# Patient Record
Sex: FEMALE | Race: WHITE | NOT HISPANIC OR LATINO | Employment: OTHER | URBAN - METROPOLITAN AREA
[De-identification: names, ages, dates, MRNs, and addresses within clinical notes are randomized per-mention and may not be internally consistent; named-entity substitution may affect disease eponyms.]

---

## 2017-06-12 ENCOUNTER — HOSPITAL ENCOUNTER (EMERGENCY)
Facility: HOSPITAL | Age: 47
Discharge: HOME | End: 2017-06-12
Attending: EMERGENCY MEDICINE
Payer: COMMERCIAL

## 2017-06-12 VITALS
HEIGHT: 64 IN | TEMPERATURE: 99 F | RESPIRATION RATE: 19 BRPM | HEART RATE: 70 BPM | WEIGHT: 153.63 LBS | DIASTOLIC BLOOD PRESSURE: 69 MMHG | SYSTOLIC BLOOD PRESSURE: 127 MMHG | BODY MASS INDEX: 26.23 KG/M2 | OXYGEN SATURATION: 98 %

## 2017-06-12 DIAGNOSIS — M25.569 KNEE PAIN: ICD-10-CM

## 2017-06-12 DIAGNOSIS — S80.02XA CONTUSION OF KNEE, LEFT: Primary | ICD-10-CM

## 2017-06-12 DIAGNOSIS — S90.02XA CONTUSION OF ANKLE, LEFT: ICD-10-CM

## 2017-06-12 DIAGNOSIS — M25.579 ANKLE PAIN: ICD-10-CM

## 2017-06-12 PROCEDURE — 99283 EMERGENCY DEPT VISIT LOW MDM: CPT

## 2017-06-12 RX ORDER — TRAMADOL HYDROCHLORIDE 50 MG/1
50 TABLET ORAL EVERY 6 HOURS PRN
Qty: 12 TABLET | Refills: 0 | Status: SHIPPED | OUTPATIENT
Start: 2017-06-12 | End: 2017-08-08
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
## 2017-08-08 ENCOUNTER — OFFICE VISIT (OUTPATIENT)
Dept: OBSTETRICS AND GYNECOLOGY | Facility: CLINIC | Age: 47
End: 2017-08-08
Payer: MEDICAID

## 2017-08-08 ENCOUNTER — LAB VISIT (OUTPATIENT)
Dept: LAB | Facility: HOSPITAL | Age: 47
End: 2017-08-08
Attending: ADVANCED PRACTICE MIDWIFE
Payer: MEDICAID

## 2017-08-08 VITALS
WEIGHT: 166.31 LBS | DIASTOLIC BLOOD PRESSURE: 70 MMHG | SYSTOLIC BLOOD PRESSURE: 120 MMHG | HEIGHT: 64 IN | BODY MASS INDEX: 28.39 KG/M2

## 2017-08-08 DIAGNOSIS — Z11.3 SCREENING FOR STDS (SEXUALLY TRANSMITTED DISEASES): ICD-10-CM

## 2017-08-08 DIAGNOSIS — Z12.39 BREAST CANCER SCREENING: ICD-10-CM

## 2017-08-08 DIAGNOSIS — Z01.419 WELL WOMAN EXAM WITH ROUTINE GYNECOLOGICAL EXAM: Primary | ICD-10-CM

## 2017-08-08 PROCEDURE — 86703 HIV-1/HIV-2 1 RESULT ANTBDY: CPT

## 2017-08-08 PROCEDURE — 86592 SYPHILIS TEST NON-TREP QUAL: CPT

## 2017-08-08 PROCEDURE — 80074 ACUTE HEPATITIS PANEL: CPT

## 2017-08-08 PROCEDURE — 99386 PREV VISIT NEW AGE 40-64: CPT | Mod: S$PBB,,, | Performed by: ADVANCED PRACTICE MIDWIFE

## 2017-08-08 PROCEDURE — 36415 COLL VENOUS BLD VENIPUNCTURE: CPT | Mod: PO

## 2017-08-08 PROCEDURE — 99999 PR PBB SHADOW E&M-EST. PATIENT-LVL III: CPT | Mod: PBBFAC,,, | Performed by: ADVANCED PRACTICE MIDWIFE

## 2017-08-08 RX ORDER — IBUPROFEN 200 MG
1 TABLET ORAL
COMMUNITY
End: 2018-02-07

## 2017-08-08 RX ORDER — DULOXETIN HYDROCHLORIDE 30 MG/1
30 CAPSULE, DELAYED RELEASE ORAL 2 TIMES DAILY
COMMUNITY
Start: 2016-11-20 | End: 2018-02-02

## 2017-08-08 RX ORDER — HYDROXYZINE HYDROCHLORIDE 25 MG/1
TABLET, FILM COATED ORAL
Status: ON HOLD | COMMUNITY
Start: 2017-03-01 | End: 2018-04-08 | Stop reason: HOSPADM

## 2017-08-08 RX ORDER — QUETIAPINE FUMARATE 100 MG/1
TABLET, FILM COATED ORAL
COMMUNITY
Start: 2017-07-17 | End: 2018-02-02 | Stop reason: ALTCHOICE

## 2017-08-08 RX ORDER — CARVEDILOL 12.5 MG/1
12.5 TABLET ORAL 2 TIMES DAILY
COMMUNITY

## 2017-08-08 RX ORDER — OMEPRAZOLE 40 MG/1
CAPSULE, DELAYED RELEASE ORAL
Status: ON HOLD | COMMUNITY
Start: 2017-03-01 | End: 2018-09-18

## 2017-08-08 RX ORDER — ALBUTEROL SULFATE 90 UG/1
2 AEROSOL, METERED RESPIRATORY (INHALATION)
COMMUNITY
Start: 2016-09-23 | End: 2017-09-23

## 2017-08-08 RX ORDER — ATORVASTATIN CALCIUM 40 MG/1
TABLET, FILM COATED ORAL
Refills: 10 | COMMUNITY
Start: 2017-07-15

## 2017-08-08 NOTE — PROGRESS NOTES
"CC: Well woman exam    Valorie Monzon is a 47 y.o. female  presents for a well woman exam.  LMP: No LMP recorded. Patient has had a hysterectomy..  No issues, problems, or complaints. Desires STD screening    Past Medical History:   Diagnosis Date    COPD (chronic obstructive pulmonary disease)     Hypertension     Kidney stone     Stroke     Tobacco use      Past Surgical History:   Procedure Laterality Date    HYSTERECTOMY      KNEE ARTHROSCOPY W/ ACL RECONSTRUCTION Left     LITHOTRIPSY       Social History     Social History    Marital status: Legally      Spouse name: N/A    Number of children: N/A    Years of education: N/A     Social History Main Topics    Smoking status: Current Every Day Smoker     Packs/day: 1.00     Types: Cigarettes    Smokeless tobacco: None    Alcohol use No    Drug use: No    Sexual activity: Yes     Partners: Male     Other Topics Concern    None     Social History Narrative    None     Family History   Problem Relation Age of Onset    Kidney disease Mother     Cancer Mother     Liver disease Mother      OB History      Para Term  AB Living    4 4 0 0 0 4    SAB TAB Ectopic Multiple Live Births    0 0 0 0 4          /70   Ht 5' 4" (1.626 m)   Wt 75.4 kg (166 lb 5.4 oz)   BMI 28.55 kg/m²       ROS:  GENERAL: Denies weight gain or weight loss. Feeling well overall.   SKIN: Denies rash or lesions.   HEAD: Denies head injury or headache.   NODES: Denies enlarged lymph nodes.   CHEST: Denies chest pain or shortness of breath.   CARDIOVASCULAR: Denies palpitations or left sided chest pain.   ABDOMEN: No abdominal pain, constipation, diarrhea, nausea, vomiting or rectal bleeding.   URINARY: No frequency, dysuria, hematuria, or burning on urination.  REPRODUCTIVE: See HPI.   BREASTS: The patient performs breast self-examination and denies pain, lumps, or nipple discharge.   HEMATOLOGIC: No easy bruisability or excessive " bleeding.   MUSCULOSKELETAL: Denies joint pain or swelling.   NEUROLOGIC: Denies syncope or weakness.   PSYCHIATRIC: Denies depression, anxiety or mood swings.    PHYSICAL EXAM:    APPEARANCE: Well nourished, well developed, in no acute distress.  AFFECT: WNL, alert and oriented x 3  SKIN: No acne or hirsutism  NECK: Neck symmetric without masses or thyromegaly  NODES: No inguinal, cervical, axillary, or femoral lymph node enlargement  CHEST: Good respiratory effect  ABDOMEN: Soft.  No tenderness or masses.  No hepatosplenomegaly.  No hernias.  BREASTS: Symmetrical, no skin changes or visible lesions.  No palpable masses, nipple discharge bilaterally.  PELVIC: Normal external genitalia without lesions.  Normal hair distribution.  Adequate perineal body, normal urethral meatus.  Vagina moist and well rugated without lesions or discharge.  Cervix pink, without lesions, discharge or tenderness.  No significant cystocele or rectocele.  Bimanual exam shows uterus absent. Adnexa without masses or tenderness.        1. Well woman exam with routine gynecological exam     2. Breast cancer screening  Mammo Digital Screening Bilat With CAD   3. Screening for STDs (sexually transmitted diseases)  C. trachomatis/N. gonorrhoeae by AMP DNA Vagina    HIV-1 and HIV-2 antibodies    RPR    Hepatitis panel, acute    Hepatitis B surface antigen   PLAN:  STD screening  Mammogram scheduled  Patient was counseled today on A.C.S. Pap guidelines and recommendations for yearly pelvic exams, mammograms and monthly self breast exams; to see her PCP for other health maintenance.

## 2017-08-09 LAB
C TRACH DNA SPEC QL NAA+PROBE: NOT DETECTED
HAV IGM SERPL QL IA: NEGATIVE
HBV CORE IGM SERPL QL IA: NEGATIVE
HBV SURFACE AG SERPL QL IA: NEGATIVE
HBV SURFACE AG SERPL QL IA: NEGATIVE
HCV AB SERPL QL IA: NEGATIVE
HIV 1+2 AB+HIV1 P24 AG SERPL QL IA: NEGATIVE
N GONORRHOEA DNA SPEC QL NAA+PROBE: NOT DETECTED
RPR SER QL: NORMAL

## 2017-08-13 ENCOUNTER — HOSPITAL ENCOUNTER (EMERGENCY)
Facility: HOSPITAL | Age: 47
Discharge: HOME | End: 2017-08-13
Payer: MEDICAID

## 2017-08-13 VITALS
OXYGEN SATURATION: 98 % | HEART RATE: 92 BPM | HEIGHT: 64 IN | SYSTOLIC BLOOD PRESSURE: 132 MMHG | BODY MASS INDEX: 27.31 KG/M2 | RESPIRATION RATE: 18 BRPM | WEIGHT: 160 LBS | DIASTOLIC BLOOD PRESSURE: 87 MMHG | TEMPERATURE: 98 F

## 2017-08-13 DIAGNOSIS — M54.12 CERVICAL RADICULOPATHY: Primary | ICD-10-CM

## 2017-08-13 PROCEDURE — 99283 EMERGENCY DEPT VISIT LOW MDM: CPT

## 2017-08-13 RX ORDER — CYCLOBENZAPRINE HCL 10 MG
10 TABLET ORAL 3 TIMES DAILY PRN
Qty: 15 TABLET | Refills: 0 | Status: SHIPPED | OUTPATIENT
Start: 2017-08-13 | End: 2017-08-18

## 2017-08-13 RX ORDER — DICLOFENAC SODIUM 75 MG/1
75 TABLET, DELAYED RELEASE ORAL 2 TIMES DAILY
Qty: 20 TABLET | Refills: 0 | Status: SHIPPED | OUTPATIENT
Start: 2017-08-13 | End: 2018-02-02 | Stop reason: ALTCHOICE

## 2017-08-13 RX ORDER — PREDNISONE 20 MG/1
40 TABLET ORAL DAILY
Qty: 10 TABLET | Refills: 0 | Status: SHIPPED | OUTPATIENT
Start: 2017-08-13 | End: 2017-08-18

## 2017-08-13 NOTE — ED PROVIDER NOTES
SCRIBE #1 NOTE: I, Joaquin Herndon, am scribing for, and in the presence of, YANET Betancourt. I have scribed the entire note.      History      Chief Complaint   Patient presents with    Shoulder Pain     R shoulder pain x 4 days ago, not relieved by ibuprofen, pt states she was moving a lot of boxes       Review of patient's allergies indicates:  No Known Allergies     HPI   HPI    8/13/2017, 3:21 PM   History obtained from the patient      History of Present Illness: Valorie Monzon is a 47 y.o. female patient who presents to the Emergency Department for R shoulder pain which onset gradually 4 days ago after moving a lot of boxes. Symptoms are constant and moderate in severity. Pt states that pain radiates down her R arm into her fingers. No mitigating or exacerbating factors reported. Associated sxs include R sided neck pain and numbness and paresthesia in the digits of the R hand. Patient denies any weakness, other injury, back pain, neck stiffness, fever, chills, n/v/d, CP, SOB, and all other sxs at this time. Prior Tx includes ibuprofen without relief. No further complaints or concerns at this time.       Arrival mode: Personal vehicle    PCP: Karri Levy MD       Past Medical History:  Past Medical History:   Diagnosis Date    COPD (chronic obstructive pulmonary disease)     Hypertension     Kidney stone     Stroke     Tobacco use        Past Surgical History:  Past Surgical History:   Procedure Laterality Date    HYSTERECTOMY      KNEE ARTHROSCOPY W/ ACL RECONSTRUCTION Left     LITHOTRIPSY           Family History:  Family History   Problem Relation Age of Onset    Kidney disease Mother     Cancer Mother     Liver disease Mother        Social History:  Social History     Social History Main Topics    Smoking status: Current Every Day Smoker     Packs/day: 1.00     Types: Cigarettes    Smokeless tobacco: Not given    Alcohol use No    Drug use: No    Sexual activity: Yes      Partners: Male       ROS   Review of Systems   Constitutional: Negative for chills and fever.        (-) other injury   HENT: Negative for sore throat.    Respiratory: Negative for shortness of breath.    Cardiovascular: Negative for chest pain.   Gastrointestinal: Negative for diarrhea, nausea and vomiting.   Genitourinary: Negative for dysuria.   Musculoskeletal: Positive for myalgias (R shoulder) and neck pain (R sided). Negative for back pain and neck stiffness.   Skin: Negative for rash.   Neurological: Positive for numbness (in digits of R hand). Negative for weakness.        (+) paresthesia in digits of R hand   Hematological: Does not bruise/bleed easily.   All other systems reviewed and are negative.      Physical Exam      Initial Vitals [08/13/17 1456]   BP Pulse Resp Temp SpO2   132/87 92 18 98 °F (36.7 °C) 98 %      MAP       102          Physical Exam  Nursing Notes and Vital Signs Reviewed.  Constitutional: Patient is in no acute distress. Well-developed and well-nourished.  Head: Atraumatic. Normocephalic.  Eyes: PERRL. EOM intact. Conjunctivae are not pale. No scleral icterus.  ENT: Mucous membranes are moist.    Neck: Supple. No lymphadenopathy. Cervical paraspinal tenderness.  Cardiovascular: Regular rate. Regular rhythm. No murmurs, rubs, or gallops.  Pulmonary/Chest: No respiratory distress.  Abdominal: Soft and non-distended.   Musculoskeletal: Moves all extremities. No obvious deformities. No edema.   RUE: has no evident deformity. Negative for swelling.. Cap refill distally is <2 seconds. Radial pulses are equal and 2+ bilaterally. No motor deficit. No distal sensory deficit.   Skin: Warm and dry.  Neurological:  Alert, awake, and appropriate.  Normal speech.  No acute focal neurological deficits are appreciated. Good  strength.  Psychiatric: Normal affect. Good eye contact. Appropriate in content.    ED Course    Procedures  ED Vital Signs:  Vitals:    08/13/17 1456   BP: 132/87  "  Pulse: 92   Resp: 18   Temp: 98 °F (36.7 °C)   TempSrc: Oral   SpO2: 98%   Weight: 72.6 kg (160 lb)   Height: 5' 4" (1.626 m)            The Emergency Provider reviewed the vital signs and test results, which are outlined above.    ED Discussion     3:34 PM: Initial assessment of pt at this time.  Discussed with pt all pertinent ED information. Discussed pt dx and plan of tx. Gave pt all f/u and return to the ED instructions. All questions and concerns were addressed at this time. Pt expresses understanding of information and instructions, and is comfortable with plan to discharge. Pt is stable for discharge.    I discussed with patient and/or family/caretaker that evaluation in the ED does not suggest any emergent or life threatening medical conditions requiring immediate intervention beyond what was provided in the ED, and I believe patient is safe for discharge.  Regardless, an unremarkable evaluation in the ED does not preclude the development or presence of a serious of life threatening condition. As such, patient was instructed to return immediately for any worsening or change in current symptoms.      ED Medication(s):  Medications - No data to display    New Prescriptions    CYCLOBENZAPRINE (FLEXERIL) 10 MG TABLET    Take 1 tablet (10 mg total) by mouth 3 (three) times daily as needed for Muscle spasms.    DICLOFENAC (VOLTAREN) 75 MG EC TABLET    Take 1 tablet (75 mg total) by mouth 2 (two) times daily.    PREDNISONE (DELTASONE) 20 MG TABLET    Take 2 tablets (40 mg total) by mouth once daily.       Follow-up Information     Karri Levy MD. Go in 2 days.    Specialty:  Internal Medicine  Contact information:  [address] [address] [id number] [phone]                     Medical Decision Making        Additional MDM:   Smoking Cessation: The patient was counseled on tobacco related  health complications.        Scribe Attestation:   Scribe #1: I performed the " above scribed service and the documentation accurately describes the services I performed. I attest to the accuracy of the note.    Attending:   Physician Attestation Statement for Scribe #1: I, YANET Betancourt, personally performed the services described in this documentation, as scribed by Joaquin Herndon, in my presence, and it is both accurate and complete.          Clinical Impression       ICD-10-CM ICD-9-CM   1. Cervical radiculopathy M54.12 723.4       Disposition:   Disposition: Discharged  Condition: Stable         YANET Betancourt  08/15/17 7625

## 2017-09-01 ENCOUNTER — HOSPITAL ENCOUNTER (OUTPATIENT)
Dept: RADIOLOGY | Facility: HOSPITAL | Age: 47
Discharge: HOME | End: 2017-09-01
Attending: ADVANCED PRACTICE MIDWIFE
Payer: MEDICAID

## 2017-09-01 VITALS — WEIGHT: 160 LBS | BODY MASS INDEX: 27.31 KG/M2 | HEIGHT: 64 IN

## 2017-09-01 DIAGNOSIS — Z12.39 BREAST CANCER SCREENING: ICD-10-CM

## 2017-09-01 PROCEDURE — 77067 SCR MAMMO BI INCL CAD: CPT | Mod: TC

## 2017-09-01 PROCEDURE — 77067 SCR MAMMO BI INCL CAD: CPT | Mod: 26,,, | Performed by: RADIOLOGY

## 2017-09-06 ENCOUNTER — HOSPITAL ENCOUNTER (OUTPATIENT)
Dept: RADIOLOGY | Facility: HOSPITAL | Age: 47
Discharge: HOME | End: 2017-09-06
Attending: ADVANCED PRACTICE MIDWIFE
Payer: MEDICAID

## 2017-09-06 ENCOUNTER — HOSPITAL ENCOUNTER (EMERGENCY)
Facility: HOSPITAL | Age: 47
Discharge: HOME | End: 2017-09-06
Attending: EMERGENCY MEDICINE
Payer: MEDICAID

## 2017-09-06 VITALS
BODY MASS INDEX: 28.17 KG/M2 | WEIGHT: 165 LBS | SYSTOLIC BLOOD PRESSURE: 113 MMHG | HEART RATE: 85 BPM | DIASTOLIC BLOOD PRESSURE: 77 MMHG | TEMPERATURE: 98 F | RESPIRATION RATE: 18 BRPM | HEIGHT: 64 IN

## 2017-09-06 DIAGNOSIS — S40.011A CONTUSION OF SHOULDER, RIGHT: Primary | ICD-10-CM

## 2017-09-06 DIAGNOSIS — M25.519 SHOULDER PAIN: ICD-10-CM

## 2017-09-06 DIAGNOSIS — R92.8 ABNORMAL MAMMOGRAM: ICD-10-CM

## 2017-09-06 DIAGNOSIS — S43.491A SPRAIN OF OTHER PART OF RIGHT SHOULDER REGION, INITIAL ENCOUNTER: ICD-10-CM

## 2017-09-06 PROCEDURE — 77065 DX MAMMO INCL CAD UNI: CPT | Mod: 26,LT,, | Performed by: RADIOLOGY

## 2017-09-06 PROCEDURE — 77065 DX MAMMO INCL CAD UNI: CPT | Mod: TC,LT

## 2017-09-06 PROCEDURE — 99283 EMERGENCY DEPT VISIT LOW MDM: CPT

## 2017-09-06 RX ORDER — ACETAMINOPHEN AND CODEINE PHOSPHATE 300; 30 MG/1; MG/1
1-2 TABLET ORAL EVERY 6 HOURS PRN
Qty: 14 TABLET | Refills: 0 | Status: SHIPPED | OUTPATIENT
Start: 2017-09-06 | End: 2018-02-02 | Stop reason: ALTCHOICE

## 2017-09-06 NOTE — ED PROVIDER NOTES
Encounter Date: 9/6/2017       History     Chief Complaint   Patient presents with    Shoulder Pain     shoulder pain and lower back pain     47 year old female with complaint of right shoulder pain and lower back pain after fall 3 days ago. Reports slipped while standing on chair and landed on right shoulder.  Moderate pain.  Worse with movement.  Constant aching pain.  No radiation of pain.            Review of patient's allergies indicates:  No Known Allergies  Past Medical History:   Diagnosis Date    COPD (chronic obstructive pulmonary disease)     Hypertension     Kidney stone     Stroke     Tobacco use      Past Surgical History:   Procedure Laterality Date    HYSTERECTOMY      KNEE ARTHROSCOPY W/ ACL RECONSTRUCTION Left     LITHOTRIPSY       Family History   Problem Relation Age of Onset    Kidney disease Mother     Cancer Mother     Liver disease Mother     Breast cancer Mother      Social History   Substance Use Topics    Smoking status: Current Every Day Smoker     Packs/day: 1.00     Types: Cigarettes    Smokeless tobacco: Not on file    Alcohol use No     Review of Systems   Constitutional: Negative for fever.   HENT: Negative for sore throat.    Respiratory: Negative for shortness of breath.    Cardiovascular: Negative for chest pain.   Gastrointestinal: Negative for nausea.   Genitourinary: Negative for dysuria.   Musculoskeletal: Positive for back pain.        Right shoulder pain    Skin: Negative for rash.   Neurological: Negative for weakness.   Hematological: Does not bruise/bleed easily.       Physical Exam     Initial Vitals [09/06/17 1124]   BP Pulse Resp Temp SpO2   113/77 85 18 97.8 °F (36.6 °C) --      MAP       89         Physical Exam    Nursing note and vitals reviewed.  Constitutional: She appears well-developed and well-nourished.   HENT:   Head: Normocephalic and atraumatic.   Eyes: Conjunctivae and EOM are normal. Pupils are equal, round, and reactive to light.   Neck:  Normal range of motion. Neck supple.   Cardiovascular: Normal rate, regular rhythm, normal heart sounds and intact distal pulses.   Pulmonary/Chest: Breath sounds normal.   Abdominal: Soft. There is no tenderness. There is no rebound and no guarding.   Musculoskeletal: Normal range of motion.   Right lateral shoulder tenderness, full ROM right shoulder without difficulty, no spine tenderness, ambulates without difficulty   Neurological: She is alert and oriented to person, place, and time. She has normal strength and normal reflexes.   Skin: Skin is warm and dry.   Psychiatric: She has a normal mood and affect. Her behavior is normal. Thought content normal.         ED Course   Procedures  Labs Reviewed - No data to display                            ED Course      Clinical Impression:   The primary encounter diagnosis was Contusion of shoulder, right. Diagnoses of Shoulder pain and Sprain of other part of right shoulder region, initial encounter were also pertinent to this visit.                           Tylor Meeks NP  09/06/17 2821

## 2017-09-07 DIAGNOSIS — R92.8 FOLLOW-UP EXAMINATION OF ABNORMAL MAMMOGRAM: Primary | ICD-10-CM

## 2018-01-30 ENCOUNTER — TELEPHONE (OUTPATIENT)
Dept: OBSTETRICS AND GYNECOLOGY | Facility: CLINIC | Age: 48
End: 2018-01-30

## 2018-01-30 NOTE — TELEPHONE ENCOUNTER
----- Message from Bella Alanis sent at 1/30/2018 12:31 PM CST -----  Contact: patient  Patient called and stated she has been having pain in her Pelvic area since July and she states it's getting very painful. She would like someone to call her at [phone].    Thanks,  Bella

## 2018-01-30 NOTE — TELEPHONE ENCOUNTER
Spoke with pt. Pt c/o of pelvic pain and states 3/1/18 is too long to wait. Scheduled pt for 2/2/18 at 9:30AM with Rosa Maria Cedeno N.P. At the [place] location. Pt verbalized understanding.

## 2018-02-02 ENCOUNTER — OFFICE VISIT (OUTPATIENT)
Dept: OBSTETRICS AND GYNECOLOGY | Facility: CLINIC | Age: 48
End: 2018-02-02
Payer: MEDICAID

## 2018-02-02 VITALS
SYSTOLIC BLOOD PRESSURE: 120 MMHG | HEIGHT: 64 IN | DIASTOLIC BLOOD PRESSURE: 72 MMHG | WEIGHT: 231.5 LBS | BODY MASS INDEX: 39.52 KG/M2

## 2018-02-02 DIAGNOSIS — R10.2 PELVIC PAIN IN FEMALE: Primary | ICD-10-CM

## 2018-02-02 PROCEDURE — 3008F BODY MASS INDEX DOCD: CPT | Mod: ,,, | Performed by: NURSE PRACTITIONER

## 2018-02-02 PROCEDURE — 99213 OFFICE O/P EST LOW 20 MIN: CPT | Mod: S$PBB,,, | Performed by: NURSE PRACTITIONER

## 2018-02-02 PROCEDURE — 99213 OFFICE O/P EST LOW 20 MIN: CPT | Mod: PBBFAC | Performed by: NURSE PRACTITIONER

## 2018-02-02 PROCEDURE — 99999 PR PBB SHADOW E&M-EST. PATIENT-LVL III: CPT | Mod: PBBFAC,,, | Performed by: NURSE PRACTITIONER

## 2018-02-02 RX ORDER — LORAZEPAM 1 MG/1
0.5 TABLET ORAL
Status: ON HOLD | COMMUNITY
Start: 2018-01-30 | End: 2018-04-08 | Stop reason: HOSPADM

## 2018-02-02 RX ORDER — NICOTINE 21-14-7MG
KIT TRANSDERMAL
COMMUNITY
Start: 2017-12-28

## 2018-02-02 RX ORDER — GABAPENTIN 600 MG/1
600 TABLET ORAL 2 TIMES DAILY
COMMUNITY
Start: 2018-01-30

## 2018-02-02 RX ORDER — ALBUTEROL SULFATE 90 UG/1
1 AEROSOL, METERED RESPIRATORY (INHALATION) 2 TIMES DAILY PRN
COMMUNITY
Start: 2017-11-08 | End: 2018-05-08

## 2018-02-02 RX ORDER — BACLOFEN 10 MG/1
10 TABLET ORAL
Status: ON HOLD | COMMUNITY
Start: 2018-01-30 | End: 2018-04-08 | Stop reason: HOSPADM

## 2018-02-02 RX ORDER — DULOXETIN HYDROCHLORIDE 60 MG/1
60 CAPSULE, DELAYED RELEASE ORAL DAILY
COMMUNITY
Start: 2018-01-16
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
## 2018-02-07 ENCOUNTER — HOSPITAL ENCOUNTER (EMERGENCY)
Facility: HOSPITAL | Age: 48
Discharge: HOME | End: 2018-02-07
Attending: EMERGENCY MEDICINE
Payer: MEDICAID

## 2018-02-07 ENCOUNTER — TELEPHONE (OUTPATIENT)
Dept: RADIOLOGY | Facility: HOSPITAL | Age: 48
End: 2018-02-07

## 2018-02-07 VITALS
HEIGHT: 64 IN | HEART RATE: 82 BPM | RESPIRATION RATE: 25 BRPM | WEIGHT: 234.56 LBS | DIASTOLIC BLOOD PRESSURE: 66 MMHG | BODY MASS INDEX: 40.04 KG/M2 | TEMPERATURE: 98 F | SYSTOLIC BLOOD PRESSURE: 122 MMHG | OXYGEN SATURATION: 100 %

## 2018-02-07 DIAGNOSIS — R60.0 BILATERAL LEG EDEMA: Primary | ICD-10-CM

## 2018-02-07 PROCEDURE — 99283 EMERGENCY DEPT VISIT LOW MDM: CPT

## 2018-02-07 RX ORDER — FUROSEMIDE 20 MG/1
20 TABLET ORAL DAILY
Qty: 30 TABLET | Refills: 0 | Status: SHIPPED | OUTPATIENT
Start: 2018-02-07 | End: 2019-06-13

## 2018-02-07 NOTE — ED PROVIDER NOTES
"SCRIBE #1 NOTE: I, Ze Riley Marcin, am scribing for, and in the presence of, Xi Galss MD. I have scribed the entire note.      History      Chief Complaint   Patient presents with    Leg Swelling     " bilaterl leg swelling and tingling"       Review of patient's allergies indicates:  No Known Allergies     HPI   HPI    2/7/2018, 11:28 AM   History obtained from the patient      History of Present Illness: Valorie Monzon is a 47 y.o. female patient with a PMHx of DM, who presents to the Emergency Department for BLE swelling which onset gradually one week ago, denies any trauma or injury, numbness or weakness, no pain with ambulation. Sxs are constant and moderate in severity. There are no mitigating or exacerbating factors noted.  Pt denies any fever, N/V/D, calf pain, SOB, cough, CP, numbness, dizziness, and all other sxs at this time. Pt states she is no longer a smoker. No further complaints or concerns at this time.         Arrival mode: Personal vehicle     PCP: Karri Levy MD       Past Medical History:  Past Medical History:   Diagnosis Date    COPD (chronic obstructive pulmonary disease)     Hypertension     Kidney stone     Stroke     Tobacco use        Past Surgical History:  Past Surgical History:   Procedure Laterality Date    HYSTERECTOMY      KNEE ARTHROSCOPY W/ ACL RECONSTRUCTION Left     LITHOTRIPSY           Family History:  Family History   Problem Relation Age of Onset    Kidney disease Mother     Cancer Mother     Liver disease Mother     Breast cancer Mother        Social History:  Social History     Social History Main Topics    Smoking status: Former Smoker     Packs/day: 1.00     Types: Cigarettes    Smokeless tobacco: Never Used      Comment: patches     Alcohol use No    Drug use: No    Sexual activity: Not Currently     Partners: Male       ROS   Review of Systems   Constitutional: Negative for fever.   HENT: Negative for sore throat.  "   Respiratory: Negative for shortness of breath.    Cardiovascular: Positive for leg swelling. Negative for chest pain.   Gastrointestinal: Negative for abdominal pain, constipation, diarrhea, nausea and vomiting.   Genitourinary: Negative for dysuria and hematuria.   Musculoskeletal: Negative for back pain.   Skin: Negative for rash.   Neurological: Negative for weakness.   Hematological: Does not bruise/bleed easily.       Physical Exam      Initial Vitals [02/07/18 1043]   BP Pulse Resp Temp SpO2   129/85 82 20 97.9 °F (36.6 °C) 98 %      MAP       99.67          Physical Exam  Nursing Notes and Vital Signs Reviewed.  Constitutional: Patient is in no acute distress. Well-developed and well-nourished.  Head: Atraumatic. Normocephalic.  Eyes: PERRL. EOM intact. Conjunctivae are not pale. No scleral icterus.  ENT: Mucous membranes are moist. Oropharynx is clear and symmetric.    Neck: Supple. Full ROM. No lymphadenopathy.  Cardiovascular: Regular rate. Regular rhythm. No murmurs, rubs, or gallops. Distal pulses are 2+ and symmetric.  Pulmonary/Chest: No respiratory distress. Clear to auscultation bilaterally. No wheezing or rales.  Abdominal: Soft and non-distended.  There is no tenderness.  No rebound, guarding, or rigidity. Good bowel sounds.  Genitourinary: No CVA tenderness  Musculoskeletal: Moves all extremities. No obvious deformities. 1+ BLE edema. No calf tenderness. There is no erythema or warmth, no calf pain or swelling. NVI distally.   Skin: Warm and dry.  No rash.   Neurological:  Alert, awake, and appropriate.  Normal speech.  No acute focal neurological deficits are appreciated.  Psychiatric: Normal affect. Good eye contact. Appropriate in content.    ED Course    Procedures  ED Vital Signs:  Vitals:    02/07/18 1043 02/07/18 1101 02/07/18 1119   BP: 129/85 122/66    Pulse: 82 85 82   Resp: 20 (!) 25    Temp: 97.9 °F (36.6 °C)     TempSrc: Oral     SpO2: 98% 100%    Weight: 106.4 kg (234 lb 9.1 oz)    "  Height: 5' 4" (1.626 m)                The Emergency Provider reviewed the vital signs and test results, which are outlined above.    ED Discussion     11:29 AM: Discussed plan of treatment with pt, will start lasix, patient drinking a dr pepper discussed hidden sodium in processed foods, leg elevation, compression stockings, I do not think this is a DVT. Gave pt all f/u and return to the ED instructions. All questions and concerns were addressed at this time. Pt understands and agrees to plan as discussed. Pt is stable for discharge.     ED Medication(s):  Medications - No data to display    Discharge Medication List as of 2/7/2018 11:28 AM      START taking these medications    Details   furosemide (LASIX) 20 MG tablet Take 1 tablet (20 mg total) by mouth once daily., Starting Wed 2/7/2018, Until Thu 2/7/2019, Print             Follow-up Information     Karri Levy MD. Schedule an appointment as soon as possible for a visit in 1 day.    Specialty:  Internal Medicine  Why:  Return to the Emergency Room, If symptoms worsen  Contact information:  [address] [address] [address]  [phone]                     Medical Decision Making              Scribe Attestation:   Scribe #1: I performed the above scribed service and the documentation accurately describes the services I performed. I attest to the accuracy of the note.    Attending:   Physician Attestation Statement for Scribe #1: I, Xi Glass MD, personally performed the services described in this documentation, as scribed by Ze Burch, in my presence, and it is both accurate and complete.          Clinical Impression       ICD-10-CM ICD-9-CM   1. Bilateral leg edema R60.0 782.3       Disposition:   Disposition: Discharged  Condition: Stable         Xi Glass MD  02/07/18 1558    "

## 2018-02-08 ENCOUNTER — APPOINTMENT (OUTPATIENT)
Dept: RADIOLOGY | Facility: HOSPITAL | Age: 48
End: 2018-02-08
Attending: NURSE PRACTITIONER
Payer: MEDICAID

## 2018-02-08 ENCOUNTER — TELEPHONE (OUTPATIENT)
Dept: OBSTETRICS AND GYNECOLOGY | Facility: CLINIC | Age: 48
End: 2018-02-08

## 2018-02-08 DIAGNOSIS — R10.2 PELVIC PAIN IN FEMALE: ICD-10-CM

## 2018-02-08 DIAGNOSIS — N83.202 LEFT OVARIAN CYST: Primary | ICD-10-CM

## 2018-02-08 DIAGNOSIS — R93.429 ABNORMAL ULTRASOUND OF KIDNEY: ICD-10-CM

## 2018-02-08 PROCEDURE — 76856 US EXAM PELVIC COMPLETE: CPT | Mod: 26,,, | Performed by: RADIOLOGY

## 2018-02-08 PROCEDURE — 76830 TRANSVAGINAL US NON-OB: CPT | Mod: TC,PO

## 2018-02-08 PROCEDURE — 76830 TRANSVAGINAL US NON-OB: CPT | Mod: 26,,, | Performed by: RADIOLOGY

## 2018-02-08 NOTE — TELEPHONE ENCOUNTER
Spoke with pt and informed of pelvic u/s results. Scheduled pt at the [facility] location in 6 wks for a repeat u/s. Pt voiced understanding.

## 2018-02-08 NOTE — TELEPHONE ENCOUNTER
----- Message from Rosa Maria Cedeno NP sent at 2/8/2018  3:45 PM CST -----  Please call patient and inform  Her of pelvic ultrasound Small complex left ovarian cyst( nothing to worry about) Repeat imaging in 6-12 weeks to document resolution. Please set up repeat ultrasound.

## 2018-02-08 NOTE — PROGRESS NOTES
Please call patient and inform  Her of pelvic ultrasound Small complex left ovarian cyst( nothing to worry about) Repeat imaging in 6-12 weeks to document resolution. Please set up repeat ultrasound.

## 2018-02-09 ENCOUNTER — TELEPHONE (OUTPATIENT)
Dept: OBSTETRICS AND GYNECOLOGY | Facility: CLINIC | Age: 48
End: 2018-02-09

## 2018-02-12 ENCOUNTER — TELEPHONE (OUTPATIENT)
Dept: UROLOGY | Facility: CLINIC | Age: 48
End: 2018-02-12

## 2018-02-12 NOTE — TELEPHONE ENCOUNTER
Returned call to pt; attempted to schedule appt for her but MD is booked.  Informed pt that I would put her on a wait list and call if something opened up.

## 2018-02-15 ENCOUNTER — TELEPHONE (OUTPATIENT)
Dept: OBSTETRICS AND GYNECOLOGY | Facility: CLINIC | Age: 48
End: 2018-02-15

## 2018-02-15 NOTE — TELEPHONE ENCOUNTER
----- Message from Rosa Maria Cedeno NP sent at 2/9/2018  9:24 AM CST -----  Please call patient and inform  her that ultrasound indicates a possible left renal mass.Patient needs a referral appt to urology. I did attempt to discuss this with the patient, no answer.

## 2018-02-15 NOTE — TELEPHONE ENCOUNTER
Rachel LINARES from urology has reached out to the pt and placed on wait list per telephone encounter.

## 2018-02-26 ENCOUNTER — TELEPHONE (OUTPATIENT)
Dept: OBSTETRICS AND GYNECOLOGY | Facility: CLINIC | Age: 48
End: 2018-02-26

## 2018-02-26 NOTE — TELEPHONE ENCOUNTER
----- Message from Sri Pierre sent at 2/26/2018  2:59 PM CST -----  Contact: pt  The pt request a call, no additional info given, the pt can be reached at [phone]///thxMW

## 2018-02-26 NOTE — TELEPHONE ENCOUNTER
Pt was informed to contact the clinic today to try and get a sooner appt for Urology, due to a renal mass. Informed the patient that per notes on chart she was told that she would be placed on a wait list, but we can refer her to [facility], and they will give her a call to schedule an appt. Informed pt that I could not give any detailed information on her results, and this is the reason she is being referred. Pt voiced understanding.

## 2018-03-05 ENCOUNTER — HOSPITAL ENCOUNTER (OUTPATIENT)
Dept: RADIOLOGY | Facility: HOSPITAL | Age: 48
Discharge: HOME | End: 2018-03-05
Attending: ADVANCED PRACTICE MIDWIFE
Payer: MEDICAID

## 2018-03-05 DIAGNOSIS — R92.8 FOLLOW-UP EXAMINATION OF ABNORMAL MAMMOGRAM: ICD-10-CM

## 2018-03-05 PROCEDURE — 77065 DX MAMMO INCL CAD UNI: CPT | Mod: TC,LT

## 2018-03-05 PROCEDURE — 77065 DX MAMMO INCL CAD UNI: CPT | Mod: 26,LT,, | Performed by: RADIOLOGY

## 2018-03-06 ENCOUNTER — TELEPHONE (OUTPATIENT)
Dept: HEMATOLOGY/ONCOLOGY | Facility: CLINIC | Age: 48
End: 2018-03-06

## 2018-03-06 DIAGNOSIS — R92.8 ABNORMAL MAMMOGRAM OF LEFT BREAST: Primary | ICD-10-CM

## 2018-03-06 NOTE — TELEPHONE ENCOUNTER
----- Message from Eb Patterson III, LPN sent at 3/6/2018 10:29 AM CST -----  Contact: Jackeline       ----- Message -----  From: Jackeline Knight MA  Sent: 3/6/2018   9:38 AM  To: [facility] Staff    Good Morning,     I was attempting to try and get this patient scheduled with your office, due to her having an abnormal mammo, and possibly needing a biopsy. Can you please assist me with this?

## 2018-03-09 ENCOUNTER — TELEPHONE (OUTPATIENT)
Dept: OBSTETRICS AND GYNECOLOGY | Facility: CLINIC | Age: 48
End: 2018-03-09

## 2018-03-09 ENCOUNTER — TELEPHONE (OUTPATIENT)
Dept: HEMATOLOGY/ONCOLOGY | Facility: CLINIC | Age: 48
End: 2018-03-09

## 2018-03-09 NOTE — TELEPHONE ENCOUNTER
----- Message from Rosa Maria Cedeno NP sent at 3/6/2018  8:57 AM CST -----  Patient needs referral for [facility] general surgery.,:Left breast calcifications at the retroareolar anterior 1 o'clock position. Assessment: 4 - Suspicious finding. Biopsy is recommended.

## 2018-03-12 ENCOUNTER — TELEPHONE (OUTPATIENT)
Dept: OBSTETRICS AND GYNECOLOGY | Facility: CLINIC | Age: 48
End: 2018-03-12

## 2018-03-12 ENCOUNTER — TELEPHONE (OUTPATIENT)
Dept: SURGERY | Facility: CLINIC | Age: 48
End: 2018-03-12

## 2018-03-12 NOTE — TELEPHONE ENCOUNTER
----- Message from Miguelina Barajas sent at 3/12/2018 10:49 AM CDT -----  Pt at [phone]//states she has questions regarding her appt on 3-22-18//please call//thanks/amrita

## 2018-03-12 NOTE — TELEPHONE ENCOUNTER
----- Message from Merissa Pierre sent at 3/12/2018 10:08 AM CDT -----  Patient state she received a notice in the mail stating she need to have a biopsy done. Patient wants to know if this will be performed at her appt with the Hematologist Oncologist. Please adv/call [phone].//thanks. cw

## 2018-03-20 NOTE — PROGRESS NOTES
Patient ID: Valorie Monzon is a 47 y.o. female.    Chief Complaint: Abnormal Mammogram      HPI: Patient presents for the evaluation of an abnormal mammogram of the left breast.  September 1, 2017 patient had a bilateral mammogram that was a screening that revealed calcifications in the left breast 1:00 position.  Six-month follow-up was recommended and performed on March 5, 2018.  Left breast diagnostic mammogram revealed more ill-defined appearance of the calcifications therefore stereotactic biopsy has been recommended.    Patient presents today to discuss biopsy options and recommendations.    Patient is on daily aspirin and the last dose was today.     Personal history of CVA 2 years ago. Followed by her cardiologist Dr. Mcdonald. Spoke to him today and he states is ok for her to be off her aspirin for 5 days prior to the biopsy. Risks of CVA reviewed while off aspirin. Pt verbalized understanding.     Review of Systems   Constitutional: Negative.  Negative for appetite change and unexpected weight change.   HENT: Negative.    Eyes: Negative.  Negative for visual disturbance.   Respiratory: Negative.  Negative for cough and shortness of breath.    Cardiovascular: Negative.  Negative for chest pain.   Gastrointestinal: Negative.  Negative for abdominal pain and diarrhea.        No reflux   Endocrine: Negative.    Genitourinary: Negative.  Negative for frequency.   Musculoskeletal: Negative.  Negative for back pain.   Skin: Negative.  Negative for rash.   Allergic/Immunologic: Negative.    Neurological: Negative.  Negative for headaches.   Hematological: Negative.  Negative for adenopathy.   Psychiatric/Behavioral: Negative.  The patient is not nervous/anxious.    Breast: Pt denies any breast pain, nipple discharge, or palpable mass. No prior trauma or bruising. No breast surgeries or abnormalities.    Current Outpatient Prescriptions   Medication Sig Dispense Refill    aspirin (ECOTRIN) 325 MG EC tablet  Take 325 mg by mouth once daily.      atorvastatin (LIPITOR) 40 MG tablet TAKE 1 TABLET BY MOUTH DAILY AVOID GRAPEFRUIT  10    baclofen (LIORESAL) 10 MG tablet Take 10 mg by mouth as needed.      carvedilol (COREG) 3.125 MG tablet Take 3.125 mg by mouth.      DULoxetine (CYMBALTA) 60 MG capsule Take 60 mg by mouth once daily.      furosemide (LASIX) 20 MG tablet Take 1 tablet (20 mg total) by mouth once daily. 30 tablet 0    gabapentin (NEURONTIN) 600 MG tablet Take 600 mg by mouth 2 (two) times daily.      hydrOXYzine HCl (ATARAX) 25 MG tablet TAKE 1 TABLET BY MOUTH THREE TIMES DAILY AS NEEDED FOR ANXIETY      LORazepam (ATIVAN) 1 MG tablet Take 0.5 mg by mouth.      nicotine (NICODERM CQ) 7 mg/24 hr       omeprazole (PRILOSEC) 20 MG capsule TAKE 1 CAPSULE BY MOUTH TWICE DAILY      VENTOLIN HFA 90 mcg/actuation inhaler        No current facility-administered medications for this visit.        Review of patient's allergies indicates:  No Known Allergies    Past Medical History:   Diagnosis Date    COPD (chronic obstructive pulmonary disease)     Hypertension     Kidney stone     Stroke     Tobacco use        Past Surgical History:   Procedure Laterality Date    HYSTERECTOMY      KNEE ARTHROSCOPY W/ ACL RECONSTRUCTION Left     LITHOTRIPSY         Family History   Problem Relation Age of Onset    Kidney disease Mother     Cancer Mother     Liver disease Mother     Breast cancer Mother        Social History     Social History    Marital status: Legally      Spouse name: N/A    Number of children: N/A    Years of education: N/A     Occupational History    Not on file.     Social History Main Topics    Smoking status: Former Smoker     Packs/day: 1.00     Types: Cigarettes    Smokeless tobacco: Never Used      Comment: patches     Alcohol use No    Drug use: No    Sexual activity: Not Currently     Partners: Male     Other Topics Concern    Not on file     Social History  Narrative    No narrative on file       Vitals:    03/22/18 0843   BP: 115/84   Pulse: 89   Temp: 98.4 °F (36.9 °C)       Physical Exam   Constitutional: She is oriented to person, place, and time. She appears well-developed and well-nourished.   HENT:   Head: Normocephalic and atraumatic.   Right Ear: External ear normal.   Left Ear: External ear normal.   Mouth/Throat: No oropharyngeal exudate.   Eyes: Conjunctivae and EOM are normal. Pupils are equal, round, and reactive to light. Right eye exhibits no discharge. Left eye exhibits no discharge. No scleral icterus.   Neck: Normal range of motion. Neck supple. No thyromegaly present.   Cardiovascular: Normal rate, regular rhythm and normal heart sounds.    Pulmonary/Chest: Effort normal and breath sounds normal. Right breast exhibits no inverted nipple, no mass, no nipple discharge, no skin change and no tenderness. Left breast exhibits no inverted nipple, no mass, no nipple discharge, no skin change and no tenderness.   Abdominal: Soft. Bowel sounds are normal.   Musculoskeletal: Normal range of motion. She exhibits no edema.        Right shoulder: She exhibits no crepitus and normal strength.   Lymphadenopathy:        Head (right side): No submental, no submandibular, no tonsillar, no preauricular, no posterior auricular and no occipital adenopathy present.        Head (left side): No submental, no submandibular, no tonsillar, no preauricular, no posterior auricular and no occipital adenopathy present.     She has no cervical adenopathy.        Right cervical: No superficial cervical and no posterior cervical adenopathy present.       Left cervical: No superficial cervical and no posterior cervical adenopathy present.     She has no axillary adenopathy.        Right: No supraclavicular adenopathy present.        Left: No supraclavicular adenopathy present.   Neurological: She is alert and oriented to person, place, and time. She has normal reflexes.   Skin: Skin  is warm and dry. No rash noted. No erythema. No pallor.   Psychiatric: She has a normal mood and affect. Her behavior is normal. Judgment and thought content normal.       Imagin17:  Result:   Mammo Digital Screening Bilat With CAD     History:  Patient is 47 y.o. and is seen for breast cancer screening.           Films Compared:  Baseline exam     Findings:   Computer-aided detection was utilized in the interpretation of this examination.  The breasts have scattered areas of fibroglandular density. Incidental note of bilateral axillary nodes.     Left  There are calcifications in a grouped distribution seen in the retroareolar region of the left breast at 1 o'clock in the anterior depth on the MLO and CC views.      Right  There is no evidence of suspicious masses, calcifications, or other abnormal findings.     Impression:  Left  Calcifications: Left breast calcifications at the retroareolar anterior 1 o'clock position. Assessment: 0 - Incomplete. Special Views: Mag is recommended.      Right  There is no mammographic evidence of malignancy.     BI-RADS Category:   Overall: 0 - Incomplete: Needs Additional Imaging Evaluation     Recommendation:  Special Views: Mag is recommended for the left breast.     The patient's estimated lifetime risk of breast cancer (to age 85) based on Tyrer-Cuzick - 7 risk assessment model is: Tyrer-Cuzick: 18.83 %. According to the American Cancer Society,  patients with a lifetime breast cancer risk of 20% or higher might benefit from supplemental screening tests.           Result:   Mammo Digital Diagnostic Left with CAD     History:  Patient is 47 y.o. and is seen for abnormal mammogram.           Films Compared:  Compared to: 2017 Mammo Digital Screening Bilat With CAD     Findings:   Computer-aided detection was utilized in the interpretation of this examination.  The breast has scattered areas of fibroglandular density.     There are round calcifications in a grouped  distribution seen in the retroareolar region of the left breast at 1 o'clock in the anterior depth on the MLO and CC views. Similar findings noted involving the right breast.      Impression:  Left  Calcifications: Left breast calcifications at the retroareolar anterior 1 o'clock position. Assessment: 3 - Probably benign. Short Interval Follow-Up in 6 Months is recommended.      BI-RADS Category:   Left: 3 - Probably Benign  Overall: 3 - Probably Benign     Recommendation:  Short Interval Follow-Up in 6 Months is recommended for the left breast.     The patient's estimated lifetime risk of breast cancer (to age 85) based on Tyrer-Cuzick - 7 risk assessment model is: Tyrer-Cuzick: 18.83 %. According to the American Cancer Society,  patients with a lifetime breast cancer risk of 20% or higher might benefit from supplemental screening tests.    3/5/18:  Result:   Mammo Digital Diagnostic Left with CAD     History:  Patient is 47 y.o. and is seen for a diagnostic mammogram.     Films Compared:  09/06/2017 Mammo Digital Diagnostic Left with CAD, and 09/01/2017 Mammo Digital Screening Bilat With CAD     Findings:   Computer-aided detection was utilized in the interpretation of this examination.  The breast has scattered areas of fibroglandular density.     There are amorphous calcifications in a grouped distribution seen in the retroareolar region of the left breast at 1 o'clock in the anterior depth on the MLO and CC views. Calcifications have a somewhat more ill-defined amorphous appearance as compared to prior.      Impression:  Left  Calcifications: Left breast calcifications at the retroareolar anterior 1 o'clock position. Assessment: 4 - Suspicious finding. Biopsy is recommended.      BI-RADS Category:   Left: 4 - Suspicious  Overall: 4 - Suspicious     Recommendation:  Biopsy is recommended.     The patient's estimated lifetime risk of breast cancer (to age 85) based on Tyrer-Cuzick - 7 risk assessment model is:  Henrique: 18.83 %. According to the American Cancer Society,  patients with a lifetime breast cancer risk of 20% or higher might benefit from supplemental screening tests.      Risk factors identified:     Menarche at 14 y/o  G 4 P 4  Fist pregnancy at 19 y/o  LMP: Partial hysterectomy at 44 years of age  Estrogen: none  Radiation to the neck or chest wall- none    Prior breast biopsies or atypical hyperplasia- none     FH: Mother breast cancer at unknown age    Assessment & Plan:  Abnormal mammogram  -     Mammo Breast Stereotactic Biopsy Left; Future; Expected date: 03/29/2018    Breast calcifications on mammogram  -     Mammo Breast Stereotactic Biopsy Left; Future; Expected date: 03/29/2018    Counseling regarding goals of care       1.  Explained imaging results which reveal calcifications in the left breast at the 1:00 position.  2.  Negative clinical exam  3.  Recommend stereotactic core needle biopsy of the left breast calcifications.  Risk versus benefits of the procedure were explained to the patient in detail.  Risk of bleeding, bruising, pain, discomfort, infection, and possibly missing the lesion were discussed.  If the pathology returns within normal limits recommendation for six-month follow-up imaging and clinical exam will be made.  If pathology returned suspicious or cancer further surgical intervention may be recommended and or treatment through oncology.  Patient verbalized understanding.  4.  Preoperative, perioperative, and postoperative instructions were given to the patient verbally and in writing.  The patient has been scheduled at [facility] similar location for March 28.  She will see me one week later for test results on April 5.  If we receive her test results prior to her appointment we will give her a call.  Patient's phone number is [phone], daughter-in-law is Annie Chan- [phone]- a shunt has okayed is contacting her if we cannot reach the patient.  Information regarding  treatment for breast cancer was given to the patient in the state required pamphlet form.  This was reviewed with the patient in detail.

## 2018-03-21 ENCOUNTER — TELEPHONE (OUTPATIENT)
Dept: RADIOLOGY | Facility: HOSPITAL | Age: 48
End: 2018-03-21

## 2018-03-22 ENCOUNTER — OFFICE VISIT (OUTPATIENT)
Dept: HEMATOLOGY/ONCOLOGY | Facility: CLINIC | Age: 48
End: 2018-03-22
Payer: MEDICAID

## 2018-03-22 ENCOUNTER — APPOINTMENT (OUTPATIENT)
Dept: RADIOLOGY | Facility: HOSPITAL | Age: 48
End: 2018-03-22
Attending: NURSE PRACTITIONER
Payer: MEDICAID

## 2018-03-22 VITALS
TEMPERATURE: 98 F | OXYGEN SATURATION: 99 % | SYSTOLIC BLOOD PRESSURE: 115 MMHG | HEART RATE: 89 BPM | DIASTOLIC BLOOD PRESSURE: 84 MMHG | WEIGHT: 229.25 LBS | BODY MASS INDEX: 39.14 KG/M2 | HEIGHT: 64 IN

## 2018-03-22 DIAGNOSIS — R92.8 ABNORMAL MAMMOGRAM: Primary | ICD-10-CM

## 2018-03-22 DIAGNOSIS — Z86.73 HISTORY OF CVA (CEREBROVASCULAR ACCIDENT): ICD-10-CM

## 2018-03-22 DIAGNOSIS — N83.202 LEFT OVARIAN CYST: ICD-10-CM

## 2018-03-22 DIAGNOSIS — R92.1 BREAST CALCIFICATIONS ON MAMMOGRAM: ICD-10-CM

## 2018-03-22 DIAGNOSIS — Z71.89 COUNSELING REGARDING GOALS OF CARE: ICD-10-CM

## 2018-03-22 PROCEDURE — 99213 OFFICE O/P EST LOW 20 MIN: CPT | Mod: PBBFAC,25 | Performed by: NURSE PRACTITIONER

## 2018-03-22 PROCEDURE — 76856 US EXAM PELVIC COMPLETE: CPT | Mod: 26,,, | Performed by: RADIOLOGY

## 2018-03-22 PROCEDURE — 99204 OFFICE O/P NEW MOD 45 MIN: CPT | Mod: S$PBB,,, | Performed by: NURSE PRACTITIONER

## 2018-03-22 PROCEDURE — 99999 PR PBB SHADOW E&M-EST. PATIENT-LVL III: CPT | Mod: PBBFAC,,, | Performed by: NURSE PRACTITIONER

## 2018-03-22 PROCEDURE — 76830 TRANSVAGINAL US NON-OB: CPT | Mod: 26,,, | Performed by: RADIOLOGY

## 2018-03-22 PROCEDURE — 76830 TRANSVAGINAL US NON-OB: CPT | Mod: TC,PO

## 2018-03-23 ENCOUNTER — TELEPHONE (OUTPATIENT)
Dept: OBSTETRICS AND GYNECOLOGY | Facility: CLINIC | Age: 48
End: 2018-03-23

## 2018-03-23 NOTE — TELEPHONE ENCOUNTER
----- Message from Rosa Maria Cedeno NP sent at 3/22/2018  3:48 PM CDT -----  Have patient see Castrejon regarding pain and ovarian cysts.

## 2018-03-23 NOTE — TELEPHONE ENCOUNTER
Spoke with pt  Notified of pelvic u/s results given. Scheduled pt with [facility]. Patient verbalized understanding

## 2018-03-29 ENCOUNTER — TELEPHONE (OUTPATIENT)
Dept: RADIOLOGY | Facility: HOSPITAL | Age: 48
End: 2018-03-29

## 2018-03-29 ENCOUNTER — HOSPITAL ENCOUNTER (OUTPATIENT)
Dept: RADIOLOGY | Facility: HOSPITAL | Age: 48
Discharge: HOME | End: 2018-03-29
Attending: INTERNAL MEDICINE
Payer: MEDICAID

## 2018-03-29 DIAGNOSIS — R92.1 BREAST CALCIFICATIONS ON MAMMOGRAM: ICD-10-CM

## 2018-03-29 DIAGNOSIS — R92.8 ABNORMAL MAMMOGRAM: ICD-10-CM

## 2018-03-29 PROCEDURE — 19081 BX BREAST 1ST LESION STRTCTC: CPT | Mod: LT,,, | Performed by: RADIOLOGY

## 2018-03-29 PROCEDURE — 88305 TISSUE EXAM BY PATHOLOGIST: CPT | Performed by: PATHOLOGY

## 2018-03-29 PROCEDURE — 19081 BX BREAST 1ST LESION STRTCTC: CPT | Mod: TC,PO,LT

## 2018-03-29 PROCEDURE — 88305 TISSUE EXAM BY PATHOLOGIST: CPT | Mod: 26,,, | Performed by: PATHOLOGY

## 2018-03-29 NOTE — NURSING
Pressure held on left breast biopsy site for 10 mins, hemostasis was achieved, steri strips were applied, and wound was covered with 4x4 gauze and a tegaderm.  Dressing clean, dry and intact with no drainage noted.  Discharge instructions given verbally and in writing, patient voiced understandings. Patient discharged and accompanied by family members.

## 2018-04-04 ENCOUNTER — HOSPITAL ENCOUNTER (INPATIENT)
Facility: HOSPITAL | Age: 48
LOS: 4 days | Discharge: HOME | DRG: 871 | End: 2018-04-08
Attending: EMERGENCY MEDICINE | Admitting: INTERNAL MEDICINE
Payer: MEDICAID

## 2018-04-04 DIAGNOSIS — J96.01 ACUTE RESPIRATORY FAILURE WITH HYPOXIA: ICD-10-CM

## 2018-04-04 DIAGNOSIS — J18.9 PNEUMONIA OF LEFT LOWER LOBE DUE TO INFECTIOUS ORGANISM: Primary | ICD-10-CM

## 2018-04-04 DIAGNOSIS — I10 ESSENTIAL HYPERTENSION: ICD-10-CM

## 2018-04-04 DIAGNOSIS — R06.02 SHORTNESS OF BREATH: ICD-10-CM

## 2018-04-04 DIAGNOSIS — A41.9 SEPSIS: ICD-10-CM

## 2018-04-04 DIAGNOSIS — I51.7 CARDIOMEGALY: ICD-10-CM

## 2018-04-04 DIAGNOSIS — R06.02 SOB (SHORTNESS OF BREATH): ICD-10-CM

## 2018-04-04 PROBLEM — J44.9 COPD (CHRONIC OBSTRUCTIVE PULMONARY DISEASE): Status: ACTIVE | Noted: 2018-04-04
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
## 2018-04-04 PROBLEM — E87.6 HYPOKALEMIA: Status: ACTIVE | Noted: 2018-04-04

## 2018-04-04 PROBLEM — Z72.0 TOBACCO ABUSE: Status: ACTIVE | Noted: 2018-04-04

## 2018-04-04 LAB
ALBUMIN SERPL BCP-MCNC: 3.6 G/DL
ALLENS TEST: ABNORMAL
ALP SERPL-CCNC: 85 U/L
ALT SERPL W/O P-5'-P-CCNC: 15 U/L
ANION GAP SERPL CALC-SCNC: 12 MMOL/L
AST SERPL-CCNC: 25 U/L
BASOPHILS # BLD AUTO: 0.04 K/UL
BASOPHILS NFR BLD: 0.3 %
BILIRUB SERPL-MCNC: 0.2 MG/DL
BILIRUB UR QL STRIP: NEGATIVE
BUN SERPL-MCNC: 12 MG/DL
CALCIUM SERPL-MCNC: 9.4 MG/DL
CHLORIDE SERPL-SCNC: 100 MMOL/L
CLARITY UR: CLEAR
CO2 SERPL-SCNC: 23 MMOL/L
COLOR UR: YELLOW
CREAT SERPL-MCNC: 0.8 MG/DL
DELSYS: ABNORMAL
DIFFERENTIAL METHOD: ABNORMAL
EOSINOPHIL # BLD AUTO: 0 K/UL
EOSINOPHIL NFR BLD: 0.3 %
ERYTHROCYTE [DISTWIDTH] IN BLOOD BY AUTOMATED COUNT: 13.2 %
EST. GFR  (AFRICAN AMERICAN): >60 ML/MIN/1.73 M^2
EST. GFR  (NON AFRICAN AMERICAN): >60 ML/MIN/1.73 M^2
FIO2: 21
FLUAV AG SPEC QL IA: NEGATIVE
FLUBV AG SPEC QL IA: NEGATIVE
GLUCOSE SERPL-MCNC: 94 MG/DL
GLUCOSE UR QL STRIP: NEGATIVE
HCO3 UR-SCNC: 21.7 MMOL/L (ref 24–28)
HCT VFR BLD AUTO: 40.2 %
HGB BLD-MCNC: 12.9 G/DL
HGB UR QL STRIP: ABNORMAL
KETONES UR QL STRIP: NEGATIVE
LACTATE SERPL-SCNC: 0.9 MMOL/L
LEUKOCYTE ESTERASE UR QL STRIP: NEGATIVE
LYMPHOCYTES # BLD AUTO: 0.5 K/UL
LYMPHOCYTES NFR BLD: 3.4 %
MCH RBC QN AUTO: 29.8 PG
MCHC RBC AUTO-ENTMCNC: 32.1 G/DL
MCV RBC AUTO: 93 FL
MODE: ABNORMAL
MONOCYTES # BLD AUTO: 0.9 K/UL
MONOCYTES NFR BLD: 6.2 %
NEUTROPHILS # BLD AUTO: 12.5 K/UL
NEUTROPHILS NFR BLD: 89.8 %
NITRITE UR QL STRIP: NEGATIVE
PCO2 BLDA: 28.2 MMHG (ref 35–45)
PH SMN: 7.5 [PH] (ref 7.35–7.45)
PH UR STRIP: 6 [PH] (ref 5–8)
PLATELET # BLD AUTO: 275 K/UL
PMV BLD AUTO: 10.4 FL
PO2 BLDA: 49 MMHG (ref 80–100)
POC BE: -2 MMOL/L
POC SATURATED O2: 88 % (ref 95–100)
POTASSIUM SERPL-SCNC: 3.3 MMOL/L
PROCALCITONIN SERPL IA-MCNC: 0.73 NG/ML
PROT SERPL-MCNC: 7.3 G/DL
PROT UR QL STRIP: NEGATIVE
RBC # BLD AUTO: 4.33 M/UL
SAMPLE: ABNORMAL
SITE: ABNORMAL
SODIUM SERPL-SCNC: 135 MMOL/L
SP GR UR STRIP: 1.01 (ref 1–1.03)
SPECIMEN SOURCE: NORMAL
URN SPEC COLLECT METH UR: ABNORMAL
UROBILINOGEN UR STRIP-ACNC: NEGATIVE EU/DL
WBC # BLD AUTO: 13.9 K/UL

## 2018-04-04 PROCEDURE — 36415 COLL VENOUS BLD VENIPUNCTURE: CPT

## 2018-04-04 PROCEDURE — 25000003 PHARM REV CODE 250: Performed by: NURSE PRACTITIONER

## 2018-04-04 PROCEDURE — 94640 AIRWAY INHALATION TREATMENT: CPT

## 2018-04-04 PROCEDURE — 80053 COMPREHEN METABOLIC PANEL: CPT

## 2018-04-04 PROCEDURE — 63600175 PHARM REV CODE 636 W HCPCS: Performed by: EMERGENCY MEDICINE

## 2018-04-04 PROCEDURE — 27000221 HC OXYGEN, UP TO 24 HOURS

## 2018-04-04 PROCEDURE — 36600 WITHDRAWAL OF ARTERIAL BLOOD: CPT

## 2018-04-04 PROCEDURE — 87086 URINE CULTURE/COLONY COUNT: CPT

## 2018-04-04 PROCEDURE — 87400 INFLUENZA A/B EACH AG IA: CPT | Mod: 59

## 2018-04-04 PROCEDURE — 11000001 HC ACUTE MED/SURG PRIVATE ROOM

## 2018-04-04 PROCEDURE — 25000242 PHARM REV CODE 250 ALT 637 W/ HCPCS: Performed by: EMERGENCY MEDICINE

## 2018-04-04 PROCEDURE — 83605 ASSAY OF LACTIC ACID: CPT | Mod: 91

## 2018-04-04 PROCEDURE — 25000003 PHARM REV CODE 250: Performed by: EMERGENCY MEDICINE

## 2018-04-04 PROCEDURE — 99900035 HC TECH TIME PER 15 MIN (STAT)

## 2018-04-04 PROCEDURE — 93010 ELECTROCARDIOGRAM REPORT: CPT | Mod: ,,, | Performed by: INTERNAL MEDICINE

## 2018-04-04 PROCEDURE — 96375 TX/PRO/DX INJ NEW DRUG ADDON: CPT | Mod: 59

## 2018-04-04 PROCEDURE — 93005 ELECTROCARDIOGRAM TRACING: CPT

## 2018-04-04 PROCEDURE — 87040 BLOOD CULTURE FOR BACTERIA: CPT

## 2018-04-04 PROCEDURE — 84145 PROCALCITONIN (PCT): CPT

## 2018-04-04 PROCEDURE — 83605 ASSAY OF LACTIC ACID: CPT

## 2018-04-04 PROCEDURE — 96374 THER/PROPH/DIAG INJ IV PUSH: CPT | Mod: 59

## 2018-04-04 PROCEDURE — 81003 URINALYSIS AUTO W/O SCOPE: CPT

## 2018-04-04 PROCEDURE — 85025 COMPLETE CBC W/AUTO DIFF WBC: CPT

## 2018-04-04 PROCEDURE — 99291 CRITICAL CARE FIRST HOUR: CPT | Mod: 25

## 2018-04-04 RX ORDER — ACETAMINOPHEN 325 MG/1
650 TABLET ORAL EVERY 6 HOURS PRN
Status: DISCONTINUED | OUTPATIENT
Start: 2018-04-04 | End: 2018-04-08 | Stop reason: HOSPADM

## 2018-04-04 RX ORDER — SODIUM CHLORIDE 9 MG/ML
INJECTION, SOLUTION INTRAVENOUS CONTINUOUS
Status: DISCONTINUED | OUTPATIENT
Start: 2018-04-04 | End: 2018-04-05

## 2018-04-04 RX ORDER — POTASSIUM CHLORIDE 750 MG/1
30 TABLET, EXTENDED RELEASE ORAL ONCE
Status: COMPLETED | OUTPATIENT
Start: 2018-04-04 | End: 2018-04-05

## 2018-04-04 RX ORDER — KETOROLAC TROMETHAMINE 30 MG/ML
30 INJECTION, SOLUTION INTRAMUSCULAR; INTRAVENOUS
Status: COMPLETED | OUTPATIENT
Start: 2018-04-04 | End: 2018-04-04

## 2018-04-04 RX ORDER — MORPHINE SULFATE 4 MG/ML
2 INJECTION, SOLUTION INTRAMUSCULAR; INTRAVENOUS EVERY 6 HOURS PRN
Status: DISCONTINUED | OUTPATIENT
Start: 2018-04-04 | End: 2018-04-08 | Stop reason: HOSPADM

## 2018-04-04 RX ORDER — CARVEDILOL 12.5 MG/1
12.5 TABLET ORAL 2 TIMES DAILY
Status: DISCONTINUED | OUTPATIENT
Start: 2018-04-05 | End: 2018-04-08 | Stop reason: HOSPADM

## 2018-04-04 RX ORDER — DIPHENHYDRAMINE HCL 25 MG
25 CAPSULE ORAL NIGHTLY
Status: ON HOLD | COMMUNITY
End: 2018-09-18

## 2018-04-04 RX ORDER — CEFTRIAXONE 1 G/1
1 INJECTION, POWDER, FOR SOLUTION INTRAMUSCULAR; INTRAVENOUS
Status: DISCONTINUED | OUTPATIENT
Start: 2018-04-04 | End: 2018-04-04

## 2018-04-04 RX ORDER — CEFTRIAXONE 1 G/1
1 INJECTION, POWDER, FOR SOLUTION INTRAMUSCULAR; INTRAVENOUS
Status: COMPLETED | OUTPATIENT
Start: 2018-04-04 | End: 2018-04-04

## 2018-04-04 RX ORDER — IBUPROFEN 200 MG
1 TABLET ORAL DAILY
Status: DISCONTINUED | OUTPATIENT
Start: 2018-04-05 | End: 2018-04-08 | Stop reason: HOSPADM

## 2018-04-04 RX ORDER — POTASSIUM CHLORIDE 20 MEQ/1
20 TABLET, EXTENDED RELEASE ORAL 2 TIMES DAILY
Status: ON HOLD | COMMUNITY
End: 2018-09-18 | Stop reason: SDUPTHER

## 2018-04-04 RX ORDER — HYDRALAZINE HYDROCHLORIDE 20 MG/ML
10 INJECTION INTRAMUSCULAR; INTRAVENOUS EVERY 6 HOURS PRN
Status: DISCONTINUED | OUTPATIENT
Start: 2018-04-04 | End: 2018-04-08 | Stop reason: HOSPADM

## 2018-04-04 RX ORDER — IPRATROPIUM BROMIDE AND ALBUTEROL SULFATE 2.5; .5 MG/3ML; MG/3ML
3 SOLUTION RESPIRATORY (INHALATION)
Status: COMPLETED | OUTPATIENT
Start: 2018-04-04 | End: 2018-04-04

## 2018-04-04 RX ORDER — IPRATROPIUM BROMIDE AND ALBUTEROL SULFATE 2.5; .5 MG/3ML; MG/3ML
3 SOLUTION RESPIRATORY (INHALATION)
Status: DISCONTINUED | OUTPATIENT
Start: 2018-04-05 | End: 2018-04-05

## 2018-04-04 RX ORDER — DM/P-EPHED/ACETAMINOPH/DOXYLAM 30-7.5/3
2 LIQUID (ML) ORAL
Status: ON HOLD | COMMUNITY
End: 2018-09-18 | Stop reason: HOSPADM

## 2018-04-04 RX ORDER — MORPHINE SULFATE 4 MG/ML
4 INJECTION, SOLUTION INTRAMUSCULAR; INTRAVENOUS
Status: COMPLETED | OUTPATIENT
Start: 2018-04-04 | End: 2018-04-04

## 2018-04-04 RX ORDER — PANTOPRAZOLE SODIUM 40 MG/1
40 TABLET, DELAYED RELEASE ORAL DAILY
Status: DISCONTINUED | OUTPATIENT
Start: 2018-04-05 | End: 2018-04-08 | Stop reason: HOSPADM

## 2018-04-04 RX ORDER — DULOXETIN HYDROCHLORIDE 30 MG/1
60 CAPSULE, DELAYED RELEASE ORAL DAILY
Status: DISCONTINUED | OUTPATIENT
Start: 2018-04-05 | End: 2018-04-08 | Stop reason: HOSPADM

## 2018-04-04 RX ORDER — ACETAMINOPHEN 500 MG
1000 TABLET ORAL
Status: COMPLETED | OUTPATIENT
Start: 2018-04-04 | End: 2018-04-04

## 2018-04-04 RX ORDER — ATORVASTATIN CALCIUM 40 MG/1
40 TABLET, FILM COATED ORAL DAILY
Status: DISCONTINUED | OUTPATIENT
Start: 2018-04-05 | End: 2018-04-05

## 2018-04-04 RX ORDER — HYDROCODONE BITARTRATE AND ACETAMINOPHEN 10; 325 MG/1; MG/1
1 TABLET ORAL EVERY 6 HOURS PRN
Status: DISCONTINUED | OUTPATIENT
Start: 2018-04-04 | End: 2018-04-08 | Stop reason: HOSPADM

## 2018-04-04 RX ORDER — ASPIRIN 325 MG
325 TABLET, DELAYED RELEASE (ENTERIC COATED) ORAL DAILY
Status: DISCONTINUED | OUTPATIENT
Start: 2018-04-05 | End: 2018-04-08 | Stop reason: HOSPADM

## 2018-04-04 RX ORDER — DIPHENHYDRAMINE HCL 25 MG
25 CAPSULE ORAL EVERY 6 HOURS PRN
Status: DISCONTINUED | OUTPATIENT
Start: 2018-04-04 | End: 2018-04-08 | Stop reason: HOSPADM

## 2018-04-04 RX ORDER — GUAIFENESIN 600 MG/1
600 TABLET, EXTENDED RELEASE ORAL 2 TIMES DAILY
Status: DISCONTINUED | OUTPATIENT
Start: 2018-04-04 | End: 2018-04-08 | Stop reason: HOSPADM

## 2018-04-04 RX ORDER — GABAPENTIN 300 MG/1
600 CAPSULE ORAL 2 TIMES DAILY
Status: DISCONTINUED | OUTPATIENT
Start: 2018-04-05 | End: 2018-04-08 | Stop reason: HOSPADM

## 2018-04-04 RX ORDER — ONDANSETRON 2 MG/ML
4 INJECTION INTRAMUSCULAR; INTRAVENOUS EVERY 8 HOURS PRN
Status: DISCONTINUED | OUTPATIENT
Start: 2018-04-04 | End: 2018-04-08 | Stop reason: HOSPADM

## 2018-04-04 RX ORDER — CEFTRIAXONE 1 G/1
1 INJECTION, POWDER, FOR SOLUTION INTRAMUSCULAR; INTRAVENOUS
Status: DISCONTINUED | OUTPATIENT
Start: 2018-04-05 | End: 2018-04-04

## 2018-04-04 RX ADMIN — SODIUM CHLORIDE 1000 ML: 0.9 INJECTION, SOLUTION INTRAVENOUS at 08:04

## 2018-04-04 RX ADMIN — SODIUM CHLORIDE: 0.9 INJECTION, SOLUTION INTRAVENOUS at 11:04

## 2018-04-04 RX ADMIN — ACETAMINOPHEN 1000 MG: 500 TABLET ORAL at 07:04

## 2018-04-04 RX ADMIN — CEFTRIAXONE SODIUM 1 G: 1 INJECTION, POWDER, FOR SOLUTION INTRAMUSCULAR; INTRAVENOUS at 08:04

## 2018-04-04 RX ADMIN — MORPHINE SULFATE 4 MG: 4 INJECTION INTRAVENOUS at 08:04

## 2018-04-04 RX ADMIN — SODIUM CHLORIDE: 0.9 INJECTION, SOLUTION INTRAVENOUS at 08:04

## 2018-04-04 RX ADMIN — AZITHROMYCIN MONOHYDRATE 500 MG: 500 INJECTION, POWDER, LYOPHILIZED, FOR SOLUTION INTRAVENOUS at 11:04

## 2018-04-04 RX ADMIN — SODIUM CHLORIDE 1000 ML: 0.9 INJECTION, SOLUTION INTRAVENOUS at 07:04

## 2018-04-04 RX ADMIN — IPRATROPIUM BROMIDE AND ALBUTEROL SULFATE 3 ML: .5; 3 SOLUTION RESPIRATORY (INHALATION) at 06:04

## 2018-04-04 RX ADMIN — KETOROLAC TROMETHAMINE 30 MG: 30 INJECTION, SOLUTION INTRAMUSCULAR; INTRAVENOUS at 08:04

## 2018-04-04 NOTE — ED PROVIDER NOTES
SCRIBE #1 NOTE: I, Corinne Mack, am scribing for, and in the presence of, Santos Anthony Do, MD. I have scribed the entire note.     SCRIBE #2 NOTE: I, Kwabena Cummings, am scribing for, and in the presence of,  Santos Anthony Do, MD. I have scribed the remaining portions of the note not scribed by Scribe #1.     History      Chief Complaint   Patient presents with    Shortness of Breath     and chest pain with increased falling        Review of patient's allergies indicates:  No Known Allergies     HPI   HPI    4/4/2018, 6:19 PM   History obtained from the patient      History of Present Illness: Valorie Monzon is a 47 y.o. female patient with PMHx of tobacco use, COPD, and HTN who presents to the Emergency Department for worsening SOB which onset gradually a few days ago. Symptoms are constant and moderate in severity. No mitigating or exacerbating factors reported. Associated sxs include fever (101.8) and CP. Patient denies any chills, N/V, abd pain, back pain, neck pain, HA, dizziness, and all other sxs at this time. No prior Tx reported. No further complaints or concerns at this time.         Arrival mode: Personal vehicle    PCP: Karri Levy MD       Past Medical History:  Past Medical History:   Diagnosis Date    COPD (chronic obstructive pulmonary disease)     Hypertension     Kidney stone     Stroke     Tobacco use        Past Surgical History:  Past Surgical History:   Procedure Laterality Date    HYSTERECTOMY      KNEE ARTHROSCOPY W/ ACL RECONSTRUCTION Left     LITHOTRIPSY           Family History:  Family History   Problem Relation Age of Onset    Kidney disease Mother     Cancer Mother     Liver disease Mother     Breast cancer Mother        Social History:  Social History     Social History Main Topics    Smoking status: Former Smoker     Packs/day: 1.00     Types: Cigarettes    Smokeless tobacco: Never Used      Comment: patches     Alcohol use No    Drug use: No    Sexual  activity: Not Currently     Partners: Male       ROS   Review of Systems   Constitutional: Positive for fever (101.8). Negative for chills.   Respiratory: Positive for shortness of breath. Negative for cough.    Cardiovascular: Positive for chest pain. Negative for leg swelling.   Gastrointestinal: Negative for abdominal pain, diarrhea, nausea and vomiting.   Musculoskeletal: Negative for back pain, neck pain and neck stiffness.   Skin: Negative for rash and wound.   Neurological: Negative for dizziness, light-headedness, numbness and headaches.   All other systems reviewed and are negative.    Physical Exam      Initial Vitals [04/04/18 1753]   BP Pulse Resp Temp SpO2   132/73 (!) 130 20 (!) 101.3 °F (38.5 °C) (!) 89 %      MAP       92.67          Physical Exam  Nursing Notes and Vital Signs Reviewed.  Constitutional: Patient is in no apparent distress. Well-developed and well-nourished. Obese.  Head: Atraumatic. Normocephalic.  Eyes: PERRL. EOM intact. Conjunctivae are not pale. No scleral icterus.  ENT: Mucous membranes are moist. Oropharynx is clear and symmetric.    Neck: Supple. Full ROM. No lymphadenopathy.  Cardiovascular: Regular rate. Regular rhythm. No murmurs, rubs, or gallops. Distal pulses are 2+ and symmetric.  Pulmonary/Chest: No respiratory distress. Course breath sounds bilaterally with diffuse wheezes. No rales.  Abdominal: Soft and non-distended.  There is no tenderness.  No rebound, guarding, or rigidity.   Musculoskeletal: Moves all extremities. No obvious deformities. No edema. No calf tenderness.  Skin: Warm to touch and dry.  Neurological:  Alert, awake, and appropriate.  Normal speech.  No acute focal neurological deficits are appreciated.  Psychiatric: Normal affect. Good eye contact. Appropriate in content.    ED Course    Critical Care  Date/Time: 4/4/2018 8:26 PM  Performed by: RAISSA ARCHER  Authorized by: RAISSA ARCHER   Direct patient critical care time: 16  "minutes  Additional history critical care time: 4 minutes  Ordering / reviewing critical care time: 7 minutes  Documentation critical care time: 8 minutes  Total critical care time (exclusive of procedural time) : 35 minutes  Critical care time was exclusive of separately billable procedures and treating other patients.  Critical care was necessary to treat or prevent imminent or life-threatening deterioration of the following conditions: respiratory failure.  Critical care was time spent personally by me on the following activities: blood draw for specimens, development of treatment plan with patient or surrogate, interpretation of cardiac output measurements, evaluation of patient's response to treatment, examination of patient, obtaining history from patient or surrogate, ordering and performing treatments and interventions, ordering and review of laboratory studies, ordering and review of radiographic studies, pulse oximetry and re-evaluation of patient's condition.        ED Vital Signs:  Vitals:    04/04/18 1753 04/04/18 1845 04/04/18 1906   BP: 132/73     Pulse: (!) 130 (!) 119    Resp: 20 14    Temp: (!) 101.3 °F (38.5 °C)  99.9 °F (37.7 °C)   TempSrc: Oral     SpO2: (!) 89% 99%    Weight: 102.1 kg (225 lb)     Height: 5' 4" (1.626 m)         Abnormal Lab Results:  Labs Reviewed   CBC W/ AUTO DIFFERENTIAL - Abnormal; Notable for the following:        Result Value    WBC 13.90 (*)     Gran # (ANC) 12.5 (*)     Lymph # 0.5 (*)     Gran% 89.8 (*)     Lymph% 3.4 (*)     All other components within normal limits   COMPREHENSIVE METABOLIC PANEL - Abnormal; Notable for the following:     Sodium 135 (*)     Potassium 3.3 (*)     All other components within normal limits   URINALYSIS - Abnormal; Notable for the following:     Occult Blood UA Trace (*)     All other components within normal limits   PROCALCITONIN - Abnormal; Notable for the following:     Procalcitonin 0.73 (*)     All other components within normal " limits   ISTAT PROCEDURE - Abnormal; Notable for the following:     POC PH 7.495 (*)     POC PCO2 28.2 (*)     POC PO2 49 (*)     POC HCO3 21.7 (*)     POC SATURATED O2 88 (*)     All other components within normal limits   CULTURE, BLOOD   CULTURE, BLOOD   CULTURE, URINE   LACTIC ACID, PLASMA   INFLUENZA A AND B ANTIGEN        All Lab Results:  Results for orders placed or performed during the hospital encounter of 04/04/18   CBC auto differential   Result Value Ref Range    WBC 13.90 (H) 3.90 - 12.70 K/uL    RBC 4.33 4.00 - 5.40 M/uL    Hemoglobin 12.9 12.0 - 16.0 g/dL    Hematocrit 40.2 37.0 - 48.5 %    MCV 93 82 - 98 fL    MCH 29.8 27.0 - 31.0 pg    MCHC 32.1 32.0 - 36.0 g/dL    RDW 13.2 11.5 - 14.5 %    Platelets 275 150 - 350 K/uL    MPV 10.4 9.2 - 12.9 fL    Gran # (ANC) 12.5 (H) 1.8 - 7.7 K/uL    Lymph # 0.5 (L) 1.0 - 4.8 K/uL    Mono # 0.9 0.3 - 1.0 K/uL    Eos # 0.0 0.0 - 0.5 K/uL    Baso # 0.04 0.00 - 0.20 K/uL    Gran% 89.8 (H) 38.0 - 73.0 %    Lymph% 3.4 (L) 18.0 - 48.0 %    Mono% 6.2 4.0 - 15.0 %    Eosinophil% 0.3 0.0 - 8.0 %    Basophil% 0.3 0.0 - 1.9 %    Differential Method Automated    Comprehensive metabolic panel   Result Value Ref Range    Sodium 135 (L) 136 - 145 mmol/L    Potassium 3.3 (L) 3.5 - 5.1 mmol/L    Chloride 100 95 - 110 mmol/L    CO2 23 23 - 29 mmol/L    Glucose 94 70 - 110 mg/dL    BUN, Bld 12 6 - 20 mg/dL    Creatinine 0.8 0.5 - 1.4 mg/dL    Calcium 9.4 8.7 - 10.5 mg/dL    Total Protein 7.3 6.0 - 8.4 g/dL    Albumin 3.6 3.5 - 5.2 g/dL    Total Bilirubin 0.2 0.1 - 1.0 mg/dL    Alkaline Phosphatase 85 55 - 135 U/L    AST 25 10 - 40 U/L    ALT 15 10 - 44 U/L    Anion Gap 12 8 - 16 mmol/L    eGFR if African American >60 >60 mL/min/1.73 m^2    eGFR if non African American >60 >60 mL/min/1.73 m^2   Lactic acid, plasma #1   Result Value Ref Range    Lactate (Lactic Acid) 0.9 0.5 - 2.2 mmol/L   Urinalysis   Result Value Ref Range    Specimen UA Urine, Clean Catch     Color, UA Yellow  Yellow, Straw, Ana    Appearance, UA Clear Clear    pH, UA 6.0 5.0 - 8.0    Specific Gravity, UA 1.015 1.005 - 1.030    Protein, UA Negative Negative    Glucose, UA Negative Negative    Ketones, UA Negative Negative    Bilirubin (UA) Negative Negative    Occult Blood UA Trace (A) Negative    Nitrite, UA Negative Negative    Urobilinogen, UA Negative <2.0 EU/dL    Leukocytes, UA Negative Negative   Influenza antigen Nasopharyngeal Swab   Result Value Ref Range    Influenza A Ag, EIA Negative Negative    Influenza B Ag, EIA Negative Negative    Flu A & B Source Nasopharyngeal Swab    Procalcitonin   Result Value Ref Range    Procalcitonin 0.73 (H) <0.25 ng/mL   ISTAT PROCEDURE   Result Value Ref Range    POC PH 7.495 (H) 7.35 - 7.45    POC PCO2 28.2 (LL) 35 - 45 mmHg    POC PO2 49 (LL) 80 - 100 mmHg    POC HCO3 21.7 (L) 24 - 28 mmol/L    POC BE -2 -2 to 2 mmol/L    POC SATURATED O2 88 (L) 95 - 100 %    Sample ARTERIAL     Site LR     Allens Test Pass     DelSys Room Air     Mode SPONT     FiO2 21        Imaging Results:  Imaging Results          X-Ray Chest AP Portable (Final result)  Result time 04/04/18 19:10:25    Final result by CALIN Galvan Sr., MD (04/04/18 19:10:25)                 Impression:        1. There is a moderate amount of alveolar consolidation scattered throughout the lower two thirds of the left lung. This is consistent with the patient's history and characteristic of pneumonia.  2. There is mild cardiomegaly.      Electronically signed by: CALIN GALVAN MD  Date:     04/04/18  Time:    19:10              Narrative:    One-view chest x-ray    Clinical History: Sepsis    Finding: Comparison was made to a prior examination performed on 4/15/2015. The size of the heart is prominent. The lungs are clear. There is a moderate amount of alveolar consolidation scattered throughout the lower two thirds of the left lung. The right lung is clear. There is no pneumothorax. The costophrenic angles are sharp.                                The EKG was ordered, reviewed, and independently interpreted by the ED provider.  Interpretation time: 1838  Rate: 122 BPM  Rhythm: sinus tachycardia  Interpretation: Low voltage QRS. Septal infarct. No STEMI.             The Emergency Provider reviewed the vital signs and test results, which are outlined above.    ED Discussion     8:20 PM: Re-evaluated pt. I have discussed test results, shared treatment plan, and the need for admission with patient and family at bedside. Pt and family express understanding at this time and agree with all information. All questions answered. Pt and family have no further questions or concerns at this time. Pt is ready for admit.    8:25 PM: Discussed case with Dr. Rodrigez ([facility]). Dr. Rodrigez agrees with current care and management of pt and accepts admission.   Admitting Service: Hospital medicine   Admitting Physician: Dr. Rodrigez  Admit to: [facility]        ED Medication(s):  Medications   cefTRIAXone injection 1 g (not administered)   azithromycin 500 mg in 0.9 % sodium chloride 250 mL IVPB (ready to mix system) (not administered)   morphine injection 4 mg (not administered)   ketorolac injection 30 mg (not administered)   sodium chloride 0.9% bolus 1,000 mL (1,000 mLs Intravenous New Bag 4/4/18 1906)   acetaminophen tablet 1,000 mg (1,000 mg Oral Given 4/4/18 1906)   albuterol-ipratropium 2.5mg-0.5mg/3mL nebulizer solution 3 mL (3 mLs Nebulization Given 4/4/18 1845)       New Prescriptions    No medications on file             Medical Decision Making    Medical Decision Making:   Clinical Tests:   Lab Tests: Ordered and Reviewed  Radiological Study: Ordered and Reviewed  Medical Tests: Ordered and Reviewed           Scribe Attestation:   Scribe #1: I performed the above scribed service and the documentation accurately describes the services I performed. I attest to the accuracy of the note.    Attending:   Physician Attestation Statement  for Scribe #1: I, Santos Anthony Do, MD, personally performed the services described in this documentation, as scribed by Corinne Mack, in my presence, and it is both accurate and complete.       Scribe Attestation:   Scribe #2: I performed the above scribed service and the documentation accurately describes the services I performed. I attest to the accuracy of the note.    Attending Attestation:           Physician Attestation for Scribe:    Physician Attestation Statement for Scribe #2: I, Santos Anthony Do, MD, reviewed documentation, as scribed by Kwabena Cummings in my presence, and it is both accurate and complete. I also acknowledge and confirm the content of the note done by Scribe #1.          Clinical Impression       ICD-10-CM ICD-9-CM   1. Pneumonia of left lower lobe due to infectious organism J18.1 486   2. Shortness of breath R06.02 786.05   3. Acute respiratory failure with hypoxia J96.01 518.81       Disposition:   Disposition: Admitted  Condition: Fair           Santos Anthony Do, MD  04/04/18 2059

## 2018-04-05 LAB
ANION GAP SERPL CALC-SCNC: 10 MMOL/L
BASOPHILS # BLD AUTO: 0.02 K/UL
BASOPHILS NFR BLD: 0.1 %
BUN SERPL-MCNC: 17 MG/DL
CALCIUM SERPL-MCNC: 8.4 MG/DL
CHLORIDE SERPL-SCNC: 104 MMOL/L
CO2 SERPL-SCNC: 23 MMOL/L
CREAT SERPL-MCNC: 0.8 MG/DL
DIFFERENTIAL METHOD: ABNORMAL
EOSINOPHIL # BLD AUTO: 0 K/UL
EOSINOPHIL NFR BLD: 0.3 %
ERYTHROCYTE [DISTWIDTH] IN BLOOD BY AUTOMATED COUNT: 13.7 %
EST. GFR  (AFRICAN AMERICAN): >60 ML/MIN/1.73 M^2
EST. GFR  (NON AFRICAN AMERICAN): >60 ML/MIN/1.73 M^2
GLUCOSE SERPL-MCNC: 97 MG/DL
HCT VFR BLD AUTO: 36.8 %
HGB BLD-MCNC: 11.6 G/DL
LACTATE SERPL-SCNC: 1 MMOL/L
LYMPHOCYTES # BLD AUTO: 0.8 K/UL
LYMPHOCYTES NFR BLD: 5.3 %
MCH RBC QN AUTO: 29.5 PG
MCHC RBC AUTO-ENTMCNC: 31.5 G/DL
MCV RBC AUTO: 94 FL
MONOCYTES # BLD AUTO: 0.9 K/UL
MONOCYTES NFR BLD: 6 %
NEUTROPHILS # BLD AUTO: 13.8 K/UL
NEUTROPHILS NFR BLD: 88.3 %
PLATELET # BLD AUTO: 236 K/UL
PMV BLD AUTO: 10.4 FL
POTASSIUM SERPL-SCNC: 3.9 MMOL/L
RBC # BLD AUTO: 3.93 M/UL
SODIUM SERPL-SCNC: 137 MMOL/L
WBC # BLD AUTO: 15.61 K/UL

## 2018-04-05 PROCEDURE — 21400001 HC TELEMETRY ROOM

## 2018-04-05 PROCEDURE — 25000003 PHARM REV CODE 250: Performed by: NURSE PRACTITIONER

## 2018-04-05 PROCEDURE — 80048 BASIC METABOLIC PNL TOTAL CA: CPT

## 2018-04-05 PROCEDURE — 94640 AIRWAY INHALATION TREATMENT: CPT

## 2018-04-05 PROCEDURE — 25000003 PHARM REV CODE 250: Performed by: INTERNAL MEDICINE

## 2018-04-05 PROCEDURE — 85025 COMPLETE CBC W/AUTO DIFF WBC: CPT

## 2018-04-05 PROCEDURE — 63600175 PHARM REV CODE 636 W HCPCS: Performed by: NURSE PRACTITIONER

## 2018-04-05 PROCEDURE — 27000221 HC OXYGEN, UP TO 24 HOURS

## 2018-04-05 PROCEDURE — 94664 DEMO&/EVAL PT USE INHALER: CPT

## 2018-04-05 PROCEDURE — 25000003 PHARM REV CODE 250: Performed by: FAMILY MEDICINE

## 2018-04-05 PROCEDURE — 11000001 HC ACUTE MED/SURG PRIVATE ROOM

## 2018-04-05 PROCEDURE — S4991 NICOTINE PATCH NONLEGEND: HCPCS | Performed by: NURSE PRACTITIONER

## 2018-04-05 PROCEDURE — 36415 COLL VENOUS BLD VENIPUNCTURE: CPT

## 2018-04-05 PROCEDURE — 25000242 PHARM REV CODE 250 ALT 637 W/ HCPCS: Performed by: NURSE PRACTITIONER

## 2018-04-05 PROCEDURE — 63600175 PHARM REV CODE 636 W HCPCS: Performed by: INTERNAL MEDICINE

## 2018-04-05 PROCEDURE — 94761 N-INVAS EAR/PLS OXIMETRY MLT: CPT

## 2018-04-05 PROCEDURE — 99900035 HC TECH TIME PER 15 MIN (STAT)

## 2018-04-05 RX ORDER — IPRATROPIUM BROMIDE AND ALBUTEROL SULFATE 2.5; .5 MG/3ML; MG/3ML
3 SOLUTION RESPIRATORY (INHALATION) EVERY 4 HOURS
Status: DISCONTINUED | OUTPATIENT
Start: 2018-04-06 | End: 2018-04-08 | Stop reason: HOSPADM

## 2018-04-05 RX ORDER — FUROSEMIDE 10 MG/ML
40 INJECTION INTRAMUSCULAR; INTRAVENOUS ONCE
Status: DISCONTINUED | OUTPATIENT
Start: 2018-04-06 | End: 2018-04-05

## 2018-04-05 RX ORDER — ATORVASTATIN CALCIUM 40 MG/1
40 TABLET, FILM COATED ORAL NIGHTLY
Status: DISCONTINUED | OUTPATIENT
Start: 2018-04-05 | End: 2018-04-08 | Stop reason: HOSPADM

## 2018-04-05 RX ORDER — BUDESONIDE 0.5 MG/2ML
0.5 INHALANT ORAL EVERY 12 HOURS
Status: DISCONTINUED | OUTPATIENT
Start: 2018-04-05 | End: 2018-04-08 | Stop reason: HOSPADM

## 2018-04-05 RX ORDER — IPRATROPIUM BROMIDE AND ALBUTEROL SULFATE 2.5; .5 MG/3ML; MG/3ML
3 SOLUTION RESPIRATORY (INHALATION) EVERY 4 HOURS PRN
Status: DISCONTINUED | OUTPATIENT
Start: 2018-04-06 | End: 2018-04-05

## 2018-04-05 RX ORDER — METHYLPREDNISOLONE SOD SUCC 125 MG
80 VIAL (EA) INJECTION ONCE
Status: COMPLETED | OUTPATIENT
Start: 2018-04-05 | End: 2018-04-06

## 2018-04-05 RX ORDER — SODIUM CHLORIDE 9 MG/ML
INJECTION, SOLUTION INTRAVENOUS CONTINUOUS
Status: ACTIVE | OUTPATIENT
Start: 2018-04-06 | End: 2018-04-06

## 2018-04-05 RX ORDER — IPRATROPIUM BROMIDE AND ALBUTEROL SULFATE 2.5; .5 MG/3ML; MG/3ML
3 SOLUTION RESPIRATORY (INHALATION) EVERY 6 HOURS PRN
Status: DISCONTINUED | OUTPATIENT
Start: 2018-04-06 | End: 2018-04-08 | Stop reason: HOSPADM

## 2018-04-05 RX ADMIN — ACETAMINOPHEN 650 MG: 325 TABLET ORAL at 11:04

## 2018-04-05 RX ADMIN — IPRATROPIUM BROMIDE AND ALBUTEROL SULFATE 3 ML: .5; 3 SOLUTION RESPIRATORY (INHALATION) at 11:04

## 2018-04-05 RX ADMIN — MORPHINE SULFATE 2 MG: 4 INJECTION INTRAVENOUS at 06:04

## 2018-04-05 RX ADMIN — ASPIRIN 325 MG: 325 TABLET, COATED ORAL at 09:04

## 2018-04-05 RX ADMIN — PANTOPRAZOLE SODIUM 40 MG: 40 TABLET, DELAYED RELEASE ORAL at 09:04

## 2018-04-05 RX ADMIN — NICOTINE 1 PATCH: 21 PATCH, EXTENDED RELEASE TRANSDERMAL at 09:04

## 2018-04-05 RX ADMIN — GUAIFENESIN 600 MG: 600 TABLET, EXTENDED RELEASE ORAL at 12:04

## 2018-04-05 RX ADMIN — AZITHROMYCIN MONOHYDRATE 500 MG: 500 INJECTION, POWDER, LYOPHILIZED, FOR SOLUTION INTRAVENOUS at 09:04

## 2018-04-05 RX ADMIN — GABAPENTIN 600 MG: 300 CAPSULE ORAL at 09:04

## 2018-04-05 RX ADMIN — IPRATROPIUM BROMIDE AND ALBUTEROL SULFATE 3 ML: .5; 3 SOLUTION RESPIRATORY (INHALATION) at 04:04

## 2018-04-05 RX ADMIN — DIPHENHYDRAMINE HYDROCHLORIDE 25 MG: 25 CAPSULE ORAL at 11:04

## 2018-04-05 RX ADMIN — IPRATROPIUM BROMIDE AND ALBUTEROL SULFATE 3 ML: .5; 3 SOLUTION RESPIRATORY (INHALATION) at 09:04

## 2018-04-05 RX ADMIN — DULOXETINE 60 MG: 30 CAPSULE, DELAYED RELEASE ORAL at 09:04

## 2018-04-05 RX ADMIN — IPRATROPIUM BROMIDE AND ALBUTEROL SULFATE 3 ML: .5; 3 SOLUTION RESPIRATORY (INHALATION) at 08:04

## 2018-04-05 RX ADMIN — CARVEDILOL 12.5 MG: 12.5 TABLET, FILM COATED ORAL at 09:04

## 2018-04-05 RX ADMIN — SODIUM CHLORIDE: 0.9 INJECTION, SOLUTION INTRAVENOUS at 01:04

## 2018-04-05 RX ADMIN — GUAIFENESIN 600 MG: 600 TABLET, EXTENDED RELEASE ORAL at 08:04

## 2018-04-05 RX ADMIN — MORPHINE SULFATE 2 MG: 4 INJECTION INTRAVENOUS at 09:04

## 2018-04-05 RX ADMIN — CARVEDILOL 12.5 MG: 12.5 TABLET, FILM COATED ORAL at 08:04

## 2018-04-05 RX ADMIN — POTASSIUM CHLORIDE 30 MEQ: 750 TABLET, EXTENDED RELEASE ORAL at 12:04

## 2018-04-05 RX ADMIN — IPRATROPIUM BROMIDE AND ALBUTEROL SULFATE 3 ML: .5; 3 SOLUTION RESPIRATORY (INHALATION) at 12:04

## 2018-04-05 RX ADMIN — ACETAMINOPHEN 650 MG: 325 TABLET ORAL at 03:04

## 2018-04-05 RX ADMIN — ATORVASTATIN CALCIUM 40 MG: 40 TABLET, FILM COATED ORAL at 08:04

## 2018-04-05 RX ADMIN — GUAIFENESIN 600 MG: 600 TABLET, EXTENDED RELEASE ORAL at 09:04

## 2018-04-05 RX ADMIN — SODIUM CHLORIDE: 0.9 INJECTION, SOLUTION INTRAVENOUS at 06:04

## 2018-04-05 RX ADMIN — CEFTRIAXONE SODIUM 1 G: 1 INJECTION, POWDER, FOR SOLUTION INTRAMUSCULAR; INTRAVENOUS at 08:04

## 2018-04-05 RX ADMIN — GABAPENTIN 600 MG: 300 CAPSULE ORAL at 08:04

## 2018-04-05 RX ADMIN — MORPHINE SULFATE 2 MG: 4 INJECTION INTRAVENOUS at 01:04

## 2018-04-05 NOTE — PROGRESS NOTES
Received secure chat pt BP 85/52. Pt currently asymptomatic. I obtained repeat BP of 91/52, . Increase NS to 125 ml/hr. Will continue to monitor

## 2018-04-05 NOTE — ASSESSMENT & PLAN NOTE
- CXR with moderate amount of alveolar consolidation scattered throughout the lower two thirds of the left lung. This is consistent with the patient's history and characteristic of pneumonia.  - Blood cultures obtained, results pending  - Continue IV Azithromycin and Rocephin  - Scheduled Duonebs  - Supplemental oxygen prn, keep O2 sats > 88% (hx of COPD)

## 2018-04-05 NOTE — ASSESSMENT & PLAN NOTE
- Family member to bring home medications, she is unsure of what she takes. Once reviewed, will continue appropriate medications  - Apresoline prn

## 2018-04-05 NOTE — SUBJECTIVE & OBJECTIVE
Interval History: The patient reports that she continues to have increased shortness of breath and coughing. WBC increased to 15K. Afebrile overnight. O2 sats improved with supplemental O2. The patient will likely need a home O2 eval prior to discharge.       Review of Systems   Constitutional: Positive for activity change, chills and fever. Negative for appetite change, diaphoresis, fatigue and unexpected weight change.   HENT: Negative.  Negative for congestion, drooling, facial swelling, rhinorrhea, sinus pressure, sneezing and sore throat.    Eyes: Negative.  Negative for discharge, redness, itching and visual disturbance.   Respiratory: Positive for cough (c/o dry cough) and shortness of breath. Negative for apnea, choking, chest tightness, wheezing and stridor.    Cardiovascular: Negative for chest pain, palpitations and leg swelling.   Gastrointestinal: Negative for abdominal distention, abdominal pain, anal bleeding, blood in stool, constipation, diarrhea, nausea and vomiting.   Endocrine: Negative.    Genitourinary: Negative.  Negative for decreased urine volume, difficulty urinating, dysuria, frequency, hematuria, pelvic pain, urgency, vaginal bleeding and vaginal discharge.   Musculoskeletal: Positive for arthralgias (c/o left hip pain). Negative for back pain, gait problem, joint swelling, myalgias, neck pain and neck stiffness.   Skin: Negative.  Negative for color change, pallor, rash and wound.   Allergic/Immunologic: Negative.    Neurological: Positive for weakness. Negative for dizziness, tremors, seizures, syncope, facial asymmetry, speech difficulty, light-headedness, numbness and headaches.   Hematological: Negative.    Psychiatric/Behavioral: Negative.  Negative for agitation, confusion, hallucinations and suicidal ideas.   All other systems reviewed and are negative.    Objective:     Vital Signs (Most Recent):  Temp: 98.4 °F (36.9 °C) (04/05/18 1204)  Pulse: 97 (04/05/18 1206)  Resp: 20  (04/05/18 1206)  BP: (!) 110/50 (04/05/18 1204)  SpO2: 95 % (04/05/18 1206) Vital Signs (24h Range):  Temp:  [97.7 °F (36.5 °C)-101.3 °F (38.5 °C)] 98.4 °F (36.9 °C)  Pulse:  [] 97  Resp:  [14-28] 20  SpO2:  [89 %-99 %] 95 %  BP: ()/(50-73) 110/50     Weight: 112 kg (246 lb 14.6 oz)  Body mass index is 42.38 kg/m².    Intake/Output Summary (Last 24 hours) at 04/05/18 1220  Last data filed at 04/05/18 0900   Gross per 24 hour   Intake             1280 ml   Output               25 ml   Net             1255 ml      Physical Exam   Constitutional: She is oriented to person, place, and time. She appears well-developed. She appears ill.   HENT:   Head: Normocephalic and atraumatic.   Eyes: EOM are normal.   Neck: Normal range of motion. Neck supple.   Cardiovascular: Intact distal pulses.  Tachycardia present.    No murmur heard.  Pulmonary/Chest: Tachypnea noted. She has wheezes in the right upper field, the right middle field, the right lower field, the left upper field and the left lower field.   Diffuse bilateral expiratory wheezing noted.    Abdominal: Soft. Bowel sounds are normal. She exhibits no distension. There is no tenderness. There is no rebound and no guarding.   Genitourinary:   Genitourinary Comments: Deferred   Musculoskeletal: Normal range of motion. She exhibits tenderness. She exhibits no edema.        Left hip: She exhibits tenderness.   Neurological: She is alert and oriented to person, place, and time. No sensory deficit.   Skin: Skin is warm and dry. Capillary refill takes less than 2 seconds.   Psychiatric: She has a normal mood and affect. Her behavior is normal. Judgment and thought content normal.   Nursing note and vitals reviewed.      Significant Labs: All pertinent labs within the past 24 hours have been reviewed.    Significant Imaging:   Imaging Results          CT Head Without Contrast (Final result)  Result time 04/04/18 23:38:00    Final result by CALIN Galvan Sr., MD  (04/04/18 23:38:00)                 Impression:      1. There has been interval development of encephalomalacic changes scattered throughout the right cerebral hemisphere. There is no evidence of an acute ischemic event.  2. There is no evidence of an acute ischemic event. There is no intracranial hemorrhage.   3. There is no calvarial fracture.  4. There is a small steven bullosa in the left middle nasal turbinate.         All CT scans at this facility use dose modulation, iterative reconstruction, and/or weight base dosing when appropriate to reduce radiation dose when appropriate to reduce radiation dose to as low as reasonably achievable.      Electronically signed by: CALIN GALVAN MD  Date:     04/04/18  Time:    23:38              Narrative:    CT of Head without IV contrast    History:     Headache status post fall    Technique: Standard brain CT protocol without IV contrast was performed.     Finding: Comparison is made to prior examination performed on 4/14/2015. There has been interval development of encephalomalacic changes scattered throughout the right cerebral hemisphere. There is no evidence of an acute ischemic event. There is no intracranial hemorrhage. There is no calvarial fracture. The visualized portion of the paranasal sinuses is clear. There is a small steven bullosa in the left middle nasal turbinate.                             X-Ray Hip 2 or 3 views Left (Final result)  Result time 04/04/18 21:31:48    Final result by CALIN Galvan Sr., MD (04/04/18 21:31:48)                 Impression:      1. No fracture or dislocation  2. There is a prominent amount of fecal material within the ascending portion of the colon.      Electronically signed by: CALIN GALVAN MD  Date:     04/04/18  Time:    21:31              Narrative:    2 view x-ray of the left hip    Clinical History:     Pain in the left hip status post fall    Findings:     There is no fracture. There is no dislocation. There is a  prominent amount of fecal material within the ascending portion of the colon.                             X-Ray Chest AP Portable (Final result)  Result time 04/04/18 19:10:25    Final result by CALIN Galvan Sr., MD (04/04/18 19:10:25)                 Impression:        1. There is a moderate amount of alveolar consolidation scattered throughout the lower two thirds of the left lung. This is consistent with the patient's history and characteristic of pneumonia.  2. There is mild cardiomegaly.      Electronically signed by: CALIN GALVAN MD  Date:     04/04/18  Time:    19:10              Narrative:    One-view chest x-ray    Clinical History: Sepsis    Finding: Comparison was made to a prior examination performed on 4/15/2015. The size of the heart is prominent. The lungs are clear. There is a moderate amount of alveolar consolidation scattered throughout the lower two thirds of the left lung. The right lung is clear. There is no pneumothorax. The costophrenic angles are sharp.

## 2018-04-05 NOTE — H&P
[facility] Hospital Medicine  History & Physical    Patient Name: Valorie Monzon  MRN: [id number]  Admission Date: 4/4/2018  Attending Physician: Santos Anthony Do, MD   Primary Care Provider: Karri Levy MD         Patient information was obtained from patient and ER records.     Subjective:     Principal Problem:Sepsis    Chief Complaint:   Chief Complaint   Patient presents with    Shortness of Breath     and chest pain with increased falling         HPI: Valorie Monzon is a 47 year old female with a PMHx of Tobacco abuse (quit 2 months ago), HTN, COPD, and CVA x 2 who presented to the Emergency Department with c/o SOB that started early this morning around 0200. Associated symptoms include: fever, chills, and falls. Pt reports she would get up to go to the bathroom, get SOB, and fall. Pt reports falling x 3 -- fell on left side (c/o left hip pain) and hit her head. Unknown LOC. Upon arrival to ED, pt tachycardiac 130 BPM, febrile 101.3, and hypoxic 89% room air. ED workup revealed: WBC 13.90, K+ 3.3, procalcitonin 0.73, influenza negative, ABG PO2 49. CXR with a moderate amount of alveolar consolidation scattered throughout the lower two thirds of the left lung. This is consistent with the patient's history and characteristic of pneumonia and is mild cardiomegaly. Pt admitted to [facility] for Sepsis secondary to CAP.     Past Medical History:   Diagnosis Date    COPD (chronic obstructive pulmonary disease)     Hypertension     Kidney stone     Stroke     Tobacco use        Past Surgical History:   Procedure Laterality Date    HYSTERECTOMY      KNEE ARTHROSCOPY W/ ACL RECONSTRUCTION Left     LITHOTRIPSY         Review of patient's allergies indicates:  No Known Allergies    No current facility-administered medications on file prior to encounter.      Current Outpatient Prescriptions on File Prior to Encounter   Medication Sig    aspirin (ECOTRIN) 325 MG EC tablet Take 325  mg by mouth once daily.    atorvastatin (LIPITOR) 40 MG tablet TAKE 1 TABLET BY MOUTH DAILY AVOID GRAPEFRUIT    baclofen (LIORESAL) 10 MG tablet Take 10 mg by mouth as needed.    carvedilol (COREG) 3.125 MG tablet Take 3.125 mg by mouth.    DULoxetine (CYMBALTA) 60 MG capsule Take 60 mg by mouth once daily.    furosemide (LASIX) 20 MG tablet Take 1 tablet (20 mg total) by mouth once daily.    gabapentin (NEURONTIN) 600 MG tablet Take 600 mg by mouth 2 (two) times daily.    hydrOXYzine HCl (ATARAX) 25 MG tablet TAKE 1 TABLET BY MOUTH THREE TIMES DAILY AS NEEDED FOR ANXIETY    LORazepam (ATIVAN) 1 MG tablet Take 0.5 mg by mouth.    nicotine (NICODERM CQ) 7 mg/24 hr     omeprazole (PRILOSEC) 20 MG capsule TAKE 1 CAPSULE BY MOUTH TWICE DAILY    VENTOLIN HFA 90 mcg/actuation inhaler      Family History     Problem Relation (Age of Onset)    Breast cancer Mother    Cancer Mother    Kidney disease Mother    Liver disease Mother        Social History Main Topics    Smoking status: Former Smoker     Packs/day: 1.00     Types: Cigarettes    Smokeless tobacco: Never Used      Comment: patches     Alcohol use No    Drug use: No    Sexual activity: Not Currently     Partners: Male     Review of Systems   Constitutional: Positive for activity change, chills and fever.   HENT: Negative.    Eyes: Negative.    Respiratory: Positive for cough (c/o dry cough) and shortness of breath. Negative for apnea, choking, chest tightness, wheezing and stridor.    Cardiovascular: Negative for chest pain, palpitations and leg swelling.   Gastrointestinal: Negative for abdominal pain, constipation, diarrhea, nausea and vomiting.   Endocrine: Negative.    Genitourinary: Negative.    Musculoskeletal: Positive for arthralgias (c/o left hip pain). Negative for back pain, gait problem, joint swelling, myalgias, neck pain and neck stiffness.   Skin: Negative.    Allergic/Immunologic: Negative.    Neurological: Positive for weakness.  Negative for dizziness, tremors, seizures, syncope, facial asymmetry, speech difficulty, light-headedness, numbness and headaches.   Hematological: Negative.    Psychiatric/Behavioral: Negative.      Objective:     Vital Signs (Most Recent):  Temp: 99.9 °F (37.7 °C) (04/04/18 1906)  Pulse: (!) 119 (04/04/18 1845)  Resp: 14 (04/04/18 1845)  BP: 132/73 (04/04/18 1753)  SpO2: 99 % (04/04/18 1845) Vital Signs (24h Range):  Temp:  [99.9 °F (37.7 °C)-101.3 °F (38.5 °C)] 99.9 °F (37.7 °C)  Pulse:  [119-130] 119  Resp:  [14-20] 14  SpO2:  [89 %-99 %] 99 %  BP: (132)/(73) 132/73     Weight: 102.1 kg (225 lb)  Body mass index is 38.62 kg/m².    Physical Exam   Constitutional: She is oriented to person, place, and time. She appears well-developed. She appears ill.   HENT:   Head: Normocephalic and atraumatic.   Eyes: EOM are normal.   Neck: Normal range of motion. Neck supple.   Cardiovascular: Intact distal pulses.  Tachycardia present.    No murmur heard.  Pulmonary/Chest: Tachypnea noted. She has wheezes in the right upper field, the right middle field, the right lower field, the left upper field and the left lower field.   Abdominal: Soft. Bowel sounds are normal. She exhibits no distension. There is no tenderness. There is no rebound and no guarding.   Genitourinary:   Genitourinary Comments: Deferred   Musculoskeletal: Normal range of motion. She exhibits tenderness. She exhibits no edema.        Left hip: She exhibits tenderness.   Neurological: She is alert and oriented to person, place, and time. No sensory deficit.   Skin: Skin is warm and dry. Capillary refill takes less than 2 seconds.   Psychiatric: She has a normal mood and affect. Her behavior is normal. Judgment and thought content normal.   Nursing note and vitals reviewed.        CRANIAL NERVES     CN III, IV, VI   Extraocular motions are normal.        Significant Labs:   ABGs:   Recent Labs  Lab 04/04/18  1832   PH 7.495*   PCO2 28.2*   HCO3 21.7*    POCSATURATED 88*   BE -2     CBC:   Recent Labs  Lab 04/04/18 1845   WBC 13.90*   HGB 12.9   HCT 40.2        CMP:   Recent Labs  Lab 04/04/18 1845   *   K 3.3*      CO2 23   GLU 94   BUN 12   CREATININE 0.8   CALCIUM 9.4   PROT 7.3   ALBUMIN 3.6   BILITOT 0.2   ALKPHOS 85   AST 25   ALT 15   ANIONGAP 12   EGFRNONAA >60     Lactic Acid:   Recent Labs  Lab 04/04/18 1845   LACTATE 0.9     Urine Studies:   Recent Labs  Lab 04/04/18 1845   COLORU Yellow   APPEARANCEUA Clear   PHUR 6.0   SPECGRAV 1.015   PROTEINUA Negative   GLUCUA Negative   KETONESU Negative   BILIRUBINUA Negative   OCCULTUA Trace*   NITRITE Negative   UROBILINOGEN Negative   LEUKOCYTESUR Negative       Significant Imaging:   Imaging Results          X-Ray Chest AP Portable (Final result)  Result time 04/04/18 19:10:25    Final result by CALIN Galvan Sr., MD (04/04/18 19:10:25)                 Impression:        1. There is a moderate amount of alveolar consolidation scattered throughout the lower two thirds of the left lung. This is consistent with the patient's history and characteristic of pneumonia.  2. There is mild cardiomegaly.      Electronically signed by: CALIN GALVAN MD  Date:     04/04/18  Time:    19:10              Narrative:    One-view chest x-ray    Clinical History: Sepsis    Finding: Comparison was made to a prior examination performed on 4/15/2015. The size of the heart is prominent. The lungs are clear. There is a moderate amount of alveolar consolidation scattered throughout the lower two thirds of the left lung. The right lung is clear. There is no pneumothorax. The costophrenic angles are sharp.                            Assessment/Plan:     * Sepsis    - Admit to [facility]  - Sepsis criteria:  WBC 13.90, Temp 101.3, , and suspected infection pneumonia  - Influenza negative  - Blood/urine cultures obtained, results pending -- follow  - Pt received 2L NS boluses in ED  - Continue IV Rocephin and  Azithromycin          Acute respiratory failure with hypoxia    - Likely secondary to pneumonia   - Continue supportive therapy with -- IV antibiotics, supplemental oxygen, and inhaled medications          Pneumonia of left lower lobe due to infectious organism    - CXR with moderate amount of alveolar consolidation scattered throughout the lower two thirds of the left lung. This is consistent with the patient's history and characteristic of pneumonia.  - Blood cultures obtained, results pending  - Continue IV Azithromycin and Rocephin  - Scheduled Duonebs  - Supplemental oxygen prn, keep O2 sats > 88% (hx of COPD)          Hypokalemia    - K+ 3.3  - Given KCL 30 mEq PO x 1  - Repeat BMP in AM          Fall    - Unknown LOC  - Pt c/o headache earlier which is now resolved and currently c/o left hip pain  - Obtain CT head and left hip xray s/p fall prior to arrival          COPD (chronic obstructive pulmonary disease)    - Duonebs  - Supplemental oxygen prn, keep O2 sats > 88%  - Family member to bring home medications, she is unsure of what she takes. Once reviewed, will continue appropriate medications          Tobacco abuse    - Pt reports quitting 2 months ago  - Continue nicotine patch          HTN (hypertension)    - Family member to bring home medications, she is unsure of what she takes. Once reviewed, will continue appropriate medications  - Apresoline prn            VTE Risk Mitigation         Ordered     Place sequential compression device  Until discontinued      04/04/18 2117     Place sequential compression device  Until discontinued      04/04/18 2029     IP VTE HIGH RISK PATIENT  Once      04/04/18 2029             RON Gaitan  Department of Hospital Medicine   [facility]

## 2018-04-05 NOTE — ASSESSMENT & PLAN NOTE
- Robert  - Supplemental oxygen prn, keep O2 sats > 88%  - Family member to bring home medications, she is unsure of what she takes. Once reviewed, will continue appropriate medications

## 2018-04-05 NOTE — ED NOTES
Pt sts she had breast biopsy a few days ago. SOB since yesterday has had dry congested cough for 3 days. Sts she fell yesterday and hit head but no LOC. Denies NVD

## 2018-04-05 NOTE — ASSESSMENT & PLAN NOTE
- CXR with moderate amount of alveolar consolidation scattered throughout the lower two thirds of the left lung. This is consistent with the patient's history and characteristic of pneumonia.  - Blood cultures obtained, results pending  - Continue IV Azithromycin and Rocephin  - Scheduled Duonebs  - Supplemental oxygen prn, keep O2 sats > 88% (hx of COPD)  - Mucinex

## 2018-04-05 NOTE — ASSESSMENT & PLAN NOTE
- Admit to [facility]  - Sepsis criteria:  WBC 13.90, Temp 101.3, , and suspected infection pneumonia  - Influenza negative  - Blood/urine cultures obtained, results pending -- follow  - Pt received 2L NS boluses in ED  - Continue IV Rocephin and Azithromycin

## 2018-04-05 NOTE — ASSESSMENT & PLAN NOTE
- Admit to [facility]  - Sepsis criteria:  WBC 13.90, Temp 101.3, , and suspected infection pneumonia  - Influenza negative  - Blood/urine cultures obtained, results pending -- follow  - Pt received 2L NS boluses in ED  - Continue IV Rocephin and Azithromycin

## 2018-04-05 NOTE — HPI
Valorie Monzon is a 47 year old female with a PMHx of Tobacco abuse (quit 2 months ago), HTN, COPD, and CVA x 2 who presented to the Emergency Department with c/o SOB that started early this morning around 0200. Associated symptoms include: fever, chills, and falls. Pt reports she would get up to go to the bathroom, get SOB, and fall. Pt reports falling x 3 -- fell on left side (c/o left hip pain) and hit her head. Unknown LOC. Upon arrival to ED, pt tachycardiac 130 BPM, febrile 101.3, and hypoxic 89% room air. ED workup revealed: WBC 13.90, K+ 3.3, procalcitonin 0.73, influenza negative, ABG PO2 49. CXR with a moderate amount of alveolar consolidation scattered throughout the lower two thirds of the left lung. This is consistent with the patient's history and characteristic of pneumonia and is mild cardiomegaly. Pt admitted to [facility] for Sepsis secondary to CAP.

## 2018-04-05 NOTE — ASSESSMENT & PLAN NOTE
- Likely secondary to pneumonia   - Continue supportive therapy with -- IV antibiotics, supplemental oxygen, and inhaled medications  - Consider O2 eval prior to discharge

## 2018-04-05 NOTE — SUBJECTIVE & OBJECTIVE
Past Medical History:   Diagnosis Date    COPD (chronic obstructive pulmonary disease)     Hypertension     Kidney stone     Stroke     Tobacco use        Past Surgical History:   Procedure Laterality Date    HYSTERECTOMY      KNEE ARTHROSCOPY W/ ACL RECONSTRUCTION Left     LITHOTRIPSY         Review of patient's allergies indicates:  No Known Allergies    No current facility-administered medications on file prior to encounter.      Current Outpatient Prescriptions on File Prior to Encounter   Medication Sig    aspirin (ECOTRIN) 325 MG EC tablet Take 325 mg by mouth once daily.    atorvastatin (LIPITOR) 40 MG tablet TAKE 1 TABLET BY MOUTH DAILY AVOID GRAPEFRUIT    baclofen (LIORESAL) 10 MG tablet Take 10 mg by mouth as needed.    carvedilol (COREG) 3.125 MG tablet Take 3.125 mg by mouth.    DULoxetine (CYMBALTA) 60 MG capsule Take 60 mg by mouth once daily.    furosemide (LASIX) 20 MG tablet Take 1 tablet (20 mg total) by mouth once daily.    gabapentin (NEURONTIN) 600 MG tablet Take 600 mg by mouth 2 (two) times daily.    hydrOXYzine HCl (ATARAX) 25 MG tablet TAKE 1 TABLET BY MOUTH THREE TIMES DAILY AS NEEDED FOR ANXIETY    LORazepam (ATIVAN) 1 MG tablet Take 0.5 mg by mouth.    nicotine (NICODERM CQ) 7 mg/24 hr     omeprazole (PRILOSEC) 20 MG capsule TAKE 1 CAPSULE BY MOUTH TWICE DAILY    VENTOLIN HFA 90 mcg/actuation inhaler      Family History     Problem Relation (Age of Onset)    Breast cancer Mother    Cancer Mother    Kidney disease Mother    Liver disease Mother        Social History Main Topics    Smoking status: Former Smoker     Packs/day: 1.00     Types: Cigarettes    Smokeless tobacco: Never Used      Comment: patches     Alcohol use No    Drug use: No    Sexual activity: Not Currently     Partners: Male     Review of Systems   Constitutional: Positive for activity change, chills and fever.   HENT: Negative.    Eyes: Negative.    Respiratory: Positive for cough (c/o dry cough)  and shortness of breath. Negative for apnea, choking, chest tightness, wheezing and stridor.    Cardiovascular: Negative for chest pain, palpitations and leg swelling.   Gastrointestinal: Negative for abdominal pain, constipation, diarrhea, nausea and vomiting.   Endocrine: Negative.    Genitourinary: Negative.    Musculoskeletal: Positive for arthralgias (c/o left hip pain). Negative for back pain, gait problem, joint swelling, myalgias, neck pain and neck stiffness.   Skin: Negative.    Allergic/Immunologic: Negative.    Neurological: Positive for weakness. Negative for dizziness, tremors, seizures, syncope, facial asymmetry, speech difficulty, light-headedness, numbness and headaches.   Hematological: Negative.    Psychiatric/Behavioral: Negative.      Objective:     Vital Signs (Most Recent):  Temp: 99.9 °F (37.7 °C) (04/04/18 1906)  Pulse: (!) 119 (04/04/18 1845)  Resp: 14 (04/04/18 1845)  BP: 132/73 (04/04/18 1753)  SpO2: 99 % (04/04/18 1845) Vital Signs (24h Range):  Temp:  [99.9 °F (37.7 °C)-101.3 °F (38.5 °C)] 99.9 °F (37.7 °C)  Pulse:  [119-130] 119  Resp:  [14-20] 14  SpO2:  [89 %-99 %] 99 %  BP: (132)/(73) 132/73     Weight: 102.1 kg (225 lb)  Body mass index is 38.62 kg/m².    Physical Exam   Constitutional: She is oriented to person, place, and time. She appears well-developed. She appears ill.   HENT:   Head: Normocephalic and atraumatic.   Eyes: EOM are normal.   Neck: Normal range of motion. Neck supple.   Cardiovascular: Intact distal pulses.  Tachycardia present.    No murmur heard.  Pulmonary/Chest: Tachypnea noted. She has wheezes in the right upper field, the right middle field, the right lower field, the left upper field and the left lower field.   Abdominal: Soft. Bowel sounds are normal. She exhibits no distension. There is no tenderness. There is no rebound and no guarding.   Genitourinary:   Genitourinary Comments: Deferred   Musculoskeletal: Normal range of motion. She exhibits  tenderness. She exhibits no edema.        Left hip: She exhibits tenderness.   Neurological: She is alert and oriented to person, place, and time. No sensory deficit.   Skin: Skin is warm and dry. Capillary refill takes less than 2 seconds.   Psychiatric: She has a normal mood and affect. Her behavior is normal. Judgment and thought content normal.   Nursing note and vitals reviewed.        CRANIAL NERVES     CN III, IV, VI   Extraocular motions are normal.        Significant Labs:   ABGs:   Recent Labs  Lab 04/04/18  1832   PH 7.495*   PCO2 28.2*   HCO3 21.7*   POCSATURATED 88*   BE -2     CBC:   Recent Labs  Lab 04/04/18 1845   WBC 13.90*   HGB 12.9   HCT 40.2        CMP:   Recent Labs  Lab 04/04/18 1845   *   K 3.3*      CO2 23   GLU 94   BUN 12   CREATININE 0.8   CALCIUM 9.4   PROT 7.3   ALBUMIN 3.6   BILITOT 0.2   ALKPHOS 85   AST 25   ALT 15   ANIONGAP 12   EGFRNONAA >60     Lactic Acid:   Recent Labs  Lab 04/04/18  1845   LACTATE 0.9     Urine Studies:   Recent Labs  Lab 04/04/18  1845   COLORU Yellow   APPEARANCEUA Clear   PHUR 6.0   SPECGRAV 1.015   PROTEINUA Negative   GLUCUA Negative   KETONESU Negative   BILIRUBINUA Negative   OCCULTUA Trace*   NITRITE Negative   UROBILINOGEN Negative   LEUKOCYTESUR Negative       Significant Imaging:   Imaging Results          X-Ray Chest AP Portable (Final result)  Result time 04/04/18 19:10:25    Final result by CALIN Galvan Sr., MD (04/04/18 19:10:25)                 Impression:        1. There is a moderate amount of alveolar consolidation scattered throughout the lower two thirds of the left lung. This is consistent with the patient's history and characteristic of pneumonia.  2. There is mild cardiomegaly.      Electronically signed by: CALIN GALVAN MD  Date:     04/04/18  Time:    19:10              Narrative:    One-view chest x-ray    Clinical History: Sepsis    Finding: Comparison was made to a prior examination performed on 4/15/2015.  The size of the heart is prominent. The lungs are clear. There is a moderate amount of alveolar consolidation scattered throughout the lower two thirds of the left lung. The right lung is clear. There is no pneumothorax. The costophrenic angles are sharp.

## 2018-04-05 NOTE — PROGRESS NOTES
Representative in room teaching pt how to use and apply Life Vest. Pt called spouse and she listened to presentation as well. Pt verbalized understanding and teach back. Life Vest in place.

## 2018-04-05 NOTE — ASSESSMENT & PLAN NOTE
- Unknown LOC  - Pt c/o headache earlier which is now resolved and currently c/o left hip pain  - Obtain CT head and left hip xray s/p fall prior to arrival

## 2018-04-05 NOTE — PLAN OF CARE
Met with patient and her son, Armaan Bolton. Patient has needed family assistance since she had a stroke. She reported that she had therapy at [place] after the stroke. She said that she has recovered her mobility and has shown improvement in her speech and processing thoughts.   Patient stated that her PCP is Dr. Mejia in the same office as Dr. Karri Levy. She has [employer] [place], Medicaid [place] [employer].   Provided Discharge Planning Begins upon Admission, Discharge Planning Packet including Advance Directive Admission, [facility] Delivery information and contact information for . Explained role of case management in the transition of care. Provided contact information for Alvaro Mendoza LCSW, .      04/05/18 9342   Discharge Assessment   Assessment Type Discharge Planning Assessment   Confirmed/corrected address and phone number on facesheet? Yes   Assessment information obtained from? Patient;Medical Record   Expected Length of Stay (days) (TBD)   Prior to hospitilization cognitive status: Alert/Oriented   Prior to hospitalization functional status: Needs Assistance   Current cognitive status: Alert/Oriented   Current Functional Status: Needs Assistance   Facility Arrived From: (home)   Lives With child(isatu), adult   Able to Return to Prior Arrangements yes   Is patient able to care for self after discharge? No   Who are your caregiver(s) and their phone number(s)? (Daughter in law was identified as her caregiver after her stroke)   Patient's perception of discharge disposition home or selfcare   Readmission Within The Last 30 Days no previous admission in last 30 days   Patient currently being followed by outpatient case management? No   Equipment Currently Used at Home none   Do you have any problems affording any of your prescribed medications? No   Is the patient taking medications as prescribed? yes   Does the patient have transportation  home? Yes   Transportation Available family or friend will provide   Discharge Plan A Home with family   Patient/Family In Agreement With Plan yes   Patient stated that her son and daughter have POA. She will have family bring documents to the hospital to scan into the medical record.

## 2018-04-05 NOTE — PLAN OF CARE
Problem: Patient Care Overview  Goal: Plan of Care Review  Outcome: Ongoing (interventions implemented as appropriate)  Plan of care reviewed and discussed with patient.  No acute distress noted.  Safety and fall precautions reviewed and discussed with patient.  Patient free from injury.  Pain managed adequately.  IV hydration maintained.  Ambulation promoted.  Will continue to monitor.      12 hour chart check complete.

## 2018-04-05 NOTE — HOSPITAL COURSE
4/5/18 The patient reports that she continues to have increased shortness of breath and coughing. WBC increased to 15K. Afebrile overnight. O2 sats improved with supplemental O2. The patient will likely need a home O2 eval prior to discharge. 4/6/18: Patient remains SOB at rest. On oxygen @ 3l/min via [place]. She had episode of excessive coughing last night - CXR showed Mild bilateral congestion.  IV steroids ordered X 1 - will continue BID.  IS ordered. Will continue IV abx and breathing tx. Will likely need home oxygen upon dc. 4/7: Patient reports she is still extremely SOB with any activity or taking oxygen off. She continues to have a cough that is not controlled with cough medication. CTA ordered. Consulted Pulm. Disease management. Will continue abx. Pt will be assessed for home oxygen before dc.  4/8: Patient did not qualify for home oxygen and is breathing much better today. Oxygen sat at 96% on Ra. WBC WNL. Patient will establish with pulmonology and will take PO abx, PO steroids to taper down, and start breo elipta daily (she took previously). Patient was seen and examined today and deemed stable for discharge home.

## 2018-04-05 NOTE — ASSESSMENT & PLAN NOTE
- Likely secondary to pneumonia   - Continue supportive therapy with -- IV antibiotics, supplemental oxygen, and inhaled medications

## 2018-04-05 NOTE — PLAN OF CARE
Problem: Patient Care Overview  Goal: Plan of Care Review  Outcome: Ongoing (interventions implemented as appropriate)  Reviewed POC, including indications and possible side effects of administered medications. Pt verbalized understanding and teach back.     12 hour chart check complete.

## 2018-04-06 PROBLEM — E66.01 MORBID OBESITY: Status: ACTIVE | Noted: 2018-04-06

## 2018-04-06 LAB
ALBUMIN SERPL BCP-MCNC: 3.1 G/DL
ALP SERPL-CCNC: 85 U/L
ALT SERPL W/O P-5'-P-CCNC: 13 U/L
ANION GAP SERPL CALC-SCNC: 9 MMOL/L
AST SERPL-CCNC: 18 U/L
BACTERIA UR CULT: NO GROWTH
BASOPHILS # BLD AUTO: 0.01 K/UL
BASOPHILS NFR BLD: 0.1 %
BILIRUB SERPL-MCNC: 0.3 MG/DL
BNP SERPL-MCNC: 208 PG/ML
BUN SERPL-MCNC: 10 MG/DL
CALCIUM SERPL-MCNC: 8.6 MG/DL
CHLORIDE SERPL-SCNC: 107 MMOL/L
CO2 SERPL-SCNC: 21 MMOL/L
CREAT SERPL-MCNC: 0.7 MG/DL
DIASTOLIC DYSFUNCTION: NO
DIFFERENTIAL METHOD: ABNORMAL
EOSINOPHIL # BLD AUTO: 0 K/UL
EOSINOPHIL NFR BLD: 0.2 %
ERYTHROCYTE [DISTWIDTH] IN BLOOD BY AUTOMATED COUNT: 13.7 %
EST. GFR  (AFRICAN AMERICAN): >60 ML/MIN/1.73 M^2
EST. GFR  (NON AFRICAN AMERICAN): >60 ML/MIN/1.73 M^2
GLUCOSE SERPL-MCNC: 148 MG/DL
HCT VFR BLD AUTO: 35.3 %
HGB BLD-MCNC: 11.2 G/DL
LYMPHOCYTES # BLD AUTO: 0.3 K/UL
LYMPHOCYTES NFR BLD: 2.5 %
MCH RBC QN AUTO: 29.7 PG
MCHC RBC AUTO-ENTMCNC: 31.7 G/DL
MCV RBC AUTO: 94 FL
MITRAL VALVE REGURGITATION: NORMAL
MONOCYTES # BLD AUTO: 0.2 K/UL
MONOCYTES NFR BLD: 1.5 %
NEUTROPHILS # BLD AUTO: 12.5 K/UL
NEUTROPHILS NFR BLD: 95.7 %
PLATELET # BLD AUTO: 233 K/UL
PMV BLD AUTO: 10.6 FL
POTASSIUM SERPL-SCNC: 3.5 MMOL/L
PROT SERPL-MCNC: 6.9 G/DL
RBC # BLD AUTO: 3.77 M/UL
RETIRED EF AND QEF - SEE NOTES: 55 (ref 55–65)
SODIUM SERPL-SCNC: 137 MMOL/L
TROPONIN I SERPL DL<=0.01 NG/ML-MCNC: 0.01 NG/ML
TROPONIN I SERPL DL<=0.01 NG/ML-MCNC: <0.006 NG/ML
TROPONIN I SERPL DL<=0.01 NG/ML-MCNC: <0.006 NG/ML
WBC # BLD AUTO: 13.09 K/UL

## 2018-04-06 PROCEDURE — S4991 NICOTINE PATCH NONLEGEND: HCPCS | Performed by: NURSE PRACTITIONER

## 2018-04-06 PROCEDURE — 25000003 PHARM REV CODE 250: Performed by: NURSE PRACTITIONER

## 2018-04-06 PROCEDURE — 84484 ASSAY OF TROPONIN QUANT: CPT | Mod: 91

## 2018-04-06 PROCEDURE — 96372 THER/PROPH/DIAG INJ SC/IM: CPT

## 2018-04-06 PROCEDURE — 36415 COLL VENOUS BLD VENIPUNCTURE: CPT

## 2018-04-06 PROCEDURE — 63600175 PHARM REV CODE 636 W HCPCS: Performed by: NURSE PRACTITIONER

## 2018-04-06 PROCEDURE — 83880 ASSAY OF NATRIURETIC PEPTIDE: CPT

## 2018-04-06 PROCEDURE — 63600175 PHARM REV CODE 636 W HCPCS: Performed by: INTERNAL MEDICINE

## 2018-04-06 PROCEDURE — 27000221 HC OXYGEN, UP TO 24 HOURS

## 2018-04-06 PROCEDURE — 80053 COMPREHEN METABOLIC PANEL: CPT

## 2018-04-06 PROCEDURE — 93306 TTE W/DOPPLER COMPLETE: CPT | Mod: 26,,, | Performed by: INTERNAL MEDICINE

## 2018-04-06 PROCEDURE — 25000003 PHARM REV CODE 250: Performed by: FAMILY MEDICINE

## 2018-04-06 PROCEDURE — 21400001 HC TELEMETRY ROOM

## 2018-04-06 PROCEDURE — 25000242 PHARM REV CODE 250 ALT 637 W/ HCPCS: Performed by: NURSE PRACTITIONER

## 2018-04-06 PROCEDURE — 85025 COMPLETE CBC W/AUTO DIFF WBC: CPT

## 2018-04-06 PROCEDURE — 94761 N-INVAS EAR/PLS OXIMETRY MLT: CPT

## 2018-04-06 PROCEDURE — 93306 TTE W/DOPPLER COMPLETE: CPT

## 2018-04-06 PROCEDURE — 94640 AIRWAY INHALATION TREATMENT: CPT

## 2018-04-06 PROCEDURE — 94799 UNLISTED PULMONARY SVC/PX: CPT

## 2018-04-06 PROCEDURE — 97802 MEDICAL NUTRITION INDIV IN: CPT

## 2018-04-06 PROCEDURE — 25000003 PHARM REV CODE 250: Performed by: INTERNAL MEDICINE

## 2018-04-06 RX ORDER — POTASSIUM CHLORIDE 20 MEQ/1
40 TABLET, EXTENDED RELEASE ORAL ONCE
Status: COMPLETED | OUTPATIENT
Start: 2018-04-06 | End: 2018-04-06

## 2018-04-06 RX ORDER — PROMETHAZINE HYDROCHLORIDE AND CODEINE PHOSPHATE 6.25; 1 MG/5ML; MG/5ML
5 SOLUTION ORAL EVERY 4 HOURS PRN
Status: DISCONTINUED | OUTPATIENT
Start: 2018-04-06 | End: 2018-04-08 | Stop reason: HOSPADM

## 2018-04-06 RX ORDER — ENOXAPARIN SODIUM 100 MG/ML
40 INJECTION SUBCUTANEOUS EVERY 24 HOURS
Status: DISCONTINUED | OUTPATIENT
Start: 2018-04-06 | End: 2018-04-08 | Stop reason: HOSPADM

## 2018-04-06 RX ADMIN — GUAIFENESIN 600 MG: 600 TABLET, EXTENDED RELEASE ORAL at 09:04

## 2018-04-06 RX ADMIN — SODIUM CHLORIDE: 0.9 INJECTION, SOLUTION INTRAVENOUS at 12:04

## 2018-04-06 RX ADMIN — IPRATROPIUM BROMIDE AND ALBUTEROL SULFATE 3 ML: .5; 3 SOLUTION RESPIRATORY (INHALATION) at 04:04

## 2018-04-06 RX ADMIN — GABAPENTIN 600 MG: 300 CAPSULE ORAL at 08:04

## 2018-04-06 RX ADMIN — CARVEDILOL 12.5 MG: 12.5 TABLET, FILM COATED ORAL at 08:04

## 2018-04-06 RX ADMIN — ASPIRIN 325 MG: 325 TABLET, COATED ORAL at 08:04

## 2018-04-06 RX ADMIN — IPRATROPIUM BROMIDE AND ALBUTEROL SULFATE 3 ML: .5; 3 SOLUTION RESPIRATORY (INHALATION) at 03:04

## 2018-04-06 RX ADMIN — PROMETHAZINE HYDROCHLORIDE AND CODEINE PHOSPHATE 5 ML: 6.25; 1 SYRUP ORAL at 09:04

## 2018-04-06 RX ADMIN — IPRATROPIUM BROMIDE AND ALBUTEROL SULFATE 3 ML: .5; 3 SOLUTION RESPIRATORY (INHALATION) at 09:04

## 2018-04-06 RX ADMIN — BUDESONIDE 0.5 MG: 0.5 SUSPENSION RESPIRATORY (INHALATION) at 03:04

## 2018-04-06 RX ADMIN — METHYLPREDNISOLONE SODIUM SUCCINATE 80 MG: 125 INJECTION, POWDER, FOR SOLUTION INTRAMUSCULAR; INTRAVENOUS at 12:04

## 2018-04-06 RX ADMIN — ATORVASTATIN CALCIUM 40 MG: 40 TABLET, FILM COATED ORAL at 09:04

## 2018-04-06 RX ADMIN — SODIUM CHLORIDE: 0.9 INJECTION, SOLUTION INTRAVENOUS at 02:04

## 2018-04-06 RX ADMIN — AZITHROMYCIN MONOHYDRATE 500 MG: 500 INJECTION, POWDER, LYOPHILIZED, FOR SOLUTION INTRAVENOUS at 09:04

## 2018-04-06 RX ADMIN — IPRATROPIUM BROMIDE AND ALBUTEROL SULFATE 3 ML: .5; 3 SOLUTION RESPIRATORY (INHALATION) at 08:04

## 2018-04-06 RX ADMIN — PROMETHAZINE HYDROCHLORIDE AND CODEINE PHOSPHATE 5 ML: 6.25; 1 SYRUP ORAL at 05:04

## 2018-04-06 RX ADMIN — HYDROCODONE BITARTRATE AND ACETAMINOPHEN 1 TABLET: 10; 325 TABLET ORAL at 10:04

## 2018-04-06 RX ADMIN — CEFTRIAXONE SODIUM 1 G: 1 INJECTION, POWDER, FOR SOLUTION INTRAMUSCULAR; INTRAVENOUS at 08:04

## 2018-04-06 RX ADMIN — IPRATROPIUM BROMIDE AND ALBUTEROL SULFATE 3 ML: .5; 3 SOLUTION RESPIRATORY (INHALATION) at 11:04

## 2018-04-06 RX ADMIN — METHYLPREDNISOLONE SODIUM SUCCINATE 80 MG: 40 INJECTION, POWDER, FOR SOLUTION INTRAMUSCULAR; INTRAVENOUS at 09:04

## 2018-04-06 RX ADMIN — GUAIFENESIN 600 MG: 600 TABLET, EXTENDED RELEASE ORAL at 08:04

## 2018-04-06 RX ADMIN — ENOXAPARIN SODIUM 40 MG: 100 INJECTION SUBCUTANEOUS at 04:04

## 2018-04-06 RX ADMIN — HYDROCODONE BITARTRATE AND ACETAMINOPHEN 1 TABLET: 10; 325 TABLET ORAL at 07:04

## 2018-04-06 RX ADMIN — METHYLPREDNISOLONE SODIUM SUCCINATE 80 MG: 40 INJECTION, POWDER, FOR SOLUTION INTRAMUSCULAR; INTRAVENOUS at 02:04

## 2018-04-06 RX ADMIN — DULOXETINE 60 MG: 30 CAPSULE, DELAYED RELEASE ORAL at 08:04

## 2018-04-06 RX ADMIN — PROMETHAZINE HYDROCHLORIDE AND CODEINE PHOSPHATE 5 ML: 6.25; 1 SYRUP ORAL at 12:04

## 2018-04-06 RX ADMIN — BUDESONIDE 0.5 MG: 0.5 SUSPENSION RESPIRATORY (INHALATION) at 08:04

## 2018-04-06 RX ADMIN — HYDROCODONE BITARTRATE AND ACETAMINOPHEN 1 TABLET: 10; 325 TABLET ORAL at 03:04

## 2018-04-06 RX ADMIN — PANTOPRAZOLE SODIUM 40 MG: 40 TABLET, DELAYED RELEASE ORAL at 08:04

## 2018-04-06 RX ADMIN — POTASSIUM CHLORIDE 40 MEQ: 1500 TABLET, EXTENDED RELEASE ORAL at 02:04

## 2018-04-06 RX ADMIN — NICOTINE 1 PATCH: 21 PATCH, EXTENDED RELEASE TRANSDERMAL at 08:04

## 2018-04-06 RX ADMIN — IPRATROPIUM BROMIDE AND ALBUTEROL SULFATE 3 ML: .5; 3 SOLUTION RESPIRATORY (INHALATION) at 01:04

## 2018-04-06 RX ADMIN — BUDESONIDE 0.5 MG: 0.5 SUSPENSION RESPIRATORY (INHALATION) at 09:04

## 2018-04-06 NOTE — ASSESSMENT & PLAN NOTE
- CXR with moderate amount of alveolar consolidation scattered throughout the lower two thirds of the left lung. This is consistent with the patient's history and characteristic of pneumonia.  - Blood cultures obtained, results pending  - Continue IV Azithromycin and Rocephin  - Scheduled Duonebs  - Supplemental oxygen prn, keep O2 sats > 88% (hx of COPD)  - Mucinex    4/6:  --continue IV abx  --BC with NGTD  --scheduled duonebs and budesonide via nebulizer  --IV steroids added  --continue supplemental oxygen  --added promethazine cough syrup

## 2018-04-06 NOTE — ASSESSMENT & PLAN NOTE
- Resolved   - K+ 3.9  - Given KCL 30 mEq PO x 1  - Repeat BMP in AM    4/6:  --K+ 3.5  --Kcl 40meq PO X1  --monitor and replete as needed

## 2018-04-06 NOTE — CONSULTS
"  [facility] -   Adult Nutrition  Consult Note    SUMMARY     Recommendations    Recommendation/Intervention: 1. Continue current diet. 2. If PO intake is consistently <50%, add boost plus BID. 3. Patient was educated on weight management and Pt was given a handout from the nutrition care manual on Weight loss Tips Pt verbalized understanding and the importance of diet compliance. Pt was provided contact information for further questions. 4. Will continue to monitor.   Goals: PO intake > 75 % by next RD visit  Nutrition Goal Status: new  Communication of RD Recs:  (POC, sticky note)    Reason for Assessment    Reason for Assessment: consult  Dx:  1. Pneumonia of left lower lobe due to infectious organism    2. Shortness of breath    3. Acute respiratory failure with hypoxia    4. SOB (shortness of breath)    5. Cardiomegaly      Past Medical History:   Diagnosis Date    COPD (chronic obstructive pulmonary disease)     Hypertension     Kidney stone     Stroke     Tobacco use        General Information Comments: Patient reports fair appetite, PO intake ~ 50 % today. Patient was educated on weight management.   Nutrition Discharge Planning: LowNa diet    Nutrition Risk Screen    Nutrition Risk Screen: no indicators present    Nutrition/Diet History    Do you have any cultural, spiritual, Methodist conflicts, given your current situation?: No    Anthropometrics    Temp: 98.9 °F (37.2 °C)  Height Method: Stated  Height: 5' 4" (162.6 cm)  Height (inches): 64 in  Weight Method: Bed Scale  Weight: 112 kg (246 lb 14.6 oz)  Weight (lb): 246.92 lb  Ideal Body Weight (IBW), Female: 120 lb  % Ideal Body Weight, Female (lb): 205.77 lb  BMI (Calculated): 42.5  BMI Grade: greater than 40 - morbid obesity       Lab/Procedures/Meds    Pertinent Labs Reviewed: reviewed  BMP  Lab Results   Component Value Date     04/06/2018    K 3.5 04/06/2018     04/06/2018    CO2 21 (L) 04/06/2018    BUN 10 04/06/2018 "    CREATININE 0.7 04/06/2018    CALCIUM 8.6 (L) 04/06/2018    ANIONGAP 9 04/06/2018    ESTGFRAFRICA >60 04/06/2018    EGFRNONAA >60 04/06/2018     Lab Results   Component Value Date    CALCIUM 8.6 (L) 04/06/2018     Lab Results   Component Value Date    ALBUMIN 3.1 (L) 04/06/2018       Pertinent Medications Reviewed: reviewed    Physical Findings/Assessment    Overall Physical Appearance: obese  Oral/Mouth Cavity:  (WDL)  Skin:  (Ronaldo Score 20)    Estimated/Assessed Needs    Weight Used For Calorie Calculations: 112 kg (246 lb 14.6 oz)  Energy Calorie Requirements (kcal): 1750  Energy Need Method: Assumption-St Jeor (No AF)  Protein Requirements:  g  Weight Used For Protein Calculations: 112 kg (246 lb 14.6 oz)     Fluid Need Method: RDA Method (or per MD)  RDA Method (mL): 1750         Nutrition Prescription Ordered    Current Diet Order: Cardiac    Evaluation of Received Nutrient/Fluid Intake    Intake/Output Summary (Last 24 hours) at 04/06/18 1410  Last data filed at 04/06/18 0800   Gross per 24 hour   Intake          2517.08 ml   Output                0 ml   Net          2517.08 ml          % Intake of Estimated Energy Needs: 50 - 75 %  % Meal Intake: 50 - 75 %    Nutrition Risk          Assessment and Plan    Morbid obesity    Contributing Nutrition Diagnosis  Morbid obesity     Related to (etiology):   Excessive energy intake     Signs and Symptoms (as evidenced by):   BMI = 42.47    Interventions/Recommendations (treatment strategy):  See above    Nutrition Diagnosis Status:   New          HTN (hypertension)    Contributing Nutrition Diagnosis  Decreased nutrient needs    Related to (etiology):   Current medical condition     Signs and Symptoms (as evidenced by):   Hx of HTN    Interventions/Recommendations (treatment strategy):  See above    Nutrition Diagnosis Status:   New               Monitor and Evaluation    Food and Nutrient Intake: energy intake, food and beverage intake  Food and Nutrient  Adminstration: diet order  Anthropometric Measurements: weight  Biochemical Data, Medical Tests and Procedures: electrolyte and renal panel, glucose/endocrine profile  Nutrition-Focused Physical Findings: overall appearance     Nutrition Follow-Up    RD Follow-up?: Yes (1xweekly)

## 2018-04-06 NOTE — PROGRESS NOTES
Presented to patients bedside. Patient is post additional breathing treatment and reports improvement in sob and chest tightness. On exam patient is not in distress, breath sounds diminished with decreased aeration throughout. Xray of chest pending. Breathing treatments adjusted and addition of Pulmicort q12, first dose now. Solumedrol IV x 1. Will continue to monitor.

## 2018-04-06 NOTE — PLAN OF CARE
Problem: Patient Care Overview  Goal: Plan of Care Review  Outcome: Ongoing (interventions implemented as appropriate)  Fall precautions maintained, patient remains free from injury. Pain being managed with PO pain medication. Cough being managed appropriately. Wheezes heard on expiration; encouraged patient to cough and deep breathe. Medications and fluids being administered as ordered. Bed low and locked with call light in reach. 12 hour chart check complete. Will continue to monitor.

## 2018-04-06 NOTE — ASSESSMENT & PLAN NOTE
- Admit to [facility]  - Sepsis criteria:  WBC 13.90, Temp 101.3, , and suspected infection pneumonia  - Influenza negative  - Blood/urine cultures obtained, results pending -- follow  - Pt received 2L NS boluses in ED  - Continue IV Rocephin and Azithromycin    4/6:  --continue IV abx  --BC with NGTD  --continue gentle IVF's

## 2018-04-06 NOTE — ASSESSMENT & PLAN NOTE
Contributing Nutrition Diagnosis  Decreased nutrient needs    Related to (etiology):   Current medical condition     Signs and Symptoms (as evidenced by):   Hx of HTN    Interventions/Recommendations (treatment strategy):  See above    Nutrition Diagnosis Status:   New

## 2018-04-06 NOTE — ASSESSMENT & PLAN NOTE
- Duonebs  - Supplemental oxygen prn, keep O2 sats > 88%  - Family member to bring home medications, she is unsure of what she takes. Once reviewed, will continue appropriate medications    4/6:  --continue duonebs and budesonide

## 2018-04-06 NOTE — PROGRESS NOTES
Contacted regarding patient with SOB, chest tightness. Additional breathing treatment ordered and Stat Chest Xray, will reassess.

## 2018-04-06 NOTE — ASSESSMENT & PLAN NOTE
- Likely secondary to pneumonia   - Continue supportive therapy with -- IV antibiotics, supplemental oxygen, and inhaled medications  - Consider O2 eval prior to discharge    4/6:  --supplemental oxygen to maintain sats  --IV abx, IV steroids, duonebs and budesonide

## 2018-04-06 NOTE — PROGRESS NOTES
Patient examined and reports the cough syrup (promethazine/codiene) is controlling her cough much better. She reports she was able to take a good nap, which she needed because she was up coughing so much last night.

## 2018-04-06 NOTE — PLAN OF CARE
Problem: Patient Care Overview  Goal: Plan of Care Review  Outcome: Ongoing (interventions implemented as appropriate)  Pt remained free from injury. Tele-sitter at bedside;IVF and IV abx maintained;Ambulate to BSC; No s/s of acute distress. Family at bedside.

## 2018-04-06 NOTE — NURSING
Contacted JV Yuan as pt reports that she cannot catch her breath and is having mild chest tightness.  She just received a resp treatment.  She had received morphine approx 1 hour before.  Kwabena stated to give another breathing treatment and contact him she does not improve.

## 2018-04-06 NOTE — PLAN OF CARE
Problem: Patient Care Overview  Goal: Plan of Care Review  Outcome: Ongoing (interventions implemented as appropriate)  Recommendations     Recommendation/Intervention: 1. Continue current diet. 2. If PO intake is consistently <50%, add boost plus BID. 3. Patient was educated on weight management and Pt was given a handout from the nutrition care manual on Weight loss Tips Pt verbalized understanding and the importance of diet compliance. Pt was provided contact information for further questions. 4. Will continue to monitor.   Goals: PO intake > 75 % by next RD visit  Nutrition Goal Status: new  Communication of RD Recs:  (POC, sticky note)

## 2018-04-06 NOTE — PLAN OF CARE
Problem: Patient Care Overview  Goal: Plan of Care Review  Outcome: Ongoing (interventions implemented as appropriate)  Pt unable to maintain ordered SpO2 saturations w/o supplemental O2. Nebulizer txs were ordered/initiated/modified this shift. NARN Therapy indications and frequency discussed with pt as well as questions to verify home resp medications/therapies if any that may need to be continued while IP.Deep breathing/calming techniques taught and practiced with pt. Pt board in room updated with RT POC. Pt instructed on how and when to call RT.

## 2018-04-06 NOTE — SUBJECTIVE & OBJECTIVE
Interval History: Patient remains SOB at rest. On oxygen @ 3l/min via [place]. She had episode of excessive coughing last night - CXR showed Mild bilateral congestion.  IV steroids ordered X 1 - will continue BID.  IS ordered. Will continue IV abx and breathing tx. Will likely need home oxygen upon dc.     Review of Systems   Constitutional: Positive for activity change, chills and fever. Negative for appetite change, diaphoresis, fatigue and unexpected weight change.   HENT: Negative.  Negative for congestion, rhinorrhea and sore throat.    Eyes: Negative.  Negative for pain and visual disturbance.   Respiratory: Positive for cough (c/o dry cough), shortness of breath and wheezing. Negative for chest tightness.    Cardiovascular: Negative for chest pain, palpitations and leg swelling.   Gastrointestinal: Negative for abdominal distention, abdominal pain, anal bleeding, blood in stool, constipation, diarrhea, nausea and vomiting.   Endocrine: Negative for polyphagia and polyuria.   Genitourinary: Negative.  Negative for decreased urine volume, difficulty urinating, dysuria, frequency, hematuria, pelvic pain, urgency, vaginal bleeding and vaginal discharge.   Musculoskeletal: Positive for arthralgias (c/o left hip pain). Negative for back pain, gait problem, joint swelling, myalgias, neck pain and neck stiffness.   Skin: Negative.  Negative for color change and wound.   Allergic/Immunologic: Negative.    Neurological: Positive for weakness. Negative for dizziness and speech difficulty.   Hematological: Negative.    Psychiatric/Behavioral: Negative.  Negative for agitation, behavioral problems and confusion.   All other systems reviewed and are negative.    Objective:     Vital Signs (Most Recent):  Temp: 97.7 °F (36.5 °C) (04/06/18 0812)  Pulse: 90 (04/06/18 0909)  Resp: 20 (04/06/18 0909)  BP: 121/81 (04/06/18 0812)  SpO2: 96 % (04/06/18 0909) Vital Signs (24h Range):  Temp:  [97.7 °F (36.5 °C)-98.5 °F (36.9 °C)] 97.7 °F  (36.5 °C)  Pulse:  [] 90  Resp:  [17-22] 20  SpO2:  [92 %-99 %] 96 %  BP: (106-121)/(50-81) 121/81     Weight: 112 kg (246 lb 14.6 oz)  Body mass index is 42.38 kg/m².    Intake/Output Summary (Last 24 hours) at 04/06/18 1127  Last data filed at 04/06/18 0800   Gross per 24 hour   Intake          2697.08 ml   Output                0 ml   Net          2697.08 ml      Physical Exam   Constitutional: She is oriented to person, place, and time. She appears well-developed. She appears ill.   HENT:   Head: Normocephalic and atraumatic.   Mouth/Throat: Oropharynx is clear and moist.   Eyes: Conjunctivae and EOM are normal. Pupils are equal, round, and reactive to light.   Neck: Normal range of motion. Neck supple. No JVD present.   Cardiovascular: Normal rate, regular rhythm, normal heart sounds and intact distal pulses.    No murmur heard.  Pulmonary/Chest: Tachypnea noted. She is in respiratory distress. She has wheezes in the right upper field, the right middle field, the right lower field, the left upper field and the left lower field.   Abdominal: Soft. Bowel sounds are normal. She exhibits no distension. There is no tenderness. There is no rebound and no guarding.   Genitourinary:   Genitourinary Comments: Deferred   Musculoskeletal: Normal range of motion. She exhibits tenderness. She exhibits no edema.        Left hip: She exhibits tenderness.   Neurological: She is alert and oriented to person, place, and time. No cranial nerve deficit or sensory deficit.   Skin: Skin is warm and dry. Capillary refill takes less than 2 seconds.   Psychiatric: She has a normal mood and affect. Her behavior is normal. Judgment and thought content normal.   Nursing note and vitals reviewed.      Significant Labs:   Recent Lab Results       04/06/18  0435 04/06/18  0005      Albumin 3.1(L)      Alkaline Phosphatase 85      ALT 13      Anion Gap 9      AST 18      Baso # 0.01      Basophil% 0.1      Total Bilirubin  0.3  Comment:  For infants and newborns, interpretation of results should be based  on gestational age, weight and in agreement with clinical  observations.  Premature Infant recommended reference ranges:  Up to 24 hours.............<8.0 mg/dL  Up to 48 hours............<12.0 mg/dL  3-5 days..................<15.0 mg/dL  6-29 days.................<15.0 mg/dL        BNP  208  Comment:  Values of less than 100 pg/ml are consistent with non-CHF populations.(H)     BUN, Bld 10      Calcium 8.6(L)      Chloride 107      CO2 21(L)      Creatinine 0.7      Differential Method Automated      eGFR if  >60      eGFR if non  >60  Comment:  Calculation used to obtain the estimated glomerular filtration  rate (eGFR) is the CKD-EPI equation.         Eos # 0.0      Eosinophil% 0.2      Glucose 148(H)      Gran # (ANC) 12.5(H)      Gran% 95.7(H)      Hematocrit 35.3(L)      Hemoglobin 11.2(L)      Lymph # 0.3(L)      Lymph% 2.5(L)      MCH 29.7      MCHC 31.7(L)      MCV 94      Mono # 0.2(L)      Mono% 1.5(L)      MPV 10.6      Platelets 233      Potassium 3.5      Total Protein 6.9      RBC 3.77(L)      RDW 13.7      Sodium 137      Troponin I 0.009  Comment:  The reference interval for Troponin I represents the 99th percentile   cutoff   for our facility and is consistent with 3rd generation assay   performance.   <0.006  Comment:  The reference interval for Troponin I represents the 99th percentile   cutoff   for our facility and is consistent with 3rd generation assay   performance.       WBC 13.09(H)          All pertinent labs within the past 24 hours have been reviewed.    Significant Imaging: I have reviewed all pertinent imaging results/findings within the past 24 hours.

## 2018-04-06 NOTE — ASSESSMENT & PLAN NOTE
Contributing Nutrition Diagnosis  Morbid obesity     Related to (etiology):   Excessive energy intake     Signs and Symptoms (as evidenced by):   BMI = 42.47    Interventions/Recommendations (treatment strategy):  See above    Nutrition Diagnosis Status:   New

## 2018-04-06 NOTE — PROGRESS NOTES
[facility] Hospital Medicine  Progress Note    Patient Name: Valorie Monzon  MRN: [id number]  Patient Class: IP- Inpatient   Admission Date: 4/4/2018  Length of Stay: 2 days  Attending Physician: Marcella Valdivia MD  Primary Care Provider: Karri Levy MD        Subjective:     Principal Problem:Sepsis    HPI:  Valorie Monzon is a 47 year old female with a PMHx of Tobacco abuse (quit 2 months ago), HTN, COPD, and CVA x 2 who presented to the Emergency Department with c/o SOB that started early this morning around 0200. Associated symptoms include: fever, chills, and falls. Pt reports she would get up to go to the bathroom, get SOB, and fall. Pt reports falling x 3 -- fell on left side (c/o left hip pain) and hit her head. Unknown LOC. Upon arrival to ED, pt tachycardiac 130 BPM, febrile 101.3, and hypoxic 89% room air. ED workup revealed: WBC 13.90, K+ 3.3, procalcitonin 0.73, influenza negative, ABG PO2 49. CXR with a moderate amount of alveolar consolidation scattered throughout the lower two thirds of the left lung. This is consistent with the patient's history and characteristic of pneumonia and is mild cardiomegaly. Pt admitted to [facility] for Sepsis secondary to CAP.     Hospital Course:  4/5/18 The patient reports that she continues to have increased shortness of breath and coughing. WBC increased to 15K. Afebrile overnight. O2 sats improved with supplemental O2. The patient will likely need a home O2 eval prior to discharge.     Interval History: Patient remains SOB at rest. On oxygen @ 3l/min via [place]. She had episode of excessive coughing last night - CXR showed Mild bilateral congestion.  IV steroids ordered X 1 - will continue BID.  IS ordered. Will continue IV abx and breathing tx. Will likely need home oxygen upon dc.     Review of Systems   Constitutional: Positive for activity change, chills and fever. Negative for appetite change, diaphoresis, fatigue and unexpected  weight change.   HENT: Negative.  Negative for congestion, rhinorrhea and sore throat.    Eyes: Negative.  Negative for pain and visual disturbance.   Respiratory: Positive for cough (c/o dry cough), shortness of breath and wheezing. Negative for chest tightness.    Cardiovascular: Negative for chest pain, palpitations and leg swelling.   Gastrointestinal: Negative for abdominal distention, abdominal pain, anal bleeding, blood in stool, constipation, diarrhea, nausea and vomiting.   Endocrine: Negative for polyphagia and polyuria.   Genitourinary: Negative.  Negative for decreased urine volume, difficulty urinating, dysuria, frequency, hematuria, pelvic pain, urgency, vaginal bleeding and vaginal discharge.   Musculoskeletal: Positive for arthralgias (c/o left hip pain). Negative for back pain, gait problem, joint swelling, myalgias, neck pain and neck stiffness.   Skin: Negative.  Negative for color change and wound.   Allergic/Immunologic: Negative.    Neurological: Positive for weakness. Negative for dizziness and speech difficulty.   Hematological: Negative.    Psychiatric/Behavioral: Negative.  Negative for agitation, behavioral problems and confusion.   All other systems reviewed and are negative.    Objective:     Vital Signs (Most Recent):  Temp: 97.7 °F (36.5 °C) (04/06/18 0812)  Pulse: 90 (04/06/18 0909)  Resp: 20 (04/06/18 0909)  BP: 121/81 (04/06/18 0812)  SpO2: 96 % (04/06/18 0909) Vital Signs (24h Range):  Temp:  [97.7 °F (36.5 °C)-98.5 °F (36.9 °C)] 97.7 °F (36.5 °C)  Pulse:  [] 90  Resp:  [17-22] 20  SpO2:  [92 %-99 %] 96 %  BP: (106-121)/(50-81) 121/81     Weight: 112 kg (246 lb 14.6 oz)  Body mass index is 42.38 kg/m².    Intake/Output Summary (Last 24 hours) at 04/06/18 1127  Last data filed at 04/06/18 0800   Gross per 24 hour   Intake          2697.08 ml   Output                0 ml   Net          2697.08 ml      Physical Exam   Constitutional: She is oriented to person, place, and time.  She appears well-developed. She appears ill.   HENT:   Head: Normocephalic and atraumatic.   Mouth/Throat: Oropharynx is clear and moist.   Eyes: Conjunctivae and EOM are normal. Pupils are equal, round, and reactive to light.   Neck: Normal range of motion. Neck supple. No JVD present.   Cardiovascular: Normal rate, regular rhythm, normal heart sounds and intact distal pulses.    No murmur heard.  Pulmonary/Chest: Tachypnea noted. She is in respiratory distress. She has wheezes in the right upper field, the right middle field, the right lower field, the left upper field and the left lower field.   Abdominal: Soft. Bowel sounds are normal. She exhibits no distension. There is no tenderness. There is no rebound and no guarding.   Genitourinary:   Genitourinary Comments: Deferred   Musculoskeletal: Normal range of motion. She exhibits tenderness. She exhibits no edema.        Left hip: She exhibits tenderness.   Neurological: She is alert and oriented to person, place, and time. No cranial nerve deficit or sensory deficit.   Skin: Skin is warm and dry. Capillary refill takes less than 2 seconds.   Psychiatric: She has a normal mood and affect. Her behavior is normal. Judgment and thought content normal.   Nursing note and vitals reviewed.      Significant Labs:   Recent Lab Results       04/06/18  0435 04/06/18  0005      Albumin 3.1(L)      Alkaline Phosphatase 85      ALT 13      Anion Gap 9      AST 18      Baso # 0.01      Basophil% 0.1      Total Bilirubin 0.3  Comment:  For infants and newborns, interpretation of results should be based  on gestational age, weight and in agreement with clinical  observations.  Premature Infant recommended reference ranges:  Up to 24 hours.............<8.0 mg/dL  Up to 48 hours............<12.0 mg/dL  3-5 days..................<15.0 mg/dL  6-29 days.................<15.0 mg/dL        BNP  208  Comment:  Values of less than 100 pg/ml are consistent with non-CHF populations.(H)      BUN, Bld 10      Calcium 8.6(L)      Chloride 107      CO2 21(L)      Creatinine 0.7      Differential Method Automated      eGFR if  >60      eGFR if non  >60  Comment:  Calculation used to obtain the estimated glomerular filtration  rate (eGFR) is the CKD-EPI equation.         Eos # 0.0      Eosinophil% 0.2      Glucose 148(H)      Gran # (ANC) 12.5(H)      Gran% 95.7(H)      Hematocrit 35.3(L)      Hemoglobin 11.2(L)      Lymph # 0.3(L)      Lymph% 2.5(L)      MCH 29.7      MCHC 31.7(L)      MCV 94      Mono # 0.2(L)      Mono% 1.5(L)      MPV 10.6      Platelets 233      Potassium 3.5      Total Protein 6.9      RBC 3.77(L)      RDW 13.7      Sodium 137      Troponin I 0.009  Comment:  The reference interval for Troponin I represents the 99th percentile   cutoff   for our facility and is consistent with 3rd generation assay   performance.   <0.006  Comment:  The reference interval for Troponin I represents the 99th percentile   cutoff   for our facility and is consistent with 3rd generation assay   performance.       WBC 13.09(H)          All pertinent labs within the past 24 hours have been reviewed.    Significant Imaging: I have reviewed all pertinent imaging results/findings within the past 24 hours.    Assessment/Plan:      * Sepsis    - Admit to [facility]  - Sepsis criteria:  WBC 13.90, Temp 101.3, , and suspected infection pneumonia  - Influenza negative  - Blood/urine cultures obtained, results pending -- follow  - Pt received 2L NS boluses in ED  - Continue IV Rocephin and Azithromycin    4/6:  --continue IV abx  --BC with NGTD  --continue gentle IVF's          Morbid obesity    --counseled on weight loss  --registered dietitian consulted          COPD (chronic obstructive pulmonary disease)    - Duonebs  - Supplemental oxygen prn, keep O2 sats > 88%  - Family member to bring home medications, she is unsure of what she takes. Once reviewed, will continue appropriate  medications    4/6:  --continue duonebs and budesonide              Fall    - Unknown LOC  - Pt c/o headache earlier which is now resolved and currently c/o left hip pain  - Obtain CT head and left hip xray s/p fall prior to arrival          Hypokalemia    - Resolved   - K+ 3.9  - Given KCL 30 mEq PO x 1  - Repeat BMP in AM    4/6:  --K+ 3.5  --Kcl 40meq PO X1  --monitor and replete as needed        Tobacco abuse    - Pt reports quitting 2 months ago  - Continue nicotine patch          HTN (hypertension)    - Family member to bring home medications, she is unsure of what she takes. Once reviewed, will continue appropriate medications  - Apresoline prn          Acute respiratory failure with hypoxia    - Likely secondary to pneumonia   - Continue supportive therapy with -- IV antibiotics, supplemental oxygen, and inhaled medications  - Consider O2 eval prior to discharge    4/6:  --supplemental oxygen to maintain sats  --IV abx, IV steroids, duonebs and budesonide        Pneumonia of left lower lobe due to infectious organism    - CXR with moderate amount of alveolar consolidation scattered throughout the lower two thirds of the left lung. This is consistent with the patient's history and characteristic of pneumonia.  - Blood cultures obtained, results pending  - Continue IV Azithromycin and Rocephin  - Scheduled Duonebs  - Supplemental oxygen prn, keep O2 sats > 88% (hx of COPD)  - Mucinex    4/6:  --continue IV abx  --BC with NGTD  --scheduled duonebs and budesonide via nebulizer  --IV steroids added  --continue supplemental oxygen  --added promethazine cough syrup          VTE Risk Mitigation         Ordered     enoxaparin injection 40 mg  Daily     Route:  Subcutaneous        04/06/18 1212     Place sequential compression device  Until discontinued      04/04/18 2117     IP VTE HIGH RISK PATIENT  Once      04/04/18 2029              RON Nascimento-C  Department of Hospital Medicine   [facility]

## 2018-04-07 LAB
ANION GAP SERPL CALC-SCNC: 11 MMOL/L
BASOPHILS # BLD AUTO: 0.01 K/UL
BASOPHILS NFR BLD: 0.1 %
BUN SERPL-MCNC: 12 MG/DL
CALCIUM SERPL-MCNC: 8.9 MG/DL
CHLORIDE SERPL-SCNC: 109 MMOL/L
CO2 SERPL-SCNC: 21 MMOL/L
CREAT SERPL-MCNC: 0.7 MG/DL
DIFFERENTIAL METHOD: ABNORMAL
EOSINOPHIL # BLD AUTO: 0 K/UL
EOSINOPHIL NFR BLD: 0 %
ERYTHROCYTE [DISTWIDTH] IN BLOOD BY AUTOMATED COUNT: 13.8 %
EST. GFR  (AFRICAN AMERICAN): >60 ML/MIN/1.73 M^2
EST. GFR  (NON AFRICAN AMERICAN): >60 ML/MIN/1.73 M^2
GLUCOSE SERPL-MCNC: 184 MG/DL
HCT VFR BLD AUTO: 34.2 %
HGB BLD-MCNC: 11.2 G/DL
LYMPHOCYTES # BLD AUTO: 0.6 K/UL
LYMPHOCYTES NFR BLD: 3.9 %
MCH RBC QN AUTO: 32.4 PG
MCHC RBC AUTO-ENTMCNC: 32.7 G/DL
MCV RBC AUTO: 99 FL
MONOCYTES # BLD AUTO: 0.3 K/UL
MONOCYTES NFR BLD: 2.1 %
NEUTROPHILS # BLD AUTO: 13.2 K/UL
NEUTROPHILS NFR BLD: 93.9 %
PLATELET # BLD AUTO: 248 K/UL
PMV BLD AUTO: 10.2 FL
POTASSIUM SERPL-SCNC: 3.7 MMOL/L
RBC # BLD AUTO: 3.46 M/UL
SODIUM SERPL-SCNC: 141 MMOL/L
WBC # BLD AUTO: 14.04 K/UL

## 2018-04-07 PROCEDURE — 25500020 PHARM REV CODE 255: Performed by: INTERNAL MEDICINE

## 2018-04-07 PROCEDURE — 27100171 HC OXYGEN HIGH FLOW UP TO 24 HOURS

## 2018-04-07 PROCEDURE — 27000221 HC OXYGEN, UP TO 24 HOURS

## 2018-04-07 PROCEDURE — 21400001 HC TELEMETRY ROOM

## 2018-04-07 PROCEDURE — 25000003 PHARM REV CODE 250: Performed by: NURSE PRACTITIONER

## 2018-04-07 PROCEDURE — 25000242 PHARM REV CODE 250 ALT 637 W/ HCPCS: Performed by: NURSE PRACTITIONER

## 2018-04-07 PROCEDURE — 63600175 PHARM REV CODE 636 W HCPCS: Performed by: INTERNAL MEDICINE

## 2018-04-07 PROCEDURE — 80048 BASIC METABOLIC PNL TOTAL CA: CPT

## 2018-04-07 PROCEDURE — 94760 N-INVAS EAR/PLS OXIMETRY 1: CPT

## 2018-04-07 PROCEDURE — 63600175 PHARM REV CODE 636 W HCPCS: Performed by: NURSE PRACTITIONER

## 2018-04-07 PROCEDURE — 94640 AIRWAY INHALATION TREATMENT: CPT

## 2018-04-07 PROCEDURE — 25000003 PHARM REV CODE 250: Performed by: FAMILY MEDICINE

## 2018-04-07 PROCEDURE — 99900035 HC TECH TIME PER 15 MIN (STAT)

## 2018-04-07 PROCEDURE — 85025 COMPLETE CBC W/AUTO DIFF WBC: CPT

## 2018-04-07 PROCEDURE — S4991 NICOTINE PATCH NONLEGEND: HCPCS | Performed by: NURSE PRACTITIONER

## 2018-04-07 PROCEDURE — 36415 COLL VENOUS BLD VENIPUNCTURE: CPT

## 2018-04-07 RX ADMIN — METHYLPREDNISOLONE SODIUM SUCCINATE 80 MG: 40 INJECTION, POWDER, FOR SOLUTION INTRAMUSCULAR; INTRAVENOUS at 02:04

## 2018-04-07 RX ADMIN — PROMETHAZINE HYDROCHLORIDE AND CODEINE PHOSPHATE 5 ML: 6.25; 1 SYRUP ORAL at 01:04

## 2018-04-07 RX ADMIN — IPRATROPIUM BROMIDE AND ALBUTEROL SULFATE 3 ML: .5; 3 SOLUTION RESPIRATORY (INHALATION) at 07:04

## 2018-04-07 RX ADMIN — PROMETHAZINE HYDROCHLORIDE AND CODEINE PHOSPHATE 5 ML: 6.25; 1 SYRUP ORAL at 08:04

## 2018-04-07 RX ADMIN — PANTOPRAZOLE SODIUM 40 MG: 40 TABLET, DELAYED RELEASE ORAL at 08:04

## 2018-04-07 RX ADMIN — BUDESONIDE 0.5 MG: 0.5 SUSPENSION RESPIRATORY (INHALATION) at 07:04

## 2018-04-07 RX ADMIN — HYDROCODONE BITARTRATE AND ACETAMINOPHEN 1 TABLET: 10; 325 TABLET ORAL at 08:04

## 2018-04-07 RX ADMIN — ENOXAPARIN SODIUM 40 MG: 100 INJECTION SUBCUTANEOUS at 05:04

## 2018-04-07 RX ADMIN — DULOXETINE 60 MG: 30 CAPSULE, DELAYED RELEASE ORAL at 08:04

## 2018-04-07 RX ADMIN — IOHEXOL 100 ML: 350 INJECTION, SOLUTION INTRAVENOUS at 02:04

## 2018-04-07 RX ADMIN — CARVEDILOL 12.5 MG: 12.5 TABLET, FILM COATED ORAL at 08:04

## 2018-04-07 RX ADMIN — IPRATROPIUM BROMIDE AND ALBUTEROL SULFATE 3 ML: .5; 3 SOLUTION RESPIRATORY (INHALATION) at 11:04

## 2018-04-07 RX ADMIN — AZITHROMYCIN MONOHYDRATE 500 MG: 500 INJECTION, POWDER, LYOPHILIZED, FOR SOLUTION INTRAVENOUS at 08:04

## 2018-04-07 RX ADMIN — CEFTRIAXONE SODIUM 1 G: 1 INJECTION, POWDER, FOR SOLUTION INTRAMUSCULAR; INTRAVENOUS at 08:04

## 2018-04-07 RX ADMIN — PROMETHAZINE HYDROCHLORIDE AND CODEINE PHOSPHATE 5 ML: 6.25; 1 SYRUP ORAL at 06:04

## 2018-04-07 RX ADMIN — DIPHENHYDRAMINE HYDROCHLORIDE 25 MG: 25 CAPSULE ORAL at 12:04

## 2018-04-07 RX ADMIN — PROMETHAZINE HYDROCHLORIDE AND CODEINE PHOSPHATE 5 ML: 6.25; 1 SYRUP ORAL at 10:04

## 2018-04-07 RX ADMIN — BUDESONIDE 0.5 MG: 0.5 SUSPENSION RESPIRATORY (INHALATION) at 08:04

## 2018-04-07 RX ADMIN — ASPIRIN 325 MG: 325 TABLET, COATED ORAL at 08:04

## 2018-04-07 RX ADMIN — IPRATROPIUM BROMIDE AND ALBUTEROL SULFATE 3 ML: .5; 3 SOLUTION RESPIRATORY (INHALATION) at 05:04

## 2018-04-07 RX ADMIN — GABAPENTIN 600 MG: 300 CAPSULE ORAL at 08:04

## 2018-04-07 RX ADMIN — PROMETHAZINE HYDROCHLORIDE AND CODEINE PHOSPHATE 5 ML: 6.25; 1 SYRUP ORAL at 02:04

## 2018-04-07 RX ADMIN — HYDROCODONE BITARTRATE AND ACETAMINOPHEN 1 TABLET: 10; 325 TABLET ORAL at 01:04

## 2018-04-07 RX ADMIN — METHYLPREDNISOLONE SODIUM SUCCINATE 80 MG: 40 INJECTION, POWDER, FOR SOLUTION INTRAMUSCULAR; INTRAVENOUS at 05:04

## 2018-04-07 RX ADMIN — IPRATROPIUM BROMIDE AND ALBUTEROL SULFATE 3 ML: .5; 3 SOLUTION RESPIRATORY (INHALATION) at 04:04

## 2018-04-07 RX ADMIN — NICOTINE 1 PATCH: 21 PATCH, EXTENDED RELEASE TRANSDERMAL at 08:04

## 2018-04-07 RX ADMIN — GUAIFENESIN 600 MG: 600 TABLET, EXTENDED RELEASE ORAL at 08:04

## 2018-04-07 RX ADMIN — METHYLPREDNISOLONE SODIUM SUCCINATE 80 MG: 40 INJECTION, POWDER, FOR SOLUTION INTRAMUSCULAR; INTRAVENOUS at 09:04

## 2018-04-07 RX ADMIN — IPRATROPIUM BROMIDE AND ALBUTEROL SULFATE 3 ML: .5; 3 SOLUTION RESPIRATORY (INHALATION) at 08:04

## 2018-04-07 RX ADMIN — ATORVASTATIN CALCIUM 40 MG: 40 TABLET, FILM COATED ORAL at 08:04

## 2018-04-07 RX ADMIN — HYDROCODONE BITARTRATE AND ACETAMINOPHEN 1 TABLET: 10; 325 TABLET ORAL at 02:04

## 2018-04-07 NOTE — SUBJECTIVE & OBJECTIVE
Interval History: Patient reports she is still extremely SOB with any activity or taking oxygen off. She continues to have a cough that is not controlled with cough medication. CTA ordered. Consulted Pulm. Disease management. Will continue abx. Pt will be assessed for home oxygen before dc.    Review of Systems   Constitutional: Positive for activity change, chills and fever. Negative for appetite change, diaphoresis, fatigue and unexpected weight change.   HENT: Negative.  Negative for congestion, rhinorrhea and sore throat.    Eyes: Negative.  Negative for pain and visual disturbance.   Respiratory: Positive for cough (c/o dry cough), shortness of breath and wheezing. Negative for chest tightness.    Cardiovascular: Negative for chest pain, palpitations and leg swelling.   Gastrointestinal: Negative for abdominal distention, abdominal pain, anal bleeding, blood in stool, constipation, diarrhea, nausea and vomiting.   Endocrine: Negative for polyphagia and polyuria.   Genitourinary: Negative.  Negative for decreased urine volume, difficulty urinating, dysuria, frequency, hematuria, pelvic pain, urgency, vaginal bleeding and vaginal discharge.   Musculoskeletal: Positive for arthralgias (c/o left hip pain). Negative for back pain, gait problem, joint swelling, myalgias, neck pain and neck stiffness.   Skin: Negative.  Negative for color change and wound.   Allergic/Immunologic: Negative.    Neurological: Positive for weakness. Negative for dizziness and speech difficulty.   Hematological: Negative.    Psychiatric/Behavioral: Negative.  Negative for agitation, behavioral problems and confusion.   All other systems reviewed and are negative.    Objective:     Vital Signs (Most Recent):  Temp: 97.5 °F (36.4 °C) (04/07/18 0710)  Pulse: 92 (04/07/18 0722)  Resp: 18 (04/07/18 0722)  BP: 123/70 (04/07/18 0710)  SpO2: 98 % (04/07/18 0719) Vital Signs (24h Range):  Temp:  [97.4 °F (36.3 °C)-98.9 °F (37.2 °C)] 97.5 °F (36.4  °C)  Pulse:  [] 92  Resp:  [18-24] 18  SpO2:  [95 %-99 %] 98 %  BP: (105-135)/(67-81) 123/70     Weight: 112 kg (246 lb 14.6 oz)  Body mass index is 42.38 kg/m².    Intake/Output Summary (Last 24 hours) at 04/07/18 0956  Last data filed at 04/07/18 0209   Gross per 24 hour   Intake             1900 ml   Output                0 ml   Net             1900 ml      Physical Exam   Constitutional: She is oriented to person, place, and time. She appears well-developed. She appears ill.   HENT:   Head: Normocephalic and atraumatic.   Mouth/Throat: Oropharynx is clear and moist.   Eyes: Conjunctivae and EOM are normal. Pupils are equal, round, and reactive to light.   Neck: Normal range of motion. Neck supple. No JVD present.   Cardiovascular: Normal rate, regular rhythm, normal heart sounds and intact distal pulses.    No murmur heard.  Pulmonary/Chest: Tachypnea noted. She is in respiratory distress. She has wheezes in the right upper field, the right middle field, the right lower field, the left upper field and the left lower field.   Abdominal: Soft. Bowel sounds are normal. She exhibits no distension. There is no tenderness. There is no rebound and no guarding.   Genitourinary:   Genitourinary Comments: Deferred   Musculoskeletal: Normal range of motion. She exhibits tenderness. She exhibits no edema.        Left hip: She exhibits tenderness.   Neurological: She is alert and oriented to person, place, and time. No cranial nerve deficit or sensory deficit.   Skin: Skin is warm and dry. Capillary refill takes less than 2 seconds.   Psychiatric: She has a normal mood and affect. Her behavior is normal. Judgment and thought content normal.   Nursing note and vitals reviewed.      Significant Labs:   Recent Lab Results       04/06/18  1151      Troponin I <0.006  Comment:  The reference interval for Troponin I represents the 99th percentile   cutoff   for our facility and is consistent with 3rd generation assay    performance.           All pertinent labs within the past 24 hours have been reviewed.    Significant Imaging: I have reviewed all pertinent imaging results/findings within the past 24 hours.

## 2018-04-07 NOTE — ASSESSMENT & PLAN NOTE
- Likely secondary to pneumonia   - Continue supportive therapy with -- IV antibiotics, supplemental oxygen, and inhaled medications  - Consider O2 eval prior to discharge    4/6:  --supplemental oxygen to maintain sats  --IV abx, IV steroids, duonebs and budesonide    4/7:  --CTA chest pending

## 2018-04-07 NOTE — PROGRESS NOTES
Home Oxygen Evaluation    Date Performed: 2018    1) Patient's Home O2 Sat on room air, while at rest: 95          If O2 sats on room air at rest are 88% or below, patient qualifies. No additional testing needed. Document N/A in steps 2 and 3. If 89% or above, complete steps 2.      2) Patient's O2 Sat on room air while exercisin        If O2 sats on room air while exercising remain 89% or above patient does not qualify, no further testing needed Document N/A in step 3. If O2 sats on room air while exercising are 88% or below, continue to step 3.      3) Patient's O2 Sat while exercising on O2:  at LPM         (Must show improvement from #2 for patients to qualify)    If O2 sats improve on oxygen, patient qualifies for portable oxygen. If not, the patient does not qualify.      PATIENT DOES NOT QUALIFY

## 2018-04-07 NOTE — PLAN OF CARE
Problem: Patient Care Overview  Goal: Plan of Care Review  Outcome: Ongoing (interventions implemented as appropriate)  90's-100's on cardiac monitor. VSS. Pt and family refused bed alarm during night.   Fall precautions in place. Call light and personal items within reach. Educated patient on side effects of medication administered. Pt verbalized understanding. 24 hour order check done.  Will continue to monitor.

## 2018-04-07 NOTE — ASSESSMENT & PLAN NOTE
- Admit to [facility]  - Sepsis criteria:  WBC 13.90, Temp 101.3, , and suspected infection pneumonia  - Influenza negative  - Blood/urine cultures obtained, results pending -- follow  - Pt received 2L NS boluses in ED  - Continue IV Rocephin and Azithromycin    4/6:  --continue IV abx  --BC with NGTD  --continue gentle IVF's

## 2018-04-07 NOTE — PROGRESS NOTES
Patient with son and daughter in law in room. Discussed CTA results. Pt is breathing much better this afternoon. Answered family's questions. Ordered home oxygen eval.

## 2018-04-07 NOTE — PROGRESS NOTES
Avasys discontinued. Patient AAOx4. Educated and understands not to get out of bed without assistance.  at bedside. Patient refused bed alarm at this time.

## 2018-04-07 NOTE — PROGRESS NOTES
[facility] Hospital Medicine  Progress Note    Patient Name: Valorie Monzon  MRN: [id number]  Patient Class: IP- Inpatient   Admission Date: 4/4/2018  Length of Stay: 3 days  Attending Physician: Steve Lpee MD  Primary Care Provider: Karri Levy MD        Subjective:     Principal Problem:Sepsis    HPI:  Valorie Monzon is a 47 year old female with a PMHx of Tobacco abuse (quit 2 months ago), HTN, COPD, and CVA x 2 who presented to the Emergency Department with c/o SOB that started early this morning around 0200. Associated symptoms include: fever, chills, and falls. Pt reports she would get up to go to the bathroom, get SOB, and fall. Pt reports falling x 3 -- fell on left side (c/o left hip pain) and hit her head. Unknown LOC. Upon arrival to ED, pt tachycardiac 130 BPM, febrile 101.3, and hypoxic 89% room air. ED workup revealed: WBC 13.90, K+ 3.3, procalcitonin 0.73, influenza negative, ABG PO2 49. CXR with a moderate amount of alveolar consolidation scattered throughout the lower two thirds of the left lung. This is consistent with the patient's history and characteristic of pneumonia and is mild cardiomegaly. Pt admitted to [facility] for Sepsis secondary to CAP.     Hospital Course:  4/5/18 The patient reports that she continues to have increased shortness of breath and coughing. WBC increased to 15K. Afebrile overnight. O2 sats improved with supplemental O2. The patient will likely need a home O2 eval prior to discharge. 4/6/18: Patient remains SOB at rest. On oxygen @ 3l/min via Nc. She had episode of excessive coughing last night - CXR showed Mild bilateral congestion.  IV steroids ordered X 1 - will continue BID.  IS ordered. Will continue IV abx and breathing tx. Will likely need home oxygen upon dc.        Interval History: Patient reports she is still extremely SOB with any activity or taking oxygen off. She continues to have a cough that is not controlled with  cough medication. CTA ordered. Consulted Pulm. Disease management. Will continue abx. Pt will be assessed for home oxygen before dc.    Review of Systems   Constitutional: Positive for activity change, chills and fever. Negative for appetite change, diaphoresis, fatigue and unexpected weight change.   HENT: Negative.  Negative for congestion, rhinorrhea and sore throat.    Eyes: Negative.  Negative for pain and visual disturbance.   Respiratory: Positive for cough (c/o dry cough), shortness of breath and wheezing. Negative for chest tightness.    Cardiovascular: Negative for chest pain, palpitations and leg swelling.   Gastrointestinal: Negative for abdominal distention, abdominal pain, anal bleeding, blood in stool, constipation, diarrhea, nausea and vomiting.   Endocrine: Negative for polyphagia and polyuria.   Genitourinary: Negative.  Negative for decreased urine volume, difficulty urinating, dysuria, frequency, hematuria, pelvic pain, urgency, vaginal bleeding and vaginal discharge.   Musculoskeletal: Positive for arthralgias (c/o left hip pain). Negative for back pain, gait problem, joint swelling, myalgias, neck pain and neck stiffness.   Skin: Negative.  Negative for color change and wound.   Allergic/Immunologic: Negative.    Neurological: Positive for weakness. Negative for dizziness and speech difficulty.   Hematological: Negative.    Psychiatric/Behavioral: Negative.  Negative for agitation, behavioral problems and confusion.   All other systems reviewed and are negative.    Objective:     Vital Signs (Most Recent):  Temp: 97.5 °F (36.4 °C) (04/07/18 0710)  Pulse: 92 (04/07/18 0722)  Resp: 18 (04/07/18 0722)  BP: 123/70 (04/07/18 0710)  SpO2: 98 % (04/07/18 0719) Vital Signs (24h Range):  Temp:  [97.4 °F (36.3 °C)-98.9 °F (37.2 °C)] 97.5 °F (36.4 °C)  Pulse:  [] 92  Resp:  [18-24] 18  SpO2:  [95 %-99 %] 98 %  BP: (105-135)/(67-81) 123/70     Weight: 112 kg (246 lb 14.6 oz)  Body mass index is 42.38  kg/m².    Intake/Output Summary (Last 24 hours) at 04/07/18 0956  Last data filed at 04/07/18 0209   Gross per 24 hour   Intake             1900 ml   Output                0 ml   Net             1900 ml      Physical Exam   Constitutional: She is oriented to person, place, and time. She appears well-developed. She appears ill.   HENT:   Head: Normocephalic and atraumatic.   Mouth/Throat: Oropharynx is clear and moist.   Eyes: Conjunctivae and EOM are normal. Pupils are equal, round, and reactive to light.   Neck: Normal range of motion. Neck supple. No JVD present.   Cardiovascular: Normal rate, regular rhythm, normal heart sounds and intact distal pulses.    No murmur heard.  Pulmonary/Chest: Tachypnea noted. She is in respiratory distress. She has wheezes in the right upper field, the right middle field, the right lower field, the left upper field and the left lower field.   Abdominal: Soft. Bowel sounds are normal. She exhibits no distension. There is no tenderness. There is no rebound and no guarding.   Genitourinary:   Genitourinary Comments: Deferred   Musculoskeletal: Normal range of motion. She exhibits tenderness. She exhibits no edema.        Left hip: She exhibits tenderness.   Neurological: She is alert and oriented to person, place, and time. No cranial nerve deficit or sensory deficit.   Skin: Skin is warm and dry. Capillary refill takes less than 2 seconds.   Psychiatric: She has a normal mood and affect. Her behavior is normal. Judgment and thought content normal.   Nursing note and vitals reviewed.      Significant Labs:   Recent Lab Results       04/06/18  1151      Troponin I <0.006  Comment:  The reference interval for Troponin I represents the 99th percentile   cutoff   for our facility and is consistent with 3rd generation assay   performance.           All pertinent labs within the past 24 hours have been reviewed.    Significant Imaging: I have reviewed all pertinent imaging results/findings  within the past 24 hours.    Assessment/Plan:      * Sepsis    - Admit to [facility]  - Sepsis criteria:  WBC 13.90, Temp 101.3, , and suspected infection pneumonia  - Influenza negative  - Blood/urine cultures obtained, results pending -- follow  - Pt received 2L NS boluses in ED  - Continue IV Rocephin and Azithromycin    4/6:  --continue IV abx  --BC with NGTD  --continue gentle IVF's          Morbid obesity    --counseled on weight loss  --registered dietitian consulted          COPD (chronic obstructive pulmonary disease)    - Duonebs  - Supplemental oxygen prn, keep O2 sats > 88%  - Family member to bring home medications, she is unsure of what she takes. Once reviewed, will continue appropriate medications    4/6:  --continue duonebs and budesonide              Fall    - Unknown LOC  - Pt c/o headache earlier which is now resolved and currently c/o left hip pain  - Obtain CT head and left hip xray s/p fall prior to arrival          Hypokalemia    - Resolved   - K+ 3.9  - Given KCL 30 mEq PO x 1  - Repeat BMP in AM    4/6:  --K+ 3.5  --Kcl 40meq PO X1  --monitor and replete as needed        Tobacco abuse    - Pt reports quitting 2 months ago  - Continue nicotine patch          HTN (hypertension)    - Family member to bring home medications, she is unsure of what she takes. Once reviewed, will continue appropriate medications  - Apresoline prn          Acute respiratory failure with hypoxia    - Likely secondary to pneumonia   - Continue supportive therapy with -- IV antibiotics, supplemental oxygen, and inhaled medications  - Consider O2 eval prior to discharge    4/6:  --supplemental oxygen to maintain sats  --IV abx, IV steroids, duonebs and budesonide    4/7:  --CTA chest pending        Pneumonia of left lower lobe due to infectious organism    - CXR with moderate amount of alveolar consolidation scattered throughout the lower two thirds of the left lung. This is consistent with the patient's history  and characteristic of pneumonia.  - Blood cultures obtained, results pending  - Continue IV Azithromycin and Rocephin  - Scheduled Duonebs  - Supplemental oxygen prn, keep O2 sats > 88% (hx of COPD)  - Mucinex    4/6:  --continue IV abx  --BC with NGTD  --scheduled duonebs and budesonide via nebulizer  --IV steroids added  --continue supplemental oxygen  --added promethazine cough syrup          VTE Risk Mitigation         Ordered     enoxaparin injection 40 mg  Daily     Route:  Subcutaneous        04/06/18 1212     Place sequential compression device  Until discontinued      04/04/18 2117     IP VTE HIGH RISK PATIENT  Once      04/04/18 2029              RON Nascimento-C  Department of Hospital Medicine   [facility]

## 2018-04-07 NOTE — PLAN OF CARE
Problem: Patient Care Overview  Goal: Plan of Care Review  Outcome: Ongoing (interventions implemented as appropriate)  Patient remains free of falls and safety precautions maintained. Afebrile. Pain controlled. 3L Nc. NSR on the monitor. Patients states no complaints at this time. Will continue to monitor. 12 hour chart check.

## 2018-04-08 VITALS
DIASTOLIC BLOOD PRESSURE: 83 MMHG | SYSTOLIC BLOOD PRESSURE: 139 MMHG | OXYGEN SATURATION: 97 % | HEIGHT: 64 IN | HEART RATE: 90 BPM | TEMPERATURE: 98 F | RESPIRATION RATE: 18 BRPM | WEIGHT: 246.94 LBS | BODY MASS INDEX: 42.16 KG/M2

## 2018-04-08 LAB
ANION GAP SERPL CALC-SCNC: 9 MMOL/L
BASOPHILS # BLD AUTO: 0.01 K/UL
BASOPHILS NFR BLD: 0.1 %
BUN SERPL-MCNC: 15 MG/DL
CALCIUM SERPL-MCNC: 8.8 MG/DL
CHLORIDE SERPL-SCNC: 108 MMOL/L
CO2 SERPL-SCNC: 23 MMOL/L
CREAT SERPL-MCNC: 0.7 MG/DL
DIFFERENTIAL METHOD: ABNORMAL
EOSINOPHIL # BLD AUTO: 0 K/UL
EOSINOPHIL NFR BLD: 0 %
ERYTHROCYTE [DISTWIDTH] IN BLOOD BY AUTOMATED COUNT: 13.5 %
EST. GFR  (AFRICAN AMERICAN): >60 ML/MIN/1.73 M^2
EST. GFR  (NON AFRICAN AMERICAN): >60 ML/MIN/1.73 M^2
GLUCOSE SERPL-MCNC: 140 MG/DL
HCT VFR BLD AUTO: 35.2 %
HGB BLD-MCNC: 11.2 G/DL
LYMPHOCYTES # BLD AUTO: 0.8 K/UL
LYMPHOCYTES NFR BLD: 6.8 %
MCH RBC QN AUTO: 31.2 PG
MCHC RBC AUTO-ENTMCNC: 31.8 G/DL
MCV RBC AUTO: 98 FL
MONOCYTES # BLD AUTO: 0.3 K/UL
MONOCYTES NFR BLD: 2.6 %
NEUTROPHILS # BLD AUTO: 10.1 K/UL
NEUTROPHILS NFR BLD: 90.5 %
PLATELET # BLD AUTO: 290 K/UL
PMV BLD AUTO: 10.3 FL
POTASSIUM SERPL-SCNC: 4 MMOL/L
RBC # BLD AUTO: 3.59 M/UL
SODIUM SERPL-SCNC: 140 MMOL/L
WBC # BLD AUTO: 11.12 K/UL

## 2018-04-08 PROCEDURE — 25000003 PHARM REV CODE 250: Performed by: NURSE PRACTITIONER

## 2018-04-08 PROCEDURE — 94640 AIRWAY INHALATION TREATMENT: CPT

## 2018-04-08 PROCEDURE — S4991 NICOTINE PATCH NONLEGEND: HCPCS | Performed by: NURSE PRACTITIONER

## 2018-04-08 PROCEDURE — 80048 BASIC METABOLIC PNL TOTAL CA: CPT

## 2018-04-08 PROCEDURE — 25000242 PHARM REV CODE 250 ALT 637 W/ HCPCS: Performed by: NURSE PRACTITIONER

## 2018-04-08 PROCEDURE — 27000221 HC OXYGEN, UP TO 24 HOURS

## 2018-04-08 PROCEDURE — 85025 COMPLETE CBC W/AUTO DIFF WBC: CPT

## 2018-04-08 PROCEDURE — 36415 COLL VENOUS BLD VENIPUNCTURE: CPT

## 2018-04-08 RX ORDER — FLUTICASONE FUROATE AND VILANTEROL 100; 25 UG/1; UG/1
POWDER RESPIRATORY (INHALATION)
Qty: 60 EACH | Refills: 1 | Status: SHIPPED | OUTPATIENT
Start: 2018-04-08

## 2018-04-08 RX ORDER — PROMETHAZINE HYDROCHLORIDE AND CODEINE PHOSPHATE 6.25; 1 MG/5ML; MG/5ML
5 SOLUTION ORAL EVERY 4 HOURS PRN
Qty: 240 ML | Refills: 0 | Status: SHIPPED | OUTPATIENT
Start: 2018-04-08 | End: 2018-04-18

## 2018-04-08 RX ORDER — HYDROCODONE BITARTRATE AND ACETAMINOPHEN 10; 325 MG/1; MG/1
1 TABLET ORAL EVERY 6 HOURS PRN
Qty: 20 TABLET | Refills: 0 | OUTPATIENT
Start: 2018-04-08 | End: 2018-09-03

## 2018-04-08 RX ORDER — LEVOFLOXACIN 500 MG/1
500 TABLET, FILM COATED ORAL DAILY
Qty: 10 TABLET | Refills: 0 | Status: ON HOLD | OUTPATIENT
Start: 2018-04-08 | End: 2018-09-18

## 2018-04-08 RX ORDER — ALPRAZOLAM 0.25 MG/1
0.25 TABLET ORAL 3 TIMES DAILY PRN
Qty: 90 TABLET | Refills: 0 | Status: SHIPPED | OUTPATIENT
Start: 2018-04-08 | End: 2018-09-18

## 2018-04-08 RX ORDER — PREDNISONE 20 MG/1
TABLET ORAL
Qty: 11 TABLET | Refills: 0 | Status: SHIPPED | OUTPATIENT
Start: 2018-04-08 | End: 2018-04-13 | Stop reason: ALTCHOICE

## 2018-04-08 RX ADMIN — GUAIFENESIN 600 MG: 600 TABLET, EXTENDED RELEASE ORAL at 08:04

## 2018-04-08 RX ADMIN — PROMETHAZINE HYDROCHLORIDE AND CODEINE PHOSPHATE 5 ML: 6.25; 1 SYRUP ORAL at 10:04

## 2018-04-08 RX ADMIN — ASPIRIN 325 MG: 325 TABLET, COATED ORAL at 08:04

## 2018-04-08 RX ADMIN — DULOXETINE 60 MG: 30 CAPSULE, DELAYED RELEASE ORAL at 08:04

## 2018-04-08 RX ADMIN — CARVEDILOL 12.5 MG: 12.5 TABLET, FILM COATED ORAL at 08:04

## 2018-04-08 RX ADMIN — BUDESONIDE 0.5 MG: 0.5 SUSPENSION RESPIRATORY (INHALATION) at 07:04

## 2018-04-08 RX ADMIN — PROMETHAZINE HYDROCHLORIDE AND CODEINE PHOSPHATE 5 ML: 6.25; 1 SYRUP ORAL at 05:04

## 2018-04-08 RX ADMIN — GABAPENTIN 600 MG: 300 CAPSULE ORAL at 08:04

## 2018-04-08 RX ADMIN — PANTOPRAZOLE SODIUM 40 MG: 40 TABLET, DELAYED RELEASE ORAL at 08:04

## 2018-04-08 RX ADMIN — IPRATROPIUM BROMIDE AND ALBUTEROL SULFATE 3 ML: .5; 3 SOLUTION RESPIRATORY (INHALATION) at 12:04

## 2018-04-08 RX ADMIN — IPRATROPIUM BROMIDE AND ALBUTEROL SULFATE 3 ML: .5; 3 SOLUTION RESPIRATORY (INHALATION) at 04:04

## 2018-04-08 RX ADMIN — IPRATROPIUM BROMIDE AND ALBUTEROL SULFATE 3 ML: .5; 3 SOLUTION RESPIRATORY (INHALATION) at 07:04

## 2018-04-08 RX ADMIN — NICOTINE 1 PATCH: 21 PATCH, EXTENDED RELEASE TRANSDERMAL at 08:04

## 2018-04-08 RX ADMIN — HYDROCODONE BITARTRATE AND ACETAMINOPHEN 1 TABLET: 10; 325 TABLET ORAL at 05:04

## 2018-04-08 RX ADMIN — IPRATROPIUM BROMIDE AND ALBUTEROL SULFATE 3 ML: .5; 3 SOLUTION RESPIRATORY (INHALATION) at 11:04

## 2018-04-08 RX ADMIN — PROMETHAZINE HYDROCHLORIDE AND CODEINE PHOSPHATE 5 ML: 6.25; 1 SYRUP ORAL at 12:04

## 2018-04-08 RX ADMIN — HYDROCODONE BITARTRATE AND ACETAMINOPHEN 1 TABLET: 10; 325 TABLET ORAL at 01:04

## 2018-04-08 NOTE — PROGRESS NOTES
Patient did not qualify for home oxygen - still coughing but it is controlled with promethazine/codiene cough syrup. She is breathing much better today. Discussed possible discharge this afternoon if patient is able to tolerate no oxygen for several hours. Patient and boyfriend verbalized understanding.

## 2018-04-08 NOTE — ASSESSMENT & PLAN NOTE
- Admit to [facility]  - Sepsis criteria:  WBC 13.90, Temp 101.3, , and suspected infection pneumonia  - Influenza negative  - Blood/urine cultures obtained, results pending -- follow  - Pt received 2L NS boluses in ED  - Continue IV Rocephin and Azithromycin    4/6:  --continue IV abx  --BC with NGTD  --continue gentle IVF's    4/8: resolved

## 2018-04-08 NOTE — DISCHARGE SUMMARY
[facility] Hospital Medicine  Discharge Summary      Patient Name: Valorie Monzon  MRN: [id number]  Admission Date: 4/4/2018  Hospital Length of Stay: 4 days  Discharge Date and Time:  04/08/2018 12:54 PM  Attending Physician: Steve Lepe MD   Discharging Provider: MICHELLE NascimentoP-C  Primary Care Provider: Karri Levy MD      HPI:   Valorie Monzon is a 47 year old female with a PMHx of Tobacco abuse (quit 2 months ago), HTN, COPD, and CVA x 2 who presented to the Emergency Department with c/o SOB that started early this morning around 0200. Associated symptoms include: fever, chills, and falls. Pt reports she would get up to go to the bathroom, get SOB, and fall. Pt reports falling x 3 -- fell on left side (c/o left hip pain) and hit her head. Unknown LOC. Upon arrival to ED, pt tachycardiac 130 BPM, febrile 101.3, and hypoxic 89% room air. ED workup revealed: WBC 13.90, K+ 3.3, procalcitonin 0.73, influenza negative, ABG PO2 49. CXR with a moderate amount of alveolar consolidation scattered throughout the lower two thirds of the left lung. This is consistent with the patient's history and characteristic of pneumonia and is mild cardiomegaly. Pt admitted to [facility] for Sepsis secondary to CAP.     * No surgery found *      Hospital Course:   4/5/18 The patient reports that she continues to have increased shortness of breath and coughing. WBC increased to 15K. Afebrile overnight. O2 sats improved with supplemental O2. The patient will likely need a home O2 eval prior to discharge. 4/6/18: Patient remains SOB at rest. On oxygen @ 3l/min via Nc. She had episode of excessive coughing last night - CXR showed Mild bilateral congestion.  IV steroids ordered X 1 - will continue BID.  IS ordered. Will continue IV abx and breathing tx. Will likely need home oxygen upon dc. 4/7: Patient reports she is still extremely SOB with any activity or taking oxygen off. She continues to  have a cough that is not controlled with cough medication. CTA ordered. Consulted Pulm. Disease management. Will continue abx. Pt will be assessed for home oxygen before dc.  4/8: Patient did not qualify for home oxygen and is breathing much better today. Oxygen sat at 96% on Ra. WBC WNL. Patient will establish with pulmonology and will take PO abx, PO steroids to taper down, and start breo elipta daily (she took previously). Patient was seen and examined today and deemed stable for discharge home.        Consults:   Consults         Status Ordering Provider     Inpatient consult to Registered Dietitian/Nutritionist  Once     Provider:  (Not yet assigned)    Completed ELISABETH ORTEZ          * Sepsis    - Admit to [facility]  - Sepsis criteria:  WBC 13.90, Temp 101.3, , and suspected infection pneumonia  - Influenza negative  - Blood/urine cultures obtained, results pending -- follow  - Pt received 2L NS boluses in ED  - Continue IV Rocephin and Azithromycin    4/6:  --continue IV abx  --BC with NGTD  --continue gentle IVF's    4/8: resolved          Pneumonia of left lower lobe due to infectious organism    - CXR with moderate amount of alveolar consolidation scattered throughout the lower two thirds of the left lung. This is consistent with the patient's history and characteristic of pneumonia.  - Blood cultures obtained, results pending  - Continue IV Azithromycin and Rocephin  - Scheduled Duonebs  - Supplemental oxygen prn, keep O2 sats > 88% (hx of COPD)  - Mucinex    4/6:  --continue IV abx  --BC with NGTD  --scheduled duonebs and budesonide via nebulizer  --IV steroids added  --continue supplemental oxygen  --added promethazine cough syrup          Final Active Diagnoses:    Diagnosis Date Noted POA    PRINCIPAL PROBLEM:  Sepsis [A41.9] 04/04/2018 Yes    Morbid obesity [E66.01] 04/06/2018 Yes    Pneumonia of left lower lobe due to infectious organism [J18.1] 04/04/2018 Yes    Acute respiratory  failure with hypoxia [J96.01] 04/04/2018 Yes    HTN (hypertension) [I10] 04/04/2018 Yes    Tobacco abuse [Z72.0] 04/04/2018 Yes    Hypokalemia [E87.6] 04/04/2018 Yes    Fall [W19.XXXA] 04/04/2018 Yes    COPD (chronic obstructive pulmonary disease) [J44.9] 04/04/2018 Yes      Problems Resolved During this Admission:    Diagnosis Date Noted Date Resolved POA       Discharged Condition: stable    Disposition: Home or Self Care    Follow Up:  Follow-up Information     Karri Levy MD In 3 days.    Specialty:  Internal Medicine  Why:  hospital follow up  Contact information:  [address] [address] [address] [phone]             Alize Bland NP In 1 week.    Specialties:  Pulmonary Disease, Internal Medicine  Why:  COPD management  Contact information:  [address]  [place] [address] [phone]                 Patient Instructions:     Diet Cardiac     Activity as tolerated         Significant Diagnostic Studies: Labs:   BMP:   Recent Labs  Lab 04/07/18  1010 04/08/18  0524   * 140*    140   K 3.7 4.0    108   CO2 21* 23   BUN 12 15   CREATININE 0.7 0.7   CALCIUM 8.9 8.8   , CMP   Recent Labs  Lab 04/07/18  1010 04/08/18  0524    140   K 3.7 4.0    108   CO2 21* 23   * 140*   BUN 12 15   CREATININE 0.7 0.7   CALCIUM 8.9 8.8   ANIONGAP 11 9   ESTGFRAFRICA >60 >60   EGFRNONAA >60 >60   , CBC   Recent Labs  Lab 04/07/18  1011 04/08/18  0524   WBC 14.04* 11.12   HGB 11.2* 11.2*   HCT 34.2* 35.2*    290    and All labs within the past 24 hours have been reviewed    Pending Diagnostic Studies:     None         Medications:  Reconciled Home Medications:      Medication List      START taking these medications    ALPRAZolam 0.25 MG tablet  Commonly known as:  XANAX  Take 1 tablet (0.25 mg total) by mouth 3 (three) times daily as needed for Anxiety.     fluticasone-vilanterol 100-25 mcg/dose diskus inhaler  Commonly known  as:  BREO ELLIPTA  1 puff daily     hydrocodone-acetaminophen 10-325mg  mg Tab  Commonly known as:  NORCO  Take 1 tablet by mouth every 6 (six) hours as needed for Pain.     levoFLOXacin 500 MG tablet  Commonly known as:  LEVAQUIN  Take 1 tablet (500 mg total) by mouth once daily.     predniSONE 20 MG tablet  Commonly known as:  DELTASONE  Take 2 tabs daily X 3 days, then 1 tab daily X 3 days, then 1/2 tab daily X 3 days then 1/4 tab daily X 2 days     promethazine-codeine 6.25-10 mg/5 ml 6.25-10 mg/5 mL syrup  Commonly known as:  PHENERGAN with CODEINE  Take 5 mLs by mouth every 4 (four) hours as needed for Cough.        CONTINUE taking these medications    aspirin 325 MG EC tablet  Commonly known as:  ECOTRIN     atorvastatin 40 MG tablet  Commonly known as:  LIPITOR     CALCIUM ANTACID 300 mg (750 mg) Chew  Generic drug:  calcium carbonate     carvedilol 12.5 MG tablet  Commonly known as:  COREG     diphenhydrAMINE 25 mg capsule  Commonly known as:  BENADRYL     DULoxetine 60 MG capsule  Commonly known as:  CYMBALTA     furosemide 20 MG tablet  Commonly known as:  LASIX  Take 1 tablet (20 mg total) by mouth once daily.     gabapentin 600 MG tablet  Commonly known as:  NEURONTIN     nicotine 21-14-7 mg/24 hr Ptds     nicotine polacrilex 2 MG Lozg     omeprazole 40 MG capsule  Commonly known as:  PRILOSEC     potassium chloride SA 20 MEQ tablet  Commonly known as:  K-DUR,KLOR-CON     VENTOLIN HFA 90 mcg/actuation inhaler  Generic drug:  albuterol        STOP taking these medications    baclofen 10 MG tablet  Commonly known as:  LIORESAL     hydrOXYzine HCl 25 MG tablet  Commonly known as:  ATARAX     LORazepam 1 MG tablet  Commonly known as:  ATIVAN           Where to Get Your Medications      These medications were sent to [id number] - [place] - [address] AT [facility]  [address], [place] [address]    Hours:   24-hours Phone:  [phone]   · fluticasone-vilanterol 100-25 mcg/dose diskus inhaler  · levoFLOXacin 500 MG tablet  · predniSONE 20 MG tablet     You can get these medications from any pharmacy    Bring a paper prescription for each of these medications  · ALPRAZolam 0.25 MG tablet  · hydrocodone-acetaminophen 10-325mg  mg Tab  · promethazine-codeine 6.25-10 mg/5 ml 6.25-10 mg/5 mL syrup         Indwelling Lines/Drains at time of discharge:   Lines/Drains/Airways          No matching active lines, drains, or airways          Time spent on the discharge of patient: 60 minutes  Patient was seen and examined on the date of discharge and determined to be suitable for discharge.         YAMIL Nascimento  Department of Hospital Medicine  [facility]

## 2018-04-08 NOTE — PLAN OF CARE
Problem: Patient Care Overview  Goal: Plan of Care Review  Outcome: Outcome(s) achieved Date Met: 04/08/18  Patient remains free from injury. Pain and cough being managed appropriately. O2 saturation is 96% on room air. Shortness of breath improved. Patient discharging to home. Discharge instructions reviewed and written prescriptions given to patient. Will continue to monitor.

## 2018-04-08 NOTE — PLAN OF CARE
Problem: Patient Care Overview  Goal: Plan of Care Review  Outcome: Ongoing (interventions implemented as appropriate)  Patient tolerating breathing treatments well. No distress noted at this time.

## 2018-04-09 NOTE — PLAN OF CARE
04/09/18 0858   Final Note   Assessment Type Final Discharge Note   Discharge Disposition Home

## 2018-04-10 ENCOUNTER — PATIENT OUTREACH (OUTPATIENT)
Dept: ADMINISTRATIVE | Facility: CLINIC | Age: 48
End: 2018-04-10

## 2018-04-10 LAB
BACTERIA BLD CULT: NORMAL
BACTERIA BLD CULT: NORMAL

## 2018-04-10 NOTE — PATIENT INSTRUCTIONS
When You Have Pneumonia  You have been diagnosed with pneumonia. This is a serious lung infection. Most cases of pneumonia are caused by bacteria. Pneumonia most often occurs in older adults, young children, and people with chronic health problems.  Home care  · Take your medicine exactly as directed. Dont skip doses. Continue taking your antibiotics as until they are all gone, even if you start to feel better. This will prevent the pneumonia from coming back.  · Drink at least 8 glasses of water daily, unless directed otherwise. This helps to loosen and thin secretions so that you can cough them up.  · Use a cool-mist humidifier in your bedroom. Be sure to clean the humidifier daily.  · Dont use medicines to suppress your cough unless your cough is dry, painful, or interferes with your sleep. Coughing up mucus is normal. You may use an expectorant if your healthcare provider says its okay.  · You can use warm compresses or a heating pad on the lowest setting to relieve chest discomfort. Use several times a day for 15-20 minutes at a time. To prevent injury to your skin, set the temperature to warm, not hot. Dont put the compress or pad directly on your skin. Make certain it has a cover or wrap it in a towel. This is to prevent skin burns.  · Get plenty of rest until your fever, shortness of breath, and chest pain go away.  · Plan to get a flu shot every year. The flu is a common cause of pneumonia. Getting a flu shot every year can help prevent both the flu and pneumonia.  Getting the pneumococcal vaccine  Talk with your healthcare provider about getting the pneumococcalvaccine. Pneumococcal pneumonia is caused by bacteria that spread from person to person. It can cause minor problems, such as ear infections. But it can also turn into life-threatening illnesses of the lungs (pneumonia), the covering of the brain and spinal cord (meningitis), and the blood (bacteremia).  Children under 2 years of age, adults  over age 65, people with certain health conditions, and smokers are at the highest risk of pneumococcal disease. This vaccine can help prevent pneumococcal disease in both adults and children. Some people should not have the vaccine. Make sure to ask your healthcare provider if you should have the vaccine.   Follow-up care  Make a follow-up appointment as directed by our staff.  When to call your healthcare provider  Call your healthcare provider if you have any of the following:  · Fever above 100.4°F (38°C), or as directed by your healthcare provider  · Mucus from the lungs (sputum) thats yellow, green, bloody, or smells bad  · A large amount of sputum  · Vomiting  · Symptoms that get worse  When to call 911  Call 911 right away if you have any of the following:  · Chest pain  · Trouble breathing  · Blue lips or fingernails   Date Last Reviewed: 11/1/2016  © 7351-9635 [employer]. [address] [place] [address], [place] [address]. All rights reserved. This information is not intended as a substitute for professional medical care. Always follow your healthcare professional's instructions.

## 2018-04-13 ENCOUNTER — OFFICE VISIT (OUTPATIENT)
Dept: OBSTETRICS AND GYNECOLOGY | Facility: CLINIC | Age: 48
End: 2018-04-13
Payer: MEDICAID

## 2018-04-13 VITALS
BODY MASS INDEX: 40.54 KG/M2 | WEIGHT: 237.44 LBS | DIASTOLIC BLOOD PRESSURE: 72 MMHG | SYSTOLIC BLOOD PRESSURE: 124 MMHG | HEIGHT: 64 IN

## 2018-04-13 DIAGNOSIS — B37.31 CANDIDIASIS OF VULVA: ICD-10-CM

## 2018-04-13 DIAGNOSIS — N83.202 LEFT OVARIAN CYST: Primary | ICD-10-CM

## 2018-04-13 PROCEDURE — 99213 OFFICE O/P EST LOW 20 MIN: CPT | Mod: S$PBB,,, | Performed by: OBSTETRICS & GYNECOLOGY

## 2018-04-13 PROCEDURE — 99212 OFFICE O/P EST SF 10 MIN: CPT | Mod: PBBFAC,PO | Performed by: OBSTETRICS & GYNECOLOGY

## 2018-04-13 PROCEDURE — 99999 PR PBB SHADOW E&M-EST. PATIENT-LVL II: CPT | Mod: PBBFAC,,, | Performed by: OBSTETRICS & GYNECOLOGY

## 2018-04-13 RX ORDER — BENZONATATE 100 MG/1
CAPSULE ORAL
Status: ON HOLD | COMMUNITY
Start: 2018-04-10 | End: 2018-09-18

## 2018-04-13 RX ORDER — KETOCONAZOLE 20 MG/G
CREAM TOPICAL
Qty: 60 G | Refills: 1 | Status: ON HOLD | OUTPATIENT
Start: 2018-04-13 | End: 2018-09-18

## 2018-04-13 RX ORDER — BACLOFEN 10 MG/1
TABLET ORAL
Status: ON HOLD | COMMUNITY
Start: 2018-04-09 | End: 2018-09-18
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
## 2018-05-08 ENCOUNTER — HOSPITAL ENCOUNTER (EMERGENCY)
Facility: HOSPITAL | Age: 48
Discharge: HOME | End: 2018-05-08
Attending: EMERGENCY MEDICINE
Payer: MEDICAID

## 2018-05-08 VITALS
RESPIRATION RATE: 20 BRPM | OXYGEN SATURATION: 96 % | TEMPERATURE: 98 F | BODY MASS INDEX: 41.5 KG/M2 | DIASTOLIC BLOOD PRESSURE: 85 MMHG | HEART RATE: 89 BPM | HEIGHT: 64 IN | SYSTOLIC BLOOD PRESSURE: 135 MMHG | WEIGHT: 243.06 LBS

## 2018-05-08 DIAGNOSIS — R06.00 DYSPNEA: ICD-10-CM

## 2018-05-08 DIAGNOSIS — R05.9 COUGH: ICD-10-CM

## 2018-05-08 LAB
ALBUMIN SERPL BCP-MCNC: 3.6 G/DL
ALP SERPL-CCNC: 75 U/L
ALT SERPL W/O P-5'-P-CCNC: 22 U/L
ANION GAP SERPL CALC-SCNC: 9 MMOL/L
APTT BLDCRRT: 25.4 SEC
AST SERPL-CCNC: 32 U/L
BASOPHILS # BLD AUTO: 0.04 K/UL
BASOPHILS NFR BLD: 0.5 %
BILIRUB SERPL-MCNC: 0.1 MG/DL
BNP SERPL-MCNC: 152 PG/ML
BUN SERPL-MCNC: 15 MG/DL
CALCIUM SERPL-MCNC: 8.9 MG/DL
CHLORIDE SERPL-SCNC: 110 MMOL/L
CO2 SERPL-SCNC: 24 MMOL/L
CREAT SERPL-MCNC: 1 MG/DL
DIFFERENTIAL METHOD: ABNORMAL
EOSINOPHIL # BLD AUTO: 0.2 K/UL
EOSINOPHIL NFR BLD: 2 %
ERYTHROCYTE [DISTWIDTH] IN BLOOD BY AUTOMATED COUNT: 15.5 %
EST. GFR  (AFRICAN AMERICAN): >60 ML/MIN/1.73 M^2
EST. GFR  (NON AFRICAN AMERICAN): >60 ML/MIN/1.73 M^2
GLUCOSE SERPL-MCNC: 59 MG/DL
HCT VFR BLD AUTO: 40 %
HGB BLD-MCNC: 13.1 G/DL
INR PPP: 1
LYMPHOCYTES # BLD AUTO: 2 K/UL
LYMPHOCYTES NFR BLD: 24.3 %
MCH RBC QN AUTO: 30.2 PG
MCHC RBC AUTO-ENTMCNC: 32.8 G/DL
MCV RBC AUTO: 92 FL
MONOCYTES # BLD AUTO: 0.6 K/UL
MONOCYTES NFR BLD: 7.7 %
NEUTROPHILS # BLD AUTO: 5.3 K/UL
NEUTROPHILS NFR BLD: 65.5 %
PLATELET # BLD AUTO: 262 K/UL
PMV BLD AUTO: 9.9 FL
POTASSIUM SERPL-SCNC: 3.8 MMOL/L
PROT SERPL-MCNC: 7.5 G/DL
PROTHROMBIN TIME: 10 SEC
RBC # BLD AUTO: 4.34 M/UL
SODIUM SERPL-SCNC: 143 MMOL/L
TROPONIN I SERPL DL<=0.01 NG/ML-MCNC: <0.006 NG/ML
WBC # BLD AUTO: 8.02 K/UL

## 2018-05-08 PROCEDURE — 83880 ASSAY OF NATRIURETIC PEPTIDE: CPT

## 2018-05-08 PROCEDURE — 63600175 PHARM REV CODE 636 W HCPCS: Performed by: EMERGENCY MEDICINE

## 2018-05-08 PROCEDURE — 85025 COMPLETE CBC W/AUTO DIFF WBC: CPT

## 2018-05-08 PROCEDURE — 93010 ELECTROCARDIOGRAM REPORT: CPT | Mod: ,,, | Performed by: INTERNAL MEDICINE

## 2018-05-08 PROCEDURE — 96374 THER/PROPH/DIAG INJ IV PUSH: CPT

## 2018-05-08 PROCEDURE — 80053 COMPREHEN METABOLIC PANEL: CPT

## 2018-05-08 PROCEDURE — 84484 ASSAY OF TROPONIN QUANT: CPT

## 2018-05-08 PROCEDURE — 99284 EMERGENCY DEPT VISIT MOD MDM: CPT | Mod: 25

## 2018-05-08 PROCEDURE — 85610 PROTHROMBIN TIME: CPT

## 2018-05-08 PROCEDURE — 85730 THROMBOPLASTIN TIME PARTIAL: CPT

## 2018-05-08 PROCEDURE — 93005 ELECTROCARDIOGRAM TRACING: CPT

## 2018-05-08 RX ORDER — METHYLPREDNISOLONE SOD SUCC 125 MG
125 VIAL (EA) INJECTION
Status: COMPLETED | OUTPATIENT
Start: 2018-05-08 | End: 2018-05-08

## 2018-05-08 RX ORDER — DOXYCYCLINE 100 MG/1
100 CAPSULE ORAL 2 TIMES DAILY
Qty: 20 CAPSULE | Refills: 0 | Status: SHIPPED | OUTPATIENT
Start: 2018-05-08 | End: 2018-05-18

## 2018-05-08 RX ORDER — PREDNISONE 20 MG/1
40 TABLET ORAL DAILY
Qty: 10 TABLET | Refills: 0 | Status: SHIPPED | OUTPATIENT
Start: 2018-05-08 | End: 2018-05-13

## 2018-05-08 RX ORDER — ALBUTEROL SULFATE 90 UG/1
2 AEROSOL, METERED RESPIRATORY (INHALATION) EVERY 4 HOURS PRN
Qty: 1 INHALER | Refills: 0 | Status: SHIPPED | OUTPATIENT
Start: 2018-05-08 | End: 2020-03-06

## 2018-05-08 RX ADMIN — METHYLPREDNISOLONE SODIUM SUCCINATE 125 MG: 125 INJECTION, POWDER, FOR SOLUTION INTRAMUSCULAR; INTRAVENOUS at 09:05

## 2018-05-09 NOTE — ED PROVIDER NOTES
SCRIBE #1 NOTE: I, Enmanuel Shane, am scribing for, and in the presence of, Melissa Godwin MD. I have scribed the entire note.      History      Chief Complaint   Patient presents with    Shortness of Breath     report dx with [facility]NA in the last month reports still having shortness of breath symptoms        Review of patient's allergies indicates:  No Known Allergies     HPI   HPI    5/8/2018, 9:05 PM   History obtained from the patient      History of Present Illness: Valorie Monzon is a 47 y.o. female patient who presents to the Emergency Department for cough which onset gradually 2 weeks ago. Symptoms are constant and moderate in severity. No mitigating or exacerbating factors reported. Associated sxs include chest tightness, wheezing, and SOB. Patient denies any fever, chills, sneezing, congestion, stridor, choking, and all other sxs at this time. Prior tx includes mucinex w/ no improvement. Pt was dx and treated for pneumonia a month ago. Pt states that she recently quit smoking. No further complaints or concerns at this time.         Arrival mode: Personal vehicle     PCP: Karri Levy MD       Past Medical History:  Past Medical History:   Diagnosis Date    COPD (chronic obstructive pulmonary disease)     Hypertension     Kidney stone     Stroke     Tobacco use        Past Surgical History:  Past Surgical History:   Procedure Laterality Date    HYSTERECTOMY      KNEE ARTHROSCOPY W/ ACL RECONSTRUCTION Left     LITHOTRIPSY           Family History:  Family History   Problem Relation Age of Onset    Kidney disease Mother     Cancer Mother     Liver disease Mother     Breast cancer Mother        Social History:  Social History     Social History Main Topics    Smoking status: Former Smoker     Packs/day: 1.00     Types: Cigarettes     Quit date: 2/4/2018    Smokeless tobacco: Never Used      Comment: patches     Alcohol use No    Drug use: No    Sexual activity: Not Currently      "Partners: Male       ROS   Review of Systems   Constitutional: Negative for chills and fever.   HENT: Negative for congestion, sneezing and sore throat.    Respiratory: Positive for cough, chest tightness, shortness of breath and wheezing. Negative for choking and stridor.    Cardiovascular: Negative for chest pain.   Gastrointestinal: Negative for nausea.   Genitourinary: Negative for dysuria.   Musculoskeletal: Negative for back pain.   Skin: Negative for rash.   Neurological: Negative for weakness.   Hematological: Does not bruise/bleed easily.       Physical Exam      Initial Vitals [05/08/18 2055]   BP Pulse Resp Temp SpO2   (!) 149/88 99 (!) 22 97.9 °F (36.6 °C) 95 %      MAP       108.33          Physical Exam  Nursing Notes and Vital Signs Reviewed.  Constitutional: Patient is in no acute distress. Well-developed and well-nourished.  Head: Atraumatic. Normocephalic.  Eyes: PERRL. EOM intact. Conjunctivae are not pale. No scleral icterus.  ENT: Mucous membranes are moist. Oropharynx is clear and symmetric.    Neck: Supple. Full ROM. No lymphadenopathy.  Cardiovascular: Regular rate. Regular rhythm. No murmurs, rubs, or gallops. Distal pulses are 2+ and symmetric.  Pulmonary/Chest: No respiratory distress. Expiratory wheezing.   Abdominal: Soft and non-distended.  There is no tenderness.  No rebound, guarding, or rigidity.   Musculoskeletal: Moves all extremities. No obvious deformities. No edema. No calf tenderness.  Skin: Warm and dry.  Neurological:  Alert, awake, and appropriate.  Normal speech.  No acute focal neurological deficits are appreciated.  Psychiatric: Normal affect. Good eye contact. Appropriate in content.    ED Course    Procedures  ED Vital Signs:  Vitals:    05/08/18 2055 05/08/18 2127 05/08/18 2242   BP: (!) 149/88  135/85   Pulse: 99 87 89   Resp: (!) 22  20   Temp: 97.9 °F (36.6 °C)     TempSrc: Oral     SpO2: 95%  96%   Weight: 110.2 kg (243 lb 0.9 oz)     Height: 5' 4" (1.626 m)   "       Abnormal Lab Results:  Labs Reviewed   CBC W/ AUTO DIFFERENTIAL - Abnormal; Notable for the following:        Result Value    RDW 15.5 (*)     All other components within normal limits   COMPREHENSIVE METABOLIC PANEL - Abnormal; Notable for the following:     Glucose 59 (*)     All other components within normal limits   B-TYPE NATRIURETIC PEPTIDE - Abnormal; Notable for the following:      (*)     All other components within normal limits   PROTIME-INR   APTT   TROPONIN I        All Lab Results:  Results for orders placed or performed during the hospital encounter of 05/08/18   CBC auto differential   Result Value Ref Range    WBC 8.02 3.90 - 12.70 K/uL    RBC 4.34 4.00 - 5.40 M/uL    Hemoglobin 13.1 12.0 - 16.0 g/dL    Hematocrit 40.0 37.0 - 48.5 %    MCV 92 82 - 98 fL    MCH 30.2 27.0 - 31.0 pg    MCHC 32.8 32.0 - 36.0 g/dL    RDW 15.5 (H) 11.5 - 14.5 %    Platelets 262 150 - 350 K/uL    MPV 9.9 9.2 - 12.9 fL    Gran # (ANC) 5.3 1.8 - 7.7 K/uL    Lymph # 2.0 1.0 - 4.8 K/uL    Mono # 0.6 0.3 - 1.0 K/uL    Eos # 0.2 0.0 - 0.5 K/uL    Baso # 0.04 0.00 - 0.20 K/uL    Gran% 65.5 38.0 - 73.0 %    Lymph% 24.3 18.0 - 48.0 %    Mono% 7.7 4.0 - 15.0 %    Eosinophil% 2.0 0.0 - 8.0 %    Basophil% 0.5 0.0 - 1.9 %    Differential Method Automated    Comprehensive metabolic panel   Result Value Ref Range    Sodium 143 136 - 145 mmol/L    Potassium 3.8 3.5 - 5.1 mmol/L    Chloride 110 95 - 110 mmol/L    CO2 24 23 - 29 mmol/L    Glucose 59 (L) 70 - 110 mg/dL    BUN, Bld 15 6 - 20 mg/dL    Creatinine 1.0 0.5 - 1.4 mg/dL    Calcium 8.9 8.7 - 10.5 mg/dL    Total Protein 7.5 6.0 - 8.4 g/dL    Albumin 3.6 3.5 - 5.2 g/dL    Total Bilirubin 0.1 0.1 - 1.0 mg/dL    Alkaline Phosphatase 75 55 - 135 U/L    AST 32 10 - 40 U/L    ALT 22 10 - 44 U/L    Anion Gap 9 8 - 16 mmol/L    eGFR if African American >60 >60 mL/min/1.73 m^2    eGFR if non African American >60 >60 mL/min/1.73 m^2   Protime-INR   Result Value Ref Range     Prothrombin Time 10.0 9.0 - 12.5 sec    INR 1.0 0.8 - 1.2   APTT   Result Value Ref Range    aPTT 25.4 21.0 - 32.0 sec   Brain natriuretic peptide   Result Value Ref Range     (H) 0 - 99 pg/mL   Troponin I   Result Value Ref Range    Troponin I <0.006 0.000 - 0.026 ng/mL         Imaging Results:  Imaging Results          X-Ray Chest PA And Lateral (Final result)  Result time 05/08/18 21:59:30    Final result by Kd Parham MD (05/08/18 21:59:30)                 Impression:      No acute findings      Electronically signed by: Kd Parham MD  Date:    05/08/2018  Time:    21:59             Narrative:    EXAMINATION:  XR CHEST PA AND LATERAL    CLINICAL HISTORY:  Cough    COMPARISON:  04/07/2018 CT chest, 04/05/2018 chest x-ray    FINDINGS:  Unchanged borderline heart size.  No infiltrates seen.                                   The EKG was ordered, reviewed, and independently interpreted by the ED provider.  Interpretation time: 2121  Rate: 86 BPM  Rhythm: normal sinus rhythm  Interpretation: Low voltage QRS. No STEMI.  When compared to EKG performed 04/04/2018, there are no significant changes.             The Emergency Provider reviewed the vital signs and test results, which are outlined above.    ED Discussion     10:30 PM: Reassessed pt at this time.  Pt states her condition has improved at this time. Discussed with pt all pertinent ED information and results. Discussed pt dx and plan of tx. Gave pt all f/u and return to the ED instructions. All questions and concerns were addressed at this time. Pt expresses understanding of information and instructions, and is comfortable with plan to discharge. Pt is stable for discharge.    I discussed with patient and/or family/caretaker that evaluation in the ED does not suggest any emergent or life threatening medical conditions requiring immediate intervention beyond what was provided in the ED, and I believe patient is safe for discharge.  Regardless, an  unremarkable evaluation in the ED does not preclude the development or presence of a serious of life threatening condition. As such, patient was instructed to return immediately for any worsening or change in current symptoms.        ED Medication(s):  Medications   methylPREDNISolone sodium succinate injection 125 mg (125 mg Intravenous Given 5/8/18 2114)       Discharge Medication List as of 5/8/2018 10:25 PM      START taking these medications    Details   albuterol 90 mcg/actuation inhaler Inhale 2 puffs into the lungs every 4 (four) hours as needed for Wheezing., Starting Tue 5/8/2018, Until Wed 5/8/2019, Print      doxycycline (VIBRAMYCIN) 100 MG Cap Take 1 capsule (100 mg total) by mouth 2 (two) times daily., Starting Tue 5/8/2018, Until Fri 5/18/2018, Print      predniSONE (DELTASONE) 20 MG tablet Take 2 tablets (40 mg total) by mouth once daily., Starting Tue 5/8/2018, Until Sun 5/13/2018, Print             Follow-up Information     Karri Levy MD In 2 days.    Specialty:  Internal Medicine  Contact information:  [address] [address] [address]  [phone]             [facility]    Specialty:  Emergency Medicine  Why:  As needed, If symptoms worsen  Contact information:  [address] [facility] [address] [phone]                   Medical Decision Making    Medical Decision Making:   Clinical Tests:   Lab Tests: Ordered and Reviewed  Radiological Study: Ordered and Reviewed  Medical Tests: Ordered and Reviewed           Scribe Attestation:   Scribe #1: I performed the above scribed service and the documentation accurately describes the services I performed. I attest to the accuracy of the note.    Attending:   Physician Attestation Statement for Scribe #1: I, Melissa Godwin MD, personally performed the services described in this documentation, as scribed by Enmanuel Shane, in my presence, and it is both accurate  and complete.          Clinical Impression       ICD-10-CM ICD-9-CM   1. Dyspnea R06.00 786.09   2. Cough R05 786.2       Disposition:   Disposition: Discharged  Condition: Stable         Melissa Godwin MD  05/09/18 0554

## 2018-06-25 ENCOUNTER — HOSPITAL ENCOUNTER (EMERGENCY)
Facility: HOSPITAL | Age: 48
Discharge: HOME | End: 2018-06-26
Attending: EMERGENCY MEDICINE
Payer: MEDICAID

## 2018-06-25 DIAGNOSIS — M79.89 LEG SWELLING: ICD-10-CM

## 2018-06-25 DIAGNOSIS — R20.2 PARESTHESIA: ICD-10-CM

## 2018-06-25 DIAGNOSIS — R60.9 EDEMA: Primary | ICD-10-CM

## 2018-06-25 PROCEDURE — 93005 ELECTROCARDIOGRAM TRACING: CPT

## 2018-06-25 PROCEDURE — 99284 EMERGENCY DEPT VISIT MOD MDM: CPT | Mod: 25

## 2018-06-26 VITALS
BODY MASS INDEX: 44.41 KG/M2 | WEIGHT: 260.13 LBS | SYSTOLIC BLOOD PRESSURE: 106 MMHG | DIASTOLIC BLOOD PRESSURE: 60 MMHG | OXYGEN SATURATION: 98 % | TEMPERATURE: 99 F | RESPIRATION RATE: 23 BRPM | HEIGHT: 64 IN | HEART RATE: 83 BPM

## 2018-06-26 LAB
ALBUMIN SERPL BCP-MCNC: 3.4 G/DL
ALP SERPL-CCNC: 66 U/L
ALT SERPL W/O P-5'-P-CCNC: 8 U/L
AMPHET+METHAMPHET UR QL: NEGATIVE
ANION GAP SERPL CALC-SCNC: 10 MMOL/L
AST SERPL-CCNC: 18 U/L
BARBITURATES UR QL SCN>200 NG/ML: NEGATIVE
BASOPHILS # BLD AUTO: 0.06 K/UL
BASOPHILS NFR BLD: 1.1 %
BENZODIAZ UR QL SCN>200 NG/ML: NEGATIVE
BILIRUB SERPL-MCNC: 0.2 MG/DL
BILIRUB UR QL STRIP: NEGATIVE
BNP SERPL-MCNC: 276 PG/ML
BUN SERPL-MCNC: 9 MG/DL
BZE UR QL SCN: NEGATIVE
CALCIUM SERPL-MCNC: 8.4 MG/DL
CANNABINOIDS UR QL SCN: NEGATIVE
CHLORIDE SERPL-SCNC: 111 MMOL/L
CK MB SERPL-MCNC: 0.8 NG/ML
CK MB SERPL-RTO: 0.8 %
CK SERPL-CCNC: 99 U/L
CK SERPL-CCNC: 99 U/L
CLARITY UR: CLEAR
CO2 SERPL-SCNC: 21 MMOL/L
COLOR UR: YELLOW
CREAT SERPL-MCNC: 0.8 MG/DL
CREAT UR-MCNC: 107.5 MG/DL
DIFFERENTIAL METHOD: ABNORMAL
EOSINOPHIL # BLD AUTO: 0.2 K/UL
EOSINOPHIL NFR BLD: 3.1 %
ERYTHROCYTE [DISTWIDTH] IN BLOOD BY AUTOMATED COUNT: 15.4 %
EST. GFR  (AFRICAN AMERICAN): >60 ML/MIN/1.73 M^2
EST. GFR  (NON AFRICAN AMERICAN): >60 ML/MIN/1.73 M^2
GLUCOSE SERPL-MCNC: 93 MG/DL
GLUCOSE UR QL STRIP: NEGATIVE
HCT VFR BLD AUTO: 39.3 %
HGB BLD-MCNC: 12.5 G/DL
HGB UR QL STRIP: NEGATIVE
KETONES UR QL STRIP: NEGATIVE
LEUKOCYTE ESTERASE UR QL STRIP: NEGATIVE
LYMPHOCYTES # BLD AUTO: 1.4 K/UL
LYMPHOCYTES NFR BLD: 24.5 %
MAGNESIUM SERPL-MCNC: 1.8 MG/DL
MCH RBC QN AUTO: 29.4 PG
MCHC RBC AUTO-ENTMCNC: 31.8 G/DL
MCV RBC AUTO: 93 FL
METHADONE UR QL SCN>300 NG/ML: NEGATIVE
MONOCYTES # BLD AUTO: 0.4 K/UL
MONOCYTES NFR BLD: 7 %
NEUTROPHILS # BLD AUTO: 3.6 K/UL
NEUTROPHILS NFR BLD: 64.3 %
NITRITE UR QL STRIP: NEGATIVE
OPIATES UR QL SCN: NORMAL
PCP UR QL SCN>25 NG/ML: NEGATIVE
PH UR STRIP: 6 [PH] (ref 5–8)
PHOSPHATE SERPL-MCNC: 2.7 MG/DL
PLATELET # BLD AUTO: 242 K/UL
PMV BLD AUTO: 10.6 FL
POTASSIUM SERPL-SCNC: 3.7 MMOL/L
PROT SERPL-MCNC: 6.4 G/DL
PROT UR QL STRIP: NEGATIVE
RBC # BLD AUTO: 4.25 M/UL
SODIUM SERPL-SCNC: 142 MMOL/L
SP GR UR STRIP: 1.02 (ref 1–1.03)
TOXICOLOGY INFORMATION: NORMAL
TROPONIN I SERPL DL<=0.01 NG/ML-MCNC: <0.006 NG/ML
TSH SERPL DL<=0.005 MIU/L-ACNC: 1.73 UIU/ML
URN SPEC COLLECT METH UR: ABNORMAL
UROBILINOGEN UR STRIP-ACNC: ABNORMAL EU/DL
WBC # BLD AUTO: 5.56 K/UL

## 2018-06-26 PROCEDURE — 96374 THER/PROPH/DIAG INJ IV PUSH: CPT

## 2018-06-26 PROCEDURE — 83735 ASSAY OF MAGNESIUM: CPT

## 2018-06-26 PROCEDURE — 84100 ASSAY OF PHOSPHORUS: CPT

## 2018-06-26 PROCEDURE — 84443 ASSAY THYROID STIM HORMONE: CPT

## 2018-06-26 PROCEDURE — 82553 CREATINE MB FRACTION: CPT

## 2018-06-26 PROCEDURE — 83880 ASSAY OF NATRIURETIC PEPTIDE: CPT

## 2018-06-26 PROCEDURE — 80053 COMPREHEN METABOLIC PANEL: CPT

## 2018-06-26 PROCEDURE — 93010 ELECTROCARDIOGRAM REPORT: CPT | Mod: ,,, | Performed by: INTERNAL MEDICINE

## 2018-06-26 PROCEDURE — 63600175 PHARM REV CODE 636 W HCPCS: Performed by: EMERGENCY MEDICINE

## 2018-06-26 PROCEDURE — 84484 ASSAY OF TROPONIN QUANT: CPT

## 2018-06-26 PROCEDURE — 81003 URINALYSIS AUTO W/O SCOPE: CPT | Mod: 59

## 2018-06-26 PROCEDURE — 82550 ASSAY OF CK (CPK): CPT

## 2018-06-26 PROCEDURE — 85025 COMPLETE CBC W/AUTO DIFF WBC: CPT

## 2018-06-26 PROCEDURE — 80307 DRUG TEST PRSMV CHEM ANLYZR: CPT

## 2018-06-26 RX ORDER — FUROSEMIDE 10 MG/ML
40 INJECTION INTRAMUSCULAR; INTRAVENOUS
Status: COMPLETED | OUTPATIENT
Start: 2018-06-26 | End: 2018-06-26

## 2018-06-26 RX ADMIN — FUROSEMIDE 40 MG: 10 INJECTION, SOLUTION INTRAMUSCULAR; INTRAVENOUS at 01:06

## 2018-06-26 NOTE — ED PROVIDER NOTES
SCRIBE #1 NOTE: I, Jess Nathan, am scribing for, and in the presence of, Armaan Milian MD. I have scribed the entire note.         History      Chief Complaint   Patient presents with    Leg Swelling     Bilateral; +weakness +tingling       Review of patient's allergies indicates:  No Known Allergies     HPI   HPI    6/25/2018, 11:29 PM   History obtained from the patient      History of Present Illness: Valorie Monzon is a 48 y.o. female patient with a PMHx of COPD, HTN, Stroke who presents to the Emergency Department for BLE edema which onset gradually PTA. Symptoms are constant and moderate in severity. No mitigating or exacerbating factors reported. Associated sxs include BLE pain, lower back pain, BUE/bilat feet paresthesias, difficulty ambulating secondary to pain. Pt also reports multiple falls recently. Patient denies any fever, chills, SOB, CP, palpitations, n/v, abd pain, incontinence, saddle anesthesia, syncope, head trayma, neck pain, and all other sxs at this time. No further complaints or concerns at this time.       Arrival mode: Personal vehicle     PCP: Karri Levy MD       Past Medical History:  Past Medical History:   Diagnosis Date    COPD (chronic obstructive pulmonary disease)     Depression     GERD (gastroesophageal reflux disease)     Hypercholesteremia     Hypertension     Kidney stone     Stroke     Tobacco use        Past Surgical History:  Past Surgical History:   Procedure Laterality Date    HYSTERECTOMY      KNEE ARTHROSCOPY W/ ACL RECONSTRUCTION Left     LITHOTRIPSY           Family History:  Family History   Problem Relation Age of Onset    Kidney disease Mother     Cancer Mother     Liver disease Mother     Breast cancer Mother        Social History:  Social History     Social History Main Topics    Smoking status: Former Smoker     Packs/day: 1.00     Types: Cigarettes     Quit date: 2/4/2018    Smokeless tobacco: Never Used      Comment:  patches     Alcohol use No    Drug use: No    Sexual activity: Not Currently     Partners: Male       ROS   Review of Systems   Constitutional: Negative for chills and fever.   HENT: Negative for sore throat.    Respiratory: Negative for cough and shortness of breath.    Cardiovascular: Positive for leg swelling (BLE). Negative for chest pain and palpitations.   Gastrointestinal: Negative for abdominal pain, nausea and vomiting.   Genitourinary: Negative for dysuria.   Musculoskeletal: Positive for back pain (lower), gait problem (secondary to pain) and myalgias (BLE). Negative for neck pain.   Skin: Negative for rash.   Neurological: Positive for numbness (BUE, bilat feet). Negative for dizziness, syncope, weakness and headaches.        (-) head trauma  (-) incontinence  (-) saddle anesthesia   Hematological: Does not bruise/bleed easily.   All other systems reviewed and are negative.    Physical Exam      Initial Vitals [06/25/18 2241]   BP Pulse Resp Temp SpO2   (!) 145/91 96 (!) 22 98.8 °F (37.1 °C) 97 %      MAP       --          Physical Exam  Nursing Notes and Vital Signs Reviewed.  Constitutional: Patient is in no acute distress. Well-developed and well-nourished.  Head: Atraumatic. Normocephalic.  Eyes: PERRL. EOM intact. Conjunctivae are not pale. No scleral icterus.  ENT: Mucous membranes are moist. Oropharynx is clear and symmetric.    Cardiovascular: Regular rate. Regular rhythm. No murmurs, rubs, or gallops. Distal pulses are 2+ and symmetric.  Pulmonary/Chest: No respiratory distress. Clear to auscultation bilaterally. No wheezing or rales.  Abdominal: Soft and non-distended.  There is no tenderness.  No rebound, guarding, or rigidity.  Musculoskeletal: Moves all extremities. No obvious deformities. Trace BLE edema. No calf tenderness.  Neck: Supple. No cervical midline bony tenderness, deformities, or step-offs.   Back: No tenderness. No midline bony tenderness, deformities, or step-offs of the  "T-spine or L-spine. Skin appears normal without abrasions or bruising. No erythema, induration, or fluctuance.   Neurological: Awake and alert. Appropriate for age. negative straight leg raise bilaterally. No strength deficit; equal and 5/5 in bilateral upper and lower extremities. No light touch sensory deficit. DTRs 2+ and equal. No acute focal neurological deficits noted.  Skin: Warm and dry.  Psychiatric: Normal affect. Good eye contact. Appropriate in content.    ED Course    Procedures  ED Vital Signs:  Vitals:    06/25/18 2241 06/26/18 0106 06/26/18 0201   BP: (!) 145/91 112/74 106/60   Pulse: 96 86 83   Resp: (!) 22 (!) 25 (!) 23   Temp: 98.8 °F (37.1 °C) 98.1 °F (36.7 °C) 98.6 °F (37 °C)   TempSrc: Oral Oral Oral   SpO2: 97% 99% 98%   Weight: 118 kg (260 lb 2.3 oz)     Height: 5' 4" (1.626 m)         Abnormal Lab Results:  Labs Reviewed   CBC W/ AUTO DIFFERENTIAL - Abnormal; Notable for the following:        Result Value    MCHC 31.8 (*)     RDW 15.4 (*)     All other components within normal limits   B-TYPE NATRIURETIC PEPTIDE - Abnormal; Notable for the following:      (*)     All other components within normal limits   COMPREHENSIVE METABOLIC PANEL - Abnormal; Notable for the following:     Chloride 111 (*)     CO2 21 (*)     Calcium 8.4 (*)     Albumin 3.4 (*)     ALT 8 (*)     All other components within normal limits   URINALYSIS - Abnormal; Notable for the following:     Urobilinogen, UA 2.0-3.0 (*)     All other components within normal limits   TROPONIN I   TSH   MAGNESIUM   PHOSPHORUS   CK   CK-MB   DRUG SCREEN PANEL, URINE EMERGENCY        All Lab Results:  Results for orders placed or performed during the hospital encounter of 06/25/18   CBC auto differential   Result Value Ref Range    WBC 5.56 3.90 - 12.70 K/uL    RBC 4.25 4.00 - 5.40 M/uL    Hemoglobin 12.5 12.0 - 16.0 g/dL    Hematocrit 39.3 37.0 - 48.5 %    MCV 93 82 - 98 fL    MCH 29.4 27.0 - 31.0 pg    MCHC 31.8 (L) 32.0 - 36.0 " g/dL    RDW 15.4 (H) 11.5 - 14.5 %    Platelets 242 150 - 350 K/uL    MPV 10.6 9.2 - 12.9 fL    Gran # (ANC) 3.6 1.8 - 7.7 K/uL    Lymph # 1.4 1.0 - 4.8 K/uL    Mono # 0.4 0.3 - 1.0 K/uL    Eos # 0.2 0.0 - 0.5 K/uL    Baso # 0.06 0.00 - 0.20 K/uL    Gran% 64.3 38.0 - 73.0 %    Lymph% 24.5 18.0 - 48.0 %    Mono% 7.0 4.0 - 15.0 %    Eosinophil% 3.1 0.0 - 8.0 %    Basophil% 1.1 0.0 - 1.9 %    Differential Method Automated    Brain natriuretic peptide   Result Value Ref Range     (H) 0 - 99 pg/mL   Comprehensive metabolic panel   Result Value Ref Range    Sodium 142 136 - 145 mmol/L    Potassium 3.7 3.5 - 5.1 mmol/L    Chloride 111 (H) 95 - 110 mmol/L    CO2 21 (L) 23 - 29 mmol/L    Glucose 93 70 - 110 mg/dL    BUN, Bld 9 6 - 20 mg/dL    Creatinine 0.8 0.5 - 1.4 mg/dL    Calcium 8.4 (L) 8.7 - 10.5 mg/dL    Total Protein 6.4 6.0 - 8.4 g/dL    Albumin 3.4 (L) 3.5 - 5.2 g/dL    Total Bilirubin 0.2 0.1 - 1.0 mg/dL    Alkaline Phosphatase 66 55 - 135 U/L    AST 18 10 - 40 U/L    ALT 8 (L) 10 - 44 U/L    Anion Gap 10 8 - 16 mmol/L    eGFR if African American >60 >60 mL/min/1.73 m^2    eGFR if non African American >60 >60 mL/min/1.73 m^2   Troponin I   Result Value Ref Range    Troponin I <0.006 0.000 - 0.026 ng/mL   TSH   Result Value Ref Range    TSH 1.725 0.400 - 4.000 uIU/mL   Urinalysis   Result Value Ref Range    Specimen UA Urine, Clean Catch     Color, UA Yellow Yellow, Straw, Ana    Appearance, UA Clear Clear    pH, UA 6.0 5.0 - 8.0    Specific Gravity, UA 1.020 1.005 - 1.030    Protein, UA Negative Negative    Glucose, UA Negative Negative    Ketones, UA Negative Negative    Bilirubin (UA) Negative Negative    Occult Blood UA Negative Negative    Nitrite, UA Negative Negative    Urobilinogen, UA 2.0-3.0 (A) <2.0 EU/dL    Leukocytes, UA Negative Negative   Magnesium   Result Value Ref Range    Magnesium 1.8 1.6 - 2.6 mg/dL   Phosphorus   Result Value Ref Range    Phosphorus 2.7 2.7 - 4.5 mg/dL   CK   Result  Value Ref Range    CPK 99 20 - 180 U/L   CK-MB   Result Value Ref Range    CPK 99 20 - 180 U/L    CPK MB 0.8 0.1 - 6.5 ng/mL    MB% 0.8 0.0 - 5.0 %   Drug screen panel, emergency   Result Value Ref Range    Benzodiazepines Negative     Methadone metabolites Negative     Cocaine (Metab.) Negative     Opiate Scrn, Ur Presumptive Positive     Barbiturate Screen, Ur Negative     Amphetamine Screen, Ur Negative     THC Negative     Phencyclidine Negative     Creatinine, Random Ur 107.5 15.0 - 325.0 mg/dL    Toxicology Information SEE COMMENT        Imaging Results:  Imaging Results          CT Lumbar Spine Without Contrast (Final result)  Result time 06/26/18 07:51:42    Final result by Kwabena Salvador MD (06/26/18 07:51:42)                 Impression:      No evidence of acute fracture.    Mild degenerative changes greatest at L4/5.    All CT scans at this facility use dose modulation, iterative reconstruction, and/or weight base dosing when appropriate to reduce radiation dose to as low as reasonably achievable.      Electronically signed by: Kwabena Salvador MD  Date:    06/26/2018  Time:    07:51             Narrative:    EXAMINATION:  CT LUMBAR SPINE WITHOUT CONTRAST    CLINICAL HISTORY:  Low Back Pain/ Neuropathy;    TECHNIQUE:  2.5 mm axial noncontrast CT images were obtained through the lumbar spine using soft tissue and bony algorithm .  Sagittal coronal reformats were also submitted for interpretation.    COMPARISON:  None    FINDINGS:  The lumbar vertebral bodies demonstrate adequate alignment.The vertebral body heights are well-maintained.No fractures are identified.No secondary evidence of fracture (such as star-osseous hematoma) is identified.    Mild degenerative changes are seen throughout the lower lumbar spine greatest at L4/5 with posterior disc bulge and facet arthropathy producing mild bilateral neural foraminal narrowing.  No evidence of significant canal stenosis.  Small posterior disc bulges are  seen at additional levels.  Mild degenerative changes at the bilateral SI joints    The remaining visualized paravertebral and intraabdominal structures demonstrate no pathology.                               X-Ray Chest 1 View (Final result)  Result time 06/26/18 07:48:53    Final result by Kwabena Salvador MD (06/26/18 07:48:53)                 Impression:      No acute process seen.      Electronically signed by: Kwabena Salvador MD  Date:    06/26/2018  Time:    07:48             Narrative:    EXAMINATION:  XR CHEST 1 VIEW    CLINICAL HISTORY:  Other specified soft tissue disorders    FINDINGS:  Single view of the chest.    Cardiac silhouette is borderline enlarged.  The lungs demonstrate no evidence of active disease.  No evidence of pleural effusion or pneumothorax.  Bones appear intact.                               1:13 AM: Per STAT radiology, pt's CT results: No evidence of acute Fx or malalignment. Mild degenerative changes. No high-grade stenosis.      The EKG was ordered, reviewed, and independently interpreted by the ED provider.  Interpretation time: 0002  Rate: 81 BPM  Rhythm: normal sinus rhythm  Interpretation: No acute ST changes. No STEMI.         The Emergency Provider reviewed the vital signs and test results, which are outlined above.    ED Discussion     1:49 AM: Reassessed pt at this time.  Pt states her condition has improved at this time. Discussed with pt all pertinent ED information and results. Discussed pt dx and plan of tx. Gave pt all f/u and return to the ED instructions. All questions and concerns were addressed at this time. Pt expresses understanding of information and instructions, and is comfortable with plan to discharge. Pt is stable for discharge.    I discussed with patient and/or family/caretaker that evaluation in the ED does not suggest any emergent or life threatening medical conditions requiring immediate intervention beyond what was provided in the ED, and I believe  patient is safe for discharge.  Regardless, an unremarkable evaluation in the ED does not preclude the development or presence of a serious of life threatening condition. As such, patient was instructed to return immediately for any worsening or change in current symptoms.      ED Medication(s):  Medications   furosemide injection 40 mg (40 mg Intravenous Given 6/26/18 0152)       Discharge Medication List as of 6/26/2018  1:54 AM          Follow-up Information     Karri Levy MD. Call in 1 day.    Specialty:  Internal Medicine  Contact information:  [address] [address] [address]  [phone]             [facility]    Specialty:  Emergency Medicine  Why:  If symptoms worsen  Contact information:  [address] [address]  [facility] [address] [phone]           Neurology. Call in 1 day.    Why:  As needed for paresthesias                   Medical Decision Making    Medical Decision Making:   Clinical Tests:   Lab Tests: Ordered and Reviewed  Radiological Study: Reviewed and Ordered  Medical Tests: Reviewed and Ordered           Scribe Attestation:   Scribe #1: I performed the above scribed service and the documentation accurately describes the services I performed. I attest to the accuracy of the note.    Attending:   Physician Attestation Statement for Scribe #1: I, Armaan Milian MD, personally performed the services described in this documentation, as scribed by Jess Evans, in my presence, and it is both accurate and complete.          Clinical Impression       ICD-10-CM ICD-9-CM   1. Edema R60.9 782.3   2. Leg swelling M79.89 729.81   3. Paresthesia R20.2 782.0       Disposition:   Disposition: Discharged  Condition: Stable         Armaan Milian MD  06/26/18 2036

## 2018-06-26 NOTE — ED NOTES
Discharge instructions explained to patient with verbalization of understanding of instructions.  Pt escorted to registration desk per wc by RN. Discharge paperwork given to registration for completion of discharge.

## 2018-06-26 NOTE — ED NOTES
Armband checked, patient verified. Verified by spelling and stated name on armband along with .   LOC: The patient is awake, alert and aware of environment with an appropriate affect, the patient is oriented x 3 and speaking appropriately.  APPEARANCE: Patient resting comfortably and in no acute distress, patient is clean and well groomed  SKIN: The skin is warm and dry, color consistent with ethnicity, patient has normal skin turgor and moist mucus membranes, no breakdown or bruising noted.   MUSCULOSKELETAL: Patient moving all extremities to command. José Miguel legs with 1+ edema noted from knees down.  Pt states has been having increased swelling.   RESPIRATORY: Airway is open and patent, respirations are regular, even and non labored.  CARDIAC: Patient has a normal rate, no periphreal edema noted, capillary refill < 3 seconds.  ABDOMEN: Soft and non tender to palpation.

## 2018-07-23 ENCOUNTER — HOSPITAL ENCOUNTER (EMERGENCY)
Facility: HOSPITAL | Age: 48
Discharge: HOME | End: 2018-07-23
Payer: MEDICAID

## 2018-07-23 VITALS
TEMPERATURE: 98 F | HEIGHT: 64 IN | HEART RATE: 94 BPM | WEIGHT: 253.75 LBS | RESPIRATION RATE: 18 BRPM | BODY MASS INDEX: 43.32 KG/M2 | DIASTOLIC BLOOD PRESSURE: 91 MMHG | SYSTOLIC BLOOD PRESSURE: 133 MMHG | OXYGEN SATURATION: 96 %

## 2018-07-23 DIAGNOSIS — M79.672 ACUTE FOOT PAIN, LEFT: ICD-10-CM

## 2018-07-23 DIAGNOSIS — R60.0 LEG EDEMA, LEFT: ICD-10-CM

## 2018-07-23 PROCEDURE — 99284 EMERGENCY DEPT VISIT MOD MDM: CPT | Mod: 25

## 2018-07-23 NOTE — ED PROVIDER NOTES
History      Chief Complaint   Patient presents with    Foot Pain     pt states she has left foot pain; unable to weight bear on left foot; states she may have twisted it        Review of patient's allergies indicates:  No Known Allergies     HPI   HPI    7/23/2018, 3:50 PM   History obtained from the patient       History of Present Illness: Valorie Monzon is a 48 y.o. female patient who presents to the Emergency Department for left foot pain for 2 days.  Denies specific injury.  Pain is mainly to heel.  LLE edema.  She takes lasix daily.  Denies cp, sob, tobacco use, fever. Symptoms are moderate in severity.     No further complaints or concerns at this time.           PCP: Karri Levy MD       Past Medical History:  Past Medical History:   Diagnosis Date    COPD (chronic obstructive pulmonary disease)     Depression     GERD (gastroesophageal reflux disease)     Hypercholesteremia     Hypertension     Kidney stone     Stroke     Tobacco use          Past Surgical History:  Past Surgical History:   Procedure Laterality Date    HYSTERECTOMY      KNEE ARTHROSCOPY W/ ACL RECONSTRUCTION Left     LITHOTRIPSY             Family History:  Family History   Problem Relation Age of Onset    Kidney disease Mother     Cancer Mother     Liver disease Mother     Breast cancer Mother            Social History:  Social History     Social History Main Topics    Smoking status: Former Smoker     Packs/day: 1.00     Types: Cigarettes     Quit date: 2/4/2018    Smokeless tobacco: Never Used      Comment: patches     Alcohol use No    Drug use: No    Sexual activity: Not Currently     Partners: Male       ROS     Review of Systems   Constitutional: Negative for chills and fever.   HENT: Negative for sore throat.    Respiratory: Negative for shortness of breath.    Cardiovascular: Positive for leg swelling. Negative for chest pain.   Gastrointestinal: Negative for nausea and vomiting.  "  Genitourinary: Negative for dysuria.   Musculoskeletal: Negative for back pain.   Skin: Negative for rash and wound.   Neurological: Negative for weakness.   Hematological: Does not bruise/bleed easily.   All other systems reviewed and are negative.      Physical Exam      Initial Vitals [07/23/18 1414]   BP Pulse Resp Temp SpO2   (!) 133/91 94 18 98.2 °F (36.8 °C) 96 %      MAP       --         Physical Exam  Vital signs and nursing notes reviewed.  Constitutional: Patient is in NAD. Awake and alert. Well-developed and well-nourished.  Head: Atraumatic. Normocephalic.  Eyes: PERRL. EOM intact. Conjunctivae nl. No scleral icterus.  ENT: Mucous membranes are moist. Oropharynx is clear.  Neck: Supple. No JVD. No lymphadenopathy.  No meningismus  Cardiovascular: Regular rate and rhythm. No murmurs, rubs, or gallops. Distal pulses are 2+ and symmetric.  Pulmonary/Chest: No respiratory distress. Clear to auscultation bilaterally. No wheezing, rales, or rhonchi.  Abdominal: Soft. Non-distended. No TTP. No rebound, guarding, or rigidity. Good bowel sounds.  Genitourinary: No CVA tenderness  Musculoskeletal: Moves all extremities. LLE edema. TTP to plantar surface of left foot.  No wound.  No erythema, or heat to leg ankle or foot.  1+dp.    Skin: Warm and dry.  Neurological: Awake and alert. No acute focal neurological deficits are appreciated.  Psychiatric: Normal affect. Good eye contact. Appropriate in content.      ED Course          Procedures  ED Vital Signs:  Vitals:    07/23/18 1414   BP: (!) 133/91   Pulse: 94   Resp: 18   Temp: 98.2 °F (36.8 °C)   TempSrc: Tympanic   SpO2: 96%   Weight: 115.1 kg (253 lb 12 oz)   Height: 5' 4" (1.626 m)                 Imaging Results:  Imaging Results          US Lower Extremity Veins Left (Final result)  Result time 07/23/18 15:32:14    Final result by Kwabena Salvador MD (07/23/18 15:32:14)                 Impression:      Negative exam.  No evidence of deep venous thrombosis " in the left lower extremity.      Electronically signed by: Kwabena Salvador MD  Date:    07/23/2018  Time:    15:32             Narrative:    EXAMINATION:  US LOWER EXTREMITY VEINS LEFT    CLINICAL HISTORY:  Localized edema    TECHNIQUE:  The left lower extremity deep venous system was imaged by ultrasound from the groin to the upper calf.  Veins were analyzed with transducer compression, color doppler, and venous waveform analysis.  This exam does not exclude clot in the deep muscular veins.    COMPARISON:  None    FINDINGS:  The left  common femoral vein, femoral vein and popliteal vein exhibit spontaneous flow which varies with respiration. These vessels are normally compressible. There is no evidence of venous thrombus. No venous reflux is demonstrated in the popliteal vein. The common femoral waveforms are bilaterally symmetric. Flow in the anterior and posterior tibial and peroneal veins is documented with color Doppler.                               X-Ray Foot Complete Left (Final result)  Result time 07/23/18 15:18:36    Final result by CALIN Galvan Sr., MD (07/23/18 15:18:36)                 Impression:      1. There is moderate prominence of the soft tissue thickness in the dorsum of the left foot. This is characteristic of a cellulitis or soft tissue contusion.  2. There is a small spur at the site of attachment of the plantar fascia to the calcaneus.      Electronically signed by: Kd Galvan MD  Date:    07/23/2018  Time:    15:18             Narrative:    EXAMINATION:  XR FOOT COMPLETE 3 VIEW LEFT    CLINICAL HISTORY:  Pain in left foot    COMPARISON:  None    FINDINGS:  There is no fracture. There is no dislocation.  There is a small spur at the site of attachment of the plantar fascia to the calcaneus.  There is moderate prominence of the soft tissue thickness in the dorsum of the left foot.                                   The Emergency Provider reviewed the vital signs and test results,  which are outlined above.    ED Discussion             Medication(s) given in the ER:  Medications - No data to display        Follow-up Information     Karri Levy MD In 1 day.    Specialty:  Internal Medicine  Contact information:  [address] [address] [address]  [phone]                       New Prescriptions    No medications on file          Medical Decision Making      Pt has appt with her pcp tomorrow.  Discussed probable plantar fasciitis.  Watch for any signs of infection.      All findings were reviewed with the patient/family in detail.   All remaining questions and concerns were addressed at that time.  Patient/family has been counseled regarding the need for follow-up as well as the indication to return to the emergency room should new or worrisome developments occur.        MDM               Clinical Impression:        ICD-10-CM ICD-9-CM   1. Leg edema, left R60.0 782.3   2. Acute foot pain, left M79.672 729.5             Nina Shelton PA-C  07/23/18 155

## 2018-09-02 ENCOUNTER — HOSPITAL ENCOUNTER (EMERGENCY)
Facility: HOSPITAL | Age: 48
Discharge: HOME | End: 2018-09-03
Attending: EMERGENCY MEDICINE
Payer: MEDICAID

## 2018-09-02 DIAGNOSIS — R35.0 URINARY FREQUENCY: ICD-10-CM

## 2018-09-02 DIAGNOSIS — R10.9 FLANK PAIN: Primary | ICD-10-CM

## 2018-09-02 LAB
ALBUMIN SERPL BCP-MCNC: 3.6 G/DL
ALP SERPL-CCNC: 93 U/L
ALT SERPL W/O P-5'-P-CCNC: 21 U/L
ANION GAP SERPL CALC-SCNC: 10 MMOL/L
AST SERPL-CCNC: 26 U/L
BASOPHILS # BLD AUTO: 0.04 K/UL
BASOPHILS NFR BLD: 0.5 %
BILIRUB SERPL-MCNC: 0.4 MG/DL
BILIRUB UR QL STRIP: NEGATIVE
BILIRUB UR QL STRIP: NEGATIVE
BUN SERPL-MCNC: 15 MG/DL
CALCIUM SERPL-MCNC: 8.8 MG/DL
CHLORIDE SERPL-SCNC: 104 MMOL/L
CLARITY UR: CLEAR
CLARITY UR: CLEAR
CO2 SERPL-SCNC: 22 MMOL/L
COLOR UR: YELLOW
COLOR UR: YELLOW
CREAT SERPL-MCNC: 1.2 MG/DL
DIFFERENTIAL METHOD: NORMAL
EOSINOPHIL # BLD AUTO: 0.2 K/UL
EOSINOPHIL NFR BLD: 2.4 %
ERYTHROCYTE [DISTWIDTH] IN BLOOD BY AUTOMATED COUNT: 14.1 %
EST. GFR  (AFRICAN AMERICAN): >60 ML/MIN/1.73 M^2
EST. GFR  (NON AFRICAN AMERICAN): 54 ML/MIN/1.73 M^2
GLUCOSE SERPL-MCNC: 109 MG/DL
GLUCOSE UR QL STRIP: NEGATIVE
GLUCOSE UR QL STRIP: NEGATIVE
HCT VFR BLD AUTO: 41.9 %
HGB BLD-MCNC: 13.8 G/DL
HGB UR QL STRIP: NEGATIVE
HGB UR QL STRIP: NEGATIVE
KETONES UR QL STRIP: ABNORMAL
KETONES UR QL STRIP: ABNORMAL
LEUKOCYTE ESTERASE UR QL STRIP: NEGATIVE
LEUKOCYTE ESTERASE UR QL STRIP: NEGATIVE
LYMPHOCYTES # BLD AUTO: 1.6 K/UL
LYMPHOCYTES NFR BLD: 20.8 %
MCH RBC QN AUTO: 29.9 PG
MCHC RBC AUTO-ENTMCNC: 32.9 G/DL
MCV RBC AUTO: 91 FL
MONOCYTES # BLD AUTO: 0.5 K/UL
MONOCYTES NFR BLD: 6.9 %
NEUTROPHILS # BLD AUTO: 5.5 K/UL
NEUTROPHILS NFR BLD: 69.4 %
NITRITE UR QL STRIP: NEGATIVE
NITRITE UR QL STRIP: NEGATIVE
PH UR STRIP: 6 [PH] (ref 5–8)
PH UR STRIP: 6 [PH] (ref 5–8)
PLATELET # BLD AUTO: 226 K/UL
PMV BLD AUTO: 11.4 FL
POTASSIUM SERPL-SCNC: 4.2 MMOL/L
PROT SERPL-MCNC: 6.9 G/DL
PROT UR QL STRIP: NEGATIVE
PROT UR QL STRIP: NEGATIVE
RBC # BLD AUTO: 4.62 M/UL
SODIUM SERPL-SCNC: 136 MMOL/L
SP GR UR STRIP: 1.01 (ref 1–1.03)
SP GR UR STRIP: 1.01 (ref 1–1.03)
URN SPEC COLLECT METH UR: ABNORMAL
URN SPEC COLLECT METH UR: ABNORMAL
UROBILINOGEN UR STRIP-ACNC: NEGATIVE EU/DL
UROBILINOGEN UR STRIP-ACNC: NEGATIVE EU/DL
WBC # BLD AUTO: 7.87 K/UL

## 2018-09-02 PROCEDURE — 63600175 PHARM REV CODE 636 W HCPCS: Performed by: EMERGENCY MEDICINE

## 2018-09-02 PROCEDURE — 99284 EMERGENCY DEPT VISIT MOD MDM: CPT | Mod: 25

## 2018-09-02 PROCEDURE — 85025 COMPLETE CBC W/AUTO DIFF WBC: CPT

## 2018-09-02 PROCEDURE — 81003 URINALYSIS AUTO W/O SCOPE: CPT

## 2018-09-02 PROCEDURE — 96361 HYDRATE IV INFUSION ADD-ON: CPT

## 2018-09-02 PROCEDURE — 25000003 PHARM REV CODE 250: Performed by: EMERGENCY MEDICINE

## 2018-09-02 PROCEDURE — 80053 COMPREHEN METABOLIC PANEL: CPT

## 2018-09-02 PROCEDURE — 96374 THER/PROPH/DIAG INJ IV PUSH: CPT

## 2018-09-02 RX ORDER — KETOROLAC TROMETHAMINE 30 MG/ML
15 INJECTION, SOLUTION INTRAMUSCULAR; INTRAVENOUS
Status: COMPLETED | OUTPATIENT
Start: 2018-09-02 | End: 2018-09-02

## 2018-09-02 RX ADMIN — SODIUM CHLORIDE 1000 ML: 0.9 INJECTION, SOLUTION INTRAVENOUS at 10:09

## 2018-09-02 RX ADMIN — KETOROLAC TROMETHAMINE 15 MG: 30 INJECTION, SOLUTION INTRAMUSCULAR at 10:09

## 2018-09-03 VITALS
HEIGHT: 64 IN | HEART RATE: 75 BPM | BODY MASS INDEX: 44.08 KG/M2 | DIASTOLIC BLOOD PRESSURE: 90 MMHG | WEIGHT: 258.19 LBS | RESPIRATION RATE: 18 BRPM | SYSTOLIC BLOOD PRESSURE: 120 MMHG | TEMPERATURE: 98 F | OXYGEN SATURATION: 99 %

## 2018-09-03 PROCEDURE — 25000003 PHARM REV CODE 250: Performed by: EMERGENCY MEDICINE

## 2018-09-03 RX ORDER — TRAMADOL HYDROCHLORIDE 50 MG/1
50 TABLET ORAL EVERY 6 HOURS PRN
Qty: 12 TABLET | Refills: 0 | Status: SHIPPED | OUTPATIENT
Start: 2018-09-03 | End: 2018-09-13

## 2018-09-03 RX ORDER — ONDANSETRON 4 MG/1
4 TABLET, ORALLY DISINTEGRATING ORAL EVERY 8 HOURS PRN
Qty: 10 TABLET | Refills: 0 | Status: ON HOLD | OUTPATIENT
Start: 2018-09-03 | End: 2018-09-18

## 2018-09-03 RX ADMIN — DICYCLOMINE HYDROCHLORIDE 50 ML: 10 SOLUTION ORAL at 12:09

## 2018-09-03 NOTE — ED PROVIDER NOTES
SCRIBE #1 NOTE: I, Salima Barroso, am scribing for, and in the presence of, Pierce Clarke MD. I have scribed the entire note.      History      Chief Complaint   Patient presents with    Flank Pain     with dysuria x 1 wk       Review of patient's allergies indicates:  No Known Allergies     HPI   HPI    2018, 9:51 PM   History obtained from the patient      History of Present Illness: Valorie Monzon is a 48 y.o. female patient with a PMHx of kidney stones, GERD, HTN, and COPD who presents to the Emergency Department for flank pain radiating to R suprapubic area which onset gradually a few days ago. Symptoms are constant and moderate in severity. No mitigating or exacerbating factors reported. Associated sxs include dysuria and urinary urgency/frequency. Patient denies any fever, chills, constipation, hematochezia, hematuria, n/v/d, and all other sxs at this time. No further complaints or concerns at this time.         Arrival mode: Personal vehicle      PCP: Karri Levy MD       Past Medical History:  Past Medical History:   Diagnosis Date    COPD (chronic obstructive pulmonary disease)     Depression     GERD (gastroesophageal reflux disease)     Hypercholesteremia     Hypertension     Kidney stone     Stroke     Tobacco use        Past Surgical History:  Past Surgical History:   Procedure Laterality Date    HYSTERECTOMY      KNEE ARTHROSCOPY W/ ACL RECONSTRUCTION Left     LITHOTRIPSY           Family History:  Family History   Problem Relation Age of Onset    Kidney disease Mother     Cancer Mother     Liver disease Mother     Breast cancer Mother        Social History:  Social History     Tobacco Use    Smoking status: Former Smoker     Packs/day: 1.00     Types: Cigarettes     Last attempt to quit: 2018     Years since quittin.7    Smokeless tobacco: Never Used    Tobacco comment: patches    Substance and Sexual Activity    Alcohol use: No    Drug use: No     Sexual activity: Not Currently     Partners: Male       ROS   Review of Systems   Constitutional: Negative for chills and fever.   HENT: Negative for sore throat.    Respiratory: Negative for shortness of breath.    Cardiovascular: Negative for chest pain.   Gastrointestinal: Negative for blood in stool, constipation, diarrhea, nausea and vomiting.   Genitourinary: Positive for flank pain (R, radiating to R suprapubic region), frequency and urgency. Negative for dysuria and hematuria.   Musculoskeletal: Negative for back pain.   Skin: Negative for rash.   Neurological: Negative for weakness.   Hematological: Does not bruise/bleed easily.   All other systems reviewed and are negative.      Physical Exam      Initial Vitals [09/02/18 2131]   BP Pulse Resp Temp SpO2   104/80 100 20 98 °F (36.7 °C) 95 %      MAP       --          Physical Exam  Nursing Notes and Vital Signs Reviewed.  Constitutional: Patient is in no acute distress. Well-developed and well-nourished.  Head: Atraumatic. Normocephalic.  Eyes: PERRL. EOM intact. Conjunctivae are not pale. No scleral icterus.  ENT: Mucous membranes are moist. Oropharynx is clear and symmetric.    Neck: Supple. Full ROM. No lymphadenopathy.  Cardiovascular: Regular rate. Regular rhythm. No murmurs, rubs, or gallops. Distal pulses are 2+ and symmetric.  Pulmonary/Chest: No respiratory distress. Clear to auscultation bilaterally. No wheezing or rales.  Abdominal: Soft and non-distended.  There is no tenderness.  No rebound, guarding, or rigidity.   Genitourinary: No CVA tenderness  Musculoskeletal: Moves all extremities. No obvious deformities. No edema.   Skin: Warm and dry.  Neurological:  Alert, awake, and appropriate.  Normal speech.  No acute focal neurological deficits are appreciated.  Psychiatric: Normal affect. Good eye contact. Appropriate in content.    ED Course    Procedures  ED Vital Signs:  Vitals:    09/02/18 2131 09/02/18 2220 09/02/18 2300 09/03/18 0020   BP:  "104/80 (!) 124/97 109/67 (!) 120/90   Pulse: 100 82 75 75   Resp: 20   18   Temp: 98 °F (36.7 °C)   98 °F (36.7 °C)   TempSrc: Oral   Oral   SpO2: 95% 95% 97% 99%   Weight: 117.1 kg (258 lb 2.5 oz)      Height: 5' 4" (1.626 m)          Abnormal Lab Results:  Labs Reviewed   URINALYSIS - Abnormal; Notable for the following components:       Result Value    Ketones, UA Trace (*)     All other components within normal limits   COMPREHENSIVE METABOLIC PANEL - Abnormal; Notable for the following components:    CO2 22 (*)     eGFR if non  54 (*)     All other components within normal limits   URINALYSIS, REFLEX TO URINE CULTURE - Abnormal; Notable for the following components:    Ketones, UA Trace (*)     All other components within normal limits   CBC W/ AUTO DIFFERENTIAL   URINALYSIS, REFLEX TO URINE CULTURE        All Lab Results:  Results for orders placed or performed during the hospital encounter of 09/02/18   Urinalysis Clean Catch   Result Value Ref Range    Specimen UA Urine, Clean Catch     Color, UA Yellow Yellow, Straw, Ana    Appearance, UA Clear Clear    pH, UA 6.0 5.0 - 8.0    Specific Gravity, UA 1.010 1.005 - 1.030    Protein, UA Negative Negative    Glucose, UA Negative Negative    Ketones, UA Trace (A) Negative    Bilirubin (UA) Negative Negative    Occult Blood UA Negative Negative    Nitrite, UA Negative Negative    Urobilinogen, UA Negative <2.0 EU/dL    Leukocytes, UA Negative Negative   CBC auto differential   Result Value Ref Range    WBC 7.87 3.90 - 12.70 K/uL    RBC 4.62 4.00 - 5.40 M/uL    Hemoglobin 13.8 12.0 - 16.0 g/dL    Hematocrit 41.9 37.0 - 48.5 %    MCV 91 82 - 98 fL    MCH 29.9 27.0 - 31.0 pg    MCHC 32.9 32.0 - 36.0 g/dL    RDW 14.1 11.5 - 14.5 %    Platelets 226 150 - 350 K/uL    MPV 11.4 9.2 - 12.9 fL    Gran # (ANC) 5.5 1.8 - 7.7 K/uL    Lymph # 1.6 1.0 - 4.8 K/uL    Mono # 0.5 0.3 - 1.0 K/uL    Eos # 0.2 0.0 - 0.5 K/uL    Baso # 0.04 0.00 - 0.20 K/uL    Gran% " 69.4 38.0 - 73.0 %    Lymph% 20.8 18.0 - 48.0 %    Mono% 6.9 4.0 - 15.0 %    Eosinophil% 2.4 0.0 - 8.0 %    Basophil% 0.5 0.0 - 1.9 %    Differential Method Automated    Comprehensive metabolic panel   Result Value Ref Range    Sodium 136 136 - 145 mmol/L    Potassium 4.2 3.5 - 5.1 mmol/L    Chloride 104 95 - 110 mmol/L    CO2 22 (L) 23 - 29 mmol/L    Glucose 109 70 - 110 mg/dL    BUN, Bld 15 6 - 20 mg/dL    Creatinine 1.2 0.5 - 1.4 mg/dL    Calcium 8.8 8.7 - 10.5 mg/dL    Total Protein 6.9 6.0 - 8.4 g/dL    Albumin 3.6 3.5 - 5.2 g/dL    Total Bilirubin 0.4 0.1 - 1.0 mg/dL    Alkaline Phosphatase 93 55 - 135 U/L    AST 26 10 - 40 U/L    ALT 21 10 - 44 U/L    Anion Gap 10 8 - 16 mmol/L    eGFR if African American >60 >60 mL/min/1.73 m^2    eGFR if non African American 54 (A) >60 mL/min/1.73 m^2   Urinalysis, Reflex to Urine Culture   Result Value Ref Range    Specimen UA Urine, Clean Catch     Color, UA Yellow Yellow, Straw, Ana    Appearance, UA Clear Clear    pH, UA 6.0 5.0 - 8.0    Specific Gravity, UA 1.010 1.005 - 1.030    Protein, UA Negative Negative    Glucose, UA Negative Negative    Ketones, UA Trace (A) Negative    Bilirubin (UA) Negative Negative    Occult Blood UA Negative Negative    Nitrite, UA Negative Negative    Urobilinogen, UA Negative <2.0 EU/dL    Leukocytes, UA Negative Negative         Imaging Results:  Imaging Results          CT Renal Stone Study ABD Pelvis WO (Final result)  Result time 09/02/18 22:39:10    Final result by Conor Angelo Jr., MD (09/02/18 22:39:10)                 Impression:      No acute findings to explain the patient's symptoms.    All CT scans at this facility are performed  using dose modulation techniques as appropriate to performed exam including the following:  automated exposure control; adjustment of mA and/or kV according to the patients size (this includes techniques or standardized protocols for targeted exams where dose is matched to indication/reason for  exam: i.e. extremities or head);  iterative reconstruction technique.      Electronically signed by: Conor Angelo Jr., MD  Date:    09/02/2018  Time:    22:39             Narrative:    EXAMINATION:  CT RENAL STONE STUDY ABD PELVIS WO    CLINICAL HISTORY:  Flank pain, stone disease suspected;    TECHNIQUE:  Low dose axial images, sagittal and coronal reformations were obtained from the lung bases to the pubic symphysis.  Contrast was not administered.    COMPARISON:  None    FINDINGS:  Heart: Normal in size. No pericardial effusion.    Lung Bases: Well aerated, without consolidation or pleural fluid.  Dependent atelectasis.    Liver: Normal in size and attenuation, with no focal hepatic lesions.    Gallbladder: No calcified gallstones.    Bile Ducts: No evidence of dilated ducts.    Pancreas: No mass or peripancreatic fat stranding.    Spleen: Unremarkable.    Adrenals: Unremarkable.    Kidneys/ Ureters: No renal calculus or obstructive uropathy.    Bladder: No evidence of wall thickening.    GI Tract/Mesentery: No evidence of bowel obstruction or inflammation.  Appendix not visualized.    Peritoneal Space: No ascites. No free air.    Retroperitoneum: No significant adenopathy.    Abdominal wall: Tiny fat containing periumbilical hernia.    Vasculature: No significant atherosclerosis or aneurysm.    Bones: No acute fracture.                                        The Emergency Provider reviewed the vital signs and test results, which are outlined above.    ED Discussion     12:29 AM: Reassessed pt at this time.  Pt states her condition has improved at this time. Discussed with pt all pertinent ED information and results. Discussed pt dx and plan of tx. Gave pt all f/u and return to the ED instructions. All questions and concerns were addressed at this time. Pt expresses understanding of information and instructions, and is comfortable with plan to discharge. Pt is stable for discharge.    I discussed with patient  and/or family/caretaker that evaluation in the ED does not suggest any emergent or life threatening medical conditions requiring immediate intervention beyond what was provided in the ED, and I believe patient is safe for discharge.  Regardless, an unremarkable evaluation in the ED does not preclude the development or presence of a serious of life threatening condition. As such, patient was instructed to return immediately for any worsening or change in current symptoms.      ED Medication(s):  Medications   sodium chloride 0.9% bolus 1,000 mL (0 mLs Intravenous Stopped 9/3/18 0008)   ketorolac injection 15 mg (15 mg Intravenous Given 9/2/18 2215)   GI cocktail (mylanta 30 mL, lidocaine 2 % viscous 10 mL, dicyclomine 10 mL) 50 mL (50 mLs Oral Given 9/3/18 0007)       This SmartLink is deprecated. Use [facility] instead to display the medication list for a patient.    Follow-up Information     Karri Levy MD. Schedule an appointment as soon as possible for a visit in 3 days.    Specialty:  Internal Medicine  Contact information:  [address] [address] [address]  [phone]             Go to  [facility].    Specialty:  Emergency Medicine  Why:  As needed, If symptoms worsen  Contact information:  [address] [facility] [address] [phone]                   Medical Decision Making    Medical Decision Making:   Clinical Tests:   Lab Tests: Ordered and Reviewed  Radiological Study: Ordered and Reviewed           Scribe Attestation:   Scribe #1: I performed the above scribed service and the documentation accurately describes the services I performed. I attest to the accuracy of the note.    Attending:   Physician Attestation Statement for Scribe #1: I, Pierce Clarke MD, personally performed the services described in this documentation, as scribed by Salima Barroso, in my presence, and it is both accurate and complete.           Clinical Impression       ICD-10-CM ICD-9-CM   1. Flank pain R10.9 789.09   2. Urinary frequency R35.0 788.41       Disposition:   Disposition: Discharged  Condition: Stable         Pierce Clarke MD  10/26/18 5735

## 2018-09-16 ENCOUNTER — HOSPITAL ENCOUNTER (EMERGENCY)
Facility: HOSPITAL | Age: 48
Discharge: HOME | End: 2018-09-16
Attending: EMERGENCY MEDICINE
Payer: MEDICAID

## 2018-09-16 VITALS
DIASTOLIC BLOOD PRESSURE: 70 MMHG | WEIGHT: 266 LBS | OXYGEN SATURATION: 100 % | TEMPERATURE: 98 F | HEART RATE: 80 BPM | RESPIRATION RATE: 20 BRPM | HEIGHT: 64 IN | BODY MASS INDEX: 45.41 KG/M2 | SYSTOLIC BLOOD PRESSURE: 124 MMHG

## 2018-09-16 DIAGNOSIS — R60.9 EDEMA: ICD-10-CM

## 2018-09-16 DIAGNOSIS — T14.8XXA BRUISING: ICD-10-CM

## 2018-09-16 DIAGNOSIS — L03.115 CELLULITIS OF RIGHT LOWER LEG: Primary | ICD-10-CM

## 2018-09-16 LAB
ALBUMIN SERPL BCP-MCNC: 3.4 G/DL
ALP SERPL-CCNC: 118 U/L
ALT SERPL W/O P-5'-P-CCNC: 18 U/L
ANION GAP SERPL CALC-SCNC: 12 MMOL/L
AST SERPL-CCNC: 20 U/L
BASOPHILS # BLD AUTO: 0.03 K/UL
BASOPHILS NFR BLD: 0.2 %
BILIRUB SERPL-MCNC: 0.3 MG/DL
BUN SERPL-MCNC: 13 MG/DL
CALCIUM SERPL-MCNC: 8.6 MG/DL
CHLORIDE SERPL-SCNC: 104 MMOL/L
CO2 SERPL-SCNC: 23 MMOL/L
CREAT SERPL-MCNC: 0.8 MG/DL
CRP SERPL-MCNC: 123.1 MG/L
DIFFERENTIAL METHOD: ABNORMAL
EOSINOPHIL # BLD AUTO: 0.3 K/UL
EOSINOPHIL NFR BLD: 2.6 %
ERYTHROCYTE [DISTWIDTH] IN BLOOD BY AUTOMATED COUNT: 14.2 %
ERYTHROCYTE [SEDIMENTATION RATE] IN BLOOD BY WESTERGREN METHOD: 39 MM/HR
EST. GFR  (AFRICAN AMERICAN): >60 ML/MIN/1.73 M^2
EST. GFR  (NON AFRICAN AMERICAN): >60 ML/MIN/1.73 M^2
GLUCOSE SERPL-MCNC: 106 MG/DL
HCT VFR BLD AUTO: 36.9 %
HGB BLD-MCNC: 12 G/DL
LYMPHOCYTES # BLD AUTO: 1.3 K/UL
LYMPHOCYTES NFR BLD: 10.5 %
MCH RBC QN AUTO: 29.7 PG
MCHC RBC AUTO-ENTMCNC: 32.5 G/DL
MCV RBC AUTO: 91 FL
MONOCYTES # BLD AUTO: 1 K/UL
MONOCYTES NFR BLD: 8 %
NEUTROPHILS # BLD AUTO: 9.5 K/UL
NEUTROPHILS NFR BLD: 78.7 %
PLATELET # BLD AUTO: 264 K/UL
PMV BLD AUTO: 10.5 FL
POTASSIUM SERPL-SCNC: 3.6 MMOL/L
PROT SERPL-MCNC: 6.9 G/DL
RBC # BLD AUTO: 4.04 M/UL
SODIUM SERPL-SCNC: 139 MMOL/L
URATE SERPL-MCNC: 4.1 MG/DL
WBC # BLD AUTO: 12.12 K/UL

## 2018-09-16 PROCEDURE — 85651 RBC SED RATE NONAUTOMATED: CPT

## 2018-09-16 PROCEDURE — 99284 EMERGENCY DEPT VISIT MOD MDM: CPT

## 2018-09-16 PROCEDURE — 84550 ASSAY OF BLOOD/URIC ACID: CPT

## 2018-09-16 PROCEDURE — 25000003 PHARM REV CODE 250: Performed by: EMERGENCY MEDICINE

## 2018-09-16 PROCEDURE — 80053 COMPREHEN METABOLIC PANEL: CPT

## 2018-09-16 PROCEDURE — 85025 COMPLETE CBC W/AUTO DIFF WBC: CPT

## 2018-09-16 PROCEDURE — 96360 HYDRATION IV INFUSION INIT: CPT

## 2018-09-16 PROCEDURE — 86140 C-REACTIVE PROTEIN: CPT

## 2018-09-16 RX ORDER — HYDROCODONE BITARTRATE AND ACETAMINOPHEN 5; 325 MG/1; MG/1
1 TABLET ORAL EVERY 6 HOURS PRN
Qty: 9 TABLET | Refills: 0 | Status: ON HOLD | OUTPATIENT
Start: 2018-09-16 | End: 2018-09-18 | Stop reason: SDUPTHER

## 2018-09-16 RX ORDER — SULFAMETHOXAZOLE AND TRIMETHOPRIM 800; 160 MG/1; MG/1
1 TABLET ORAL 2 TIMES DAILY
Qty: 14 TABLET | Refills: 0 | Status: ON HOLD | OUTPATIENT
Start: 2018-09-16 | End: 2018-09-18 | Stop reason: HOSPADM

## 2018-09-16 RX ORDER — SULFAMETHOXAZOLE AND TRIMETHOPRIM 800; 160 MG/1; MG/1
1 TABLET ORAL
Status: DISCONTINUED | OUTPATIENT
Start: 2018-09-16 | End: 2018-09-16 | Stop reason: HOSPADM

## 2018-09-16 RX ORDER — HYDROCODONE BITARTRATE AND ACETAMINOPHEN 5; 325 MG/1; MG/1
1 TABLET ORAL
Status: COMPLETED | OUTPATIENT
Start: 2018-09-16 | End: 2018-09-16

## 2018-09-16 RX ADMIN — HYDROCODONE BITARTRATE AND ACETAMINOPHEN 1 TABLET: 5; 325 TABLET ORAL at 08:09

## 2018-09-16 RX ADMIN — SODIUM CHLORIDE 1000 ML: 0.9 INJECTION, SOLUTION INTRAVENOUS at 08:09

## 2018-09-17 ENCOUNTER — NURSE TRIAGE (OUTPATIENT)
Dept: ADMINISTRATIVE | Facility: CLINIC | Age: 48
End: 2018-09-17

## 2018-09-17 ENCOUNTER — HOSPITAL ENCOUNTER (OUTPATIENT)
Facility: HOSPITAL | Age: 48
Discharge: HOME | End: 2018-09-18
Attending: EMERGENCY MEDICINE | Admitting: HOSPITALIST
Payer: MEDICAID

## 2018-09-17 DIAGNOSIS — L03.90 CELLULITIS: Primary | ICD-10-CM
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
## 2018-09-17 PROCEDURE — 99285 EMERGENCY DEPT VISIT HI MDM: CPT | Mod: 25

## 2018-09-17 PROCEDURE — 96374 THER/PROPH/DIAG INJ IV PUSH: CPT

## 2018-09-17 PROCEDURE — 96375 TX/PRO/DX INJ NEW DRUG ADDON: CPT

## 2018-09-17 RX ORDER — VANCOMYCIN HCL IN 5 % DEXTROSE 1G/250ML
1000 PLASTIC BAG, INJECTION (ML) INTRAVENOUS
Status: COMPLETED | OUTPATIENT
Start: 2018-09-18 | End: 2018-09-18

## 2018-09-17 NOTE — ED PROVIDER NOTES
"SCRIBE #1 NOTE: I, Gladis Roscoe/Nina Haddad, am scribing for, and in the presence of, Joshua Avelar Jr., MD. I have scribed the entire note.      History      Chief Complaint   Patient presents with    Cellulitis     Pt states, "I hit my foot on the bed and now the skin is red and hurting and it's going up my leg."       Review of patient's allergies indicates:  No Known Allergies     HPI   HPI    9/16/2018, 8:17 PM   History obtained from the patient      History of Present Illness: Valorie Monzon is a 48 y.o. female patient with PMHx of CVA, seizures, and cardiomegaly who presents to the Emergency Department for evaluation of constant, 8/10 distal RLE pain with associated swelling and erythema. Pt states she fell out of her bed while she was sleeping and hit her RLE on her nightstand 8 days ago. Pt notes her pain radiates around her entire R ankle and is exacerbated with ambulation. Sxs are constant and moderate in severity. Patient denies any head injury/trauma, LOC, HA, dizziness, back pain, neck pain, knee pain, hip pain, abd pain, CP, SOB, or any other sxs at this time. Pt denies any tobacco or drug abuse. No further complaints or concerns at this time.       Arrival mode: Personal vehicle    PCP: Karri Levy MD       Past Medical History:  Past Medical History:   Diagnosis Date    COPD (chronic obstructive pulmonary disease)     Depression     GERD (gastroesophageal reflux disease)     Hypercholesteremia     Hypertension     Kidney stone     Stroke     Tobacco use        Past Surgical History:  Past Surgical History:   Procedure Laterality Date    HYSTERECTOMY      KNEE ARTHROSCOPY W/ ACL RECONSTRUCTION Left     LITHOTRIPSY           Family History:  Family History   Problem Relation Age of Onset    Kidney disease Mother     Cancer Mother     Liver disease Mother     Breast cancer Mother        Social History:  Social History     Tobacco Use    Smoking status: Former " Smoker     Packs/day: 1.00     Types: Cigarettes     Last attempt to quit: 2018     Years since quittin.6    Smokeless tobacco: Never Used    Tobacco comment: patches    Substance and Sexual Activity    Alcohol use: No    Drug use: No    Sexual activity: Not Currently     Partners: Male       ROS   Review of Systems   Constitutional: Negative for activity change, appetite change, chills, diaphoresis, fatigue and fever.   HENT: Negative for congestion, ear pain, nosebleeds, rhinorrhea, sinus pain, sore throat and trouble swallowing.         (-) head injury/trauma   Eyes: Negative for pain and discharge.   Respiratory: Negative for cough, chest tightness, shortness of breath, wheezing and stridor.    Cardiovascular: Negative for chest pain, palpitations and leg swelling.   Gastrointestinal: Negative for abdominal distention, abdominal pain, blood in stool, constipation, diarrhea, nausea and vomiting.   Genitourinary: Negative for difficulty urinating, dysuria, flank pain, frequency, hematuria and urgency.   Musculoskeletal: Negative for back pain and neck pain.        (+) RLE pain  (+) RLE swelling  (-) knee pain, hip pain   Skin: Positive for color change (erythema to distal RLE). Negative for pallor, rash and wound.   Neurological: Negative for dizziness, syncope, weakness, light-headedness, numbness and headaches.   Hematological: Does not bruise/bleed easily.   Psychiatric/Behavioral: Negative for confusion and self-injury.   All other systems reviewed and are negative.    Physical Exam      Initial Vitals [18 1811]   BP Pulse Resp Temp SpO2   99/74 85 20 97.5 °F (36.4 °C) 96 %      MAP       --          Physical Exam  Nursing Notes and Vital Signs Reviewed.  Constitutional: Patient is in no acute distress. Well-developed and well-nourished.  Head: Atraumatic. Normocephalic.  Eyes: PERRL. EOM intact. Conjunctivae are not pale. No scleral icterus.  ENT: Mucous membranes are moist. Oropharynx is  "clear and symmetric.    Neck: Supple. Full ROM. No lymphadenopathy.  Cardiovascular: Regular rate. Regular rhythm. No murmurs, rubs, or gallops. Distal pulses are 2+ and symmetric. Normal pulses in the foot.  Maintain cap refill.  Pulmonary/Chest: No respiratory distress. Clear to auscultation bilaterally. No wheezing or rales.  Abdominal: Soft and non-distended.  There is no tenderness.  No rebound, guarding, or rigidity. Good bowel sounds.  Genitourinary: No CVA tenderness  Musculoskeletal: Moves all extremities.   Right Ankle:  Bruising to medial aspect of the foot. There is tenderness and swelling from ankle to distal 3rd of the calf. Erythema on ankle. Healed wound on medial ankle.   Skin: Warm and dry. Cellulitis as described in musculoskeletal exam.  Neurological:  Alert, awake, and appropriate.  Normal speech.  No acute focal neurological deficits are appreciated. Normal sensation in the foot.  Psychiatric: Normal affect. Good eye contact. Appropriate in content.      ED Course    Procedures  ED Vital Signs:  Vitals:    09/16/18 1811 09/16/18 2011 09/16/18 2019 09/16/18 2102   BP: 99/74  (!) 148/82 135/81   Pulse: 85 79 86 81   Resp: 20  (!) 22 20   Temp: 97.5 °F (36.4 °C)      TempSrc: Oral      SpO2: 96%  95% 99%   Weight: 120.7 kg (265 lb 15.8 oz)      Height: 5' 4" (1.626 m)       09/16/18 2146   BP: 124/70   Pulse: 80   Resp:    Temp:    TempSrc: Oral   SpO2: 100%   Weight:    Height:        Abnormal Lab Results:  Labs Reviewed   CBC W/ AUTO DIFFERENTIAL - Abnormal; Notable for the following components:       Result Value    Hematocrit 36.9 (*)     Gran # (ANC) 9.5 (*)     Gran% 78.7 (*)     Lymph% 10.5 (*)     All other components within normal limits   COMPREHENSIVE METABOLIC PANEL - Abnormal; Notable for the following components:    Calcium 8.6 (*)     Albumin 3.4 (*)     All other components within normal limits   C-REACTIVE PROTEIN - Abnormal; Notable for the following components:    .1 (*)  "    All other components within normal limits   SEDIMENTATION RATE - Abnormal; Notable for the following components:    Sed Rate 39 (*)     All other components within normal limits   URIC ACID        All Lab Results:  Results for orders placed or performed during the hospital encounter of 09/16/18   CBC auto differential   Result Value Ref Range    WBC 12.12 3.90 - 12.70 K/uL    RBC 4.04 4.00 - 5.40 M/uL    Hemoglobin 12.0 12.0 - 16.0 g/dL    Hematocrit 36.9 (L) 37.0 - 48.5 %    MCV 91 82 - 98 fL    MCH 29.7 27.0 - 31.0 pg    MCHC 32.5 32.0 - 36.0 g/dL    RDW 14.2 11.5 - 14.5 %    Platelets 264 150 - 350 K/uL    MPV 10.5 9.2 - 12.9 fL    Gran # (ANC) 9.5 (H) 1.8 - 7.7 K/uL    Lymph # 1.3 1.0 - 4.8 K/uL    Mono # 1.0 0.3 - 1.0 K/uL    Eos # 0.3 0.0 - 0.5 K/uL    Baso # 0.03 0.00 - 0.20 K/uL    Gran% 78.7 (H) 38.0 - 73.0 %    Lymph% 10.5 (L) 18.0 - 48.0 %    Mono% 8.0 4.0 - 15.0 %    Eosinophil% 2.6 0.0 - 8.0 %    Basophil% 0.2 0.0 - 1.9 %    Differential Method Automated    Comprehensive metabolic panel   Result Value Ref Range    Sodium 139 136 - 145 mmol/L    Potassium 3.6 3.5 - 5.1 mmol/L    Chloride 104 95 - 110 mmol/L    CO2 23 23 - 29 mmol/L    Glucose 106 70 - 110 mg/dL    BUN, Bld 13 6 - 20 mg/dL    Creatinine 0.8 0.5 - 1.4 mg/dL    Calcium 8.6 (L) 8.7 - 10.5 mg/dL    Total Protein 6.9 6.0 - 8.4 g/dL    Albumin 3.4 (L) 3.5 - 5.2 g/dL    Total Bilirubin 0.3 0.1 - 1.0 mg/dL    Alkaline Phosphatase 118 55 - 135 U/L    AST 20 10 - 40 U/L    ALT 18 10 - 44 U/L    Anion Gap 12 8 - 16 mmol/L    eGFR if African American >60 >60 mL/min/1.73 m^2    eGFR if non African American >60 >60 mL/min/1.73 m^2   Uric acid   Result Value Ref Range    Uric Acid 4.1 2.4 - 5.7 mg/dL   C-reactive protein   Result Value Ref Range    .1 (H) 0.0 - 8.2 mg/L   Sedimentation rate   Result Value Ref Range    Sed Rate 39 (H) 0 - 20 mm/Hr         Imaging Results:  Imaging Results          X-Ray Tibia Fibula 2 View Right (Final result)   Result time 09/16/18 21:07:49    Final result by Vel León MD (09/16/18 21:07:49)                 Impression:      1.  Negative for acute process involving the visualized osseous structures.    2.  Moderate edema within the distal thigh and calf soft tissues.    3.  Incidental findings as noted above.      Electronically signed by: Vel León MD  Date:    09/16/2018  Time:    21:07             Narrative:    EXAMINATION:  XR TIBIA FIBULA 2 VIEW RIGHT    CLINICAL HISTORY:  Other injury of unspecified body region, initial encounter    TECHNIQUE:  AP and lateral views of the right tibia and fibula were performed.    COMPARISON:  None.    FINDINGS:  Three images are provided for review.  Knee and ankle joints are grossly well maintained.  Negative for fracture or dislocation.  Plantar and Achilles calcaneal spurs.    Moderate edema is present within the distal thigh and calf soft tissues.                               X-Ray Foot Complete Right (Final result)  Result time 09/16/18 21:09:05    Final result by Vel León MD (09/16/18 21:09:05)                 Impression:      1.  Negative for acute process involving the osseous structures.    2. Soft tissue swelling of the dorsum of the foot.    3.  Incidental findings as noted above.      Electronically signed by: Vel León MD  Date:    09/16/2018  Time:    21:09             Narrative:    EXAMINATION:  XR FOOT COMPLETE 3 VIEW RIGHT    CLINICAL HISTORY:  . Edema, unspecified    TECHNIQUE:  AP, lateral, and oblique views of the right foot were performed.    COMPARISON:  None    FINDINGS:  Plantar and Achilles calcaneal spurs.  Soft tissue swelling of the dorsum of the foot.  Joint spaces are well maintained.  Negative for fracture or dislocation.                                        The Emergency Provider reviewed the vital signs and test results, which are outlined above.    ED Discussion     9:33 PM: Reassessed pt at this time. Patient has uncomplicated  cellulitis to the right lower leg.  It is fairly mild on exam though it is tender.  There is no abscess noted. I discussed all findings with the patient.  I will treat with oral antibiotics as outpatient for close follow-up primary care provider tomorrow.  Provided pain medications as well.  Recommend the patient return the emergency department if her symptoms worsen in any way or for any fevers or for any worsening rash spreading of the rash. She and her daughter verbalized understanding and agreement with all instructions and seems reliable.    I discussed with patient and/or family/caretaker that evaluation in the ED does not suggest any emergent or life threatening medical conditions requiring immediate intervention beyond what was provided in the ED, and I believe patient is safe for discharge.  Regardless, an unremarkable evaluation in the ED does not preclude the development or presence of a serious of life threatening condition. As such, patient was instructed to return immediately for any worsening or change in current symptoms.        ED Medication(s):  Medications   sulfamethoxazole-trimethoprim 800-160mg per tablet 1 tablet (1 tablet Oral Not Given 9/16/18 2145)   sodium chloride 0.9% bolus 1,000 mL (0 mLs Intravenous Stopped 9/16/18 2145)   HYDROcodone-acetaminophen 5-325 mg per tablet 1 tablet (1 tablet Oral Given 9/16/18 2051)     Current Discharge Medication List      START taking these medications    Details   HYDROcodone-acetaminophen (NORCO) 5-325 mg per tablet Take 1 tablet by mouth every 6 (six) hours as needed for Pain.  Qty: 9 tablet, Refills: 0      sulfamethoxazole-trimethoprim 800-160mg (BACTRIM DS) 800-160 mg Tab Take 1 tablet by mouth 2 (two) times daily. for 7 days  Qty: 14 tablet, Refills: 0             Follow-up Information     Karri Levy MD In 1 day.    Specialty:  Internal Medicine  Contact information:  [id number] [address]  [address]  [phone]                     Medical Decision Making    Medical Decision Making:   Clinical Tests:   Lab Tests: Ordered and Reviewed  Radiological Study: Ordered and Reviewed           Scribe Attestation:   Scribe #1: I performed the above scribed service and the documentation accurately describes the services I performed. I attest to the accuracy of the note.    Attending:   Physician Attestation Statement for Scribe #1: I, Joshua Avelar Jr., MD, personally performed the services described in this documentation, as scribed by Gladis Malhotra/Nina Haddad, in my presence, and it is both accurate and complete.          Clinical Impression       ICD-10-CM ICD-9-CM   1. Cellulitis of right lower leg L03.115 682.6   2. Edema R60.9 782.3   3. Bruising T14.8XXA 924.9       Disposition:   Disposition: Discharged  Condition: Stable         Joshua Avelar Jr., MD  09/16/18 3237       Joshua Avelar Jr., MD  09/16/18 1169

## 2018-09-17 NOTE — ED NOTES
Pt reports falling out of the bed and hitting her forehead and leg. States that now her leg has a swollen, red hardened area above her right ankle

## 2018-09-17 NOTE — DISCHARGE INSTRUCTIONS
Bactrim as prescribed for infection.  Ibuprofen for pain.  Norco for breakthrough pain.  Follow up with her primary care provider tomorrow for re-evaluation.  If symptoms worsen in any way, fevers, worsening swelling, drainage from the leg, or any problems questions or concerns, return immediately for re-evaluation and possible admission.

## 2018-09-18 VITALS
WEIGHT: 266.13 LBS | BODY MASS INDEX: 45.43 KG/M2 | HEIGHT: 64 IN | DIASTOLIC BLOOD PRESSURE: 83 MMHG | RESPIRATION RATE: 18 BRPM | HEART RATE: 78 BPM | SYSTOLIC BLOOD PRESSURE: 127 MMHG | TEMPERATURE: 97 F | OXYGEN SATURATION: 97 %

## 2018-09-18 PROBLEM — L03.90 CELLULITIS: Status: ACTIVE | Noted: 2018-09-18

## 2018-09-18 LAB
ALBUMIN SERPL BCP-MCNC: 2.9 G/DL
ALBUMIN SERPL BCP-MCNC: 3.4 G/DL
ALP SERPL-CCNC: 140 U/L
ALP SERPL-CCNC: 167 U/L
ALT SERPL W/O P-5'-P-CCNC: 17 U/L
ALT SERPL W/O P-5'-P-CCNC: 21 U/L
AMPHET+METHAMPHET UR QL: NEGATIVE
ANION GAP SERPL CALC-SCNC: 10 MMOL/L
ANION GAP SERPL CALC-SCNC: 12 MMOL/L
AST SERPL-CCNC: 21 U/L
AST SERPL-CCNC: 38 U/L
BARBITURATES UR QL SCN>200 NG/ML: NEGATIVE
BASOPHILS # BLD AUTO: 0.03 K/UL
BASOPHILS # BLD AUTO: 0.04 K/UL
BASOPHILS NFR BLD: 0.3 %
BASOPHILS NFR BLD: 0.4 %
BENZODIAZ UR QL SCN>200 NG/ML: NORMAL
BILIRUB SERPL-MCNC: 0.4 MG/DL
BILIRUB SERPL-MCNC: 0.5 MG/DL
BILIRUB UR QL STRIP: NEGATIVE
BUN SERPL-MCNC: 6 MG/DL
BUN SERPL-MCNC: 6 MG/DL
BZE UR QL SCN: NEGATIVE
CALCIUM SERPL-MCNC: 7.8 MG/DL
CALCIUM SERPL-MCNC: 8.8 MG/DL
CANNABINOIDS UR QL SCN: NEGATIVE
CHLORIDE SERPL-SCNC: 103 MMOL/L
CHLORIDE SERPL-SCNC: 106 MMOL/L
CLARITY UR: CLEAR
CO2 SERPL-SCNC: 23 MMOL/L
CO2 SERPL-SCNC: 23 MMOL/L
COLOR UR: YELLOW
CREAT SERPL-MCNC: 0.8 MG/DL
CREAT SERPL-MCNC: 0.8 MG/DL
CREAT UR-MCNC: 76.2 MG/DL
CRP SERPL-MCNC: 157.78 MG/L
DIFFERENTIAL METHOD: ABNORMAL
DIFFERENTIAL METHOD: ABNORMAL
EOSINOPHIL # BLD AUTO: 0.3 K/UL
EOSINOPHIL # BLD AUTO: 0.3 K/UL
EOSINOPHIL NFR BLD: 2.6 %
EOSINOPHIL NFR BLD: 3.5 %
ERYTHROCYTE [DISTWIDTH] IN BLOOD BY AUTOMATED COUNT: 14.3 %
ERYTHROCYTE [DISTWIDTH] IN BLOOD BY AUTOMATED COUNT: 14.4 %
EST. GFR  (AFRICAN AMERICAN): >60 ML/MIN/1.73 M^2
EST. GFR  (AFRICAN AMERICAN): >60 ML/MIN/1.73 M^2
EST. GFR  (NON AFRICAN AMERICAN): >60 ML/MIN/1.73 M^2
EST. GFR  (NON AFRICAN AMERICAN): >60 ML/MIN/1.73 M^2
ESTIMATED AVG GLUCOSE: 97 MG/DL
GLUCOSE SERPL-MCNC: 132 MG/DL
GLUCOSE SERPL-MCNC: 93 MG/DL
GLUCOSE UR QL STRIP: NEGATIVE
HBA1C MFR BLD HPLC: 5 %
HCT VFR BLD AUTO: 34.1 %
HCT VFR BLD AUTO: 37.4 %
HGB BLD-MCNC: 11.1 G/DL
HGB BLD-MCNC: 12.3 G/DL
HGB UR QL STRIP: NEGATIVE
KETONES UR QL STRIP: NEGATIVE
LACTATE SERPL-SCNC: 1.7 MMOL/L
LEUKOCYTE ESTERASE UR QL STRIP: NEGATIVE
LYMPHOCYTES # BLD AUTO: 1.2 K/UL
LYMPHOCYTES # BLD AUTO: 1.2 K/UL
LYMPHOCYTES NFR BLD: 10.4 %
LYMPHOCYTES NFR BLD: 12.3 %
MAGNESIUM SERPL-MCNC: 1.7 MG/DL
MCH RBC QN AUTO: 29.7 PG
MCH RBC QN AUTO: 29.7 PG
MCHC RBC AUTO-ENTMCNC: 32.6 G/DL
MCHC RBC AUTO-ENTMCNC: 32.9 G/DL
MCV RBC AUTO: 90 FL
MCV RBC AUTO: 91 FL
METHADONE UR QL SCN>300 NG/ML: NEGATIVE
MONOCYTES # BLD AUTO: 0.6 K/UL
MONOCYTES # BLD AUTO: 0.8 K/UL
MONOCYTES NFR BLD: 6 %
MONOCYTES NFR BLD: 6.9 %
NEUTROPHILS # BLD AUTO: 7.4 K/UL
NEUTROPHILS # BLD AUTO: 9 K/UL
NEUTROPHILS NFR BLD: 77.8 %
NEUTROPHILS NFR BLD: 79.8 %
NITRITE UR QL STRIP: NEGATIVE
OPIATES UR QL SCN: NORMAL
PCP UR QL SCN>25 NG/ML: NEGATIVE
PH UR STRIP: 7 [PH] (ref 5–8)
PHOSPHATE SERPL-MCNC: 3 MG/DL
PLATELET # BLD AUTO: 253 K/UL
PLATELET # BLD AUTO: 288 K/UL
PMV BLD AUTO: 10.4 FL
PMV BLD AUTO: 11.1 FL
POTASSIUM SERPL-SCNC: 3.1 MMOL/L
POTASSIUM SERPL-SCNC: 4.5 MMOL/L
PROCALCITONIN SERPL IA-MCNC: 0.19 NG/ML
PROT SERPL-MCNC: 6.2 G/DL
PROT SERPL-MCNC: 7.8 G/DL
PROT UR QL STRIP: NEGATIVE
RBC # BLD AUTO: 3.74 M/UL
RBC # BLD AUTO: 4.14 M/UL
SODIUM SERPL-SCNC: 138 MMOL/L
SODIUM SERPL-SCNC: 139 MMOL/L
SP GR UR STRIP: 1.01 (ref 1–1.03)
TOXICOLOGY INFORMATION: NORMAL
URN SPEC COLLECT METH UR: NORMAL
UROBILINOGEN UR STRIP-ACNC: 1 EU/DL
WBC # BLD AUTO: 11.31 K/UL
WBC # BLD AUTO: 9.55 K/UL

## 2018-09-18 PROCEDURE — 80053 COMPREHEN METABOLIC PANEL: CPT | Mod: 91

## 2018-09-18 PROCEDURE — 87040 BLOOD CULTURE FOR BACTERIA: CPT

## 2018-09-18 PROCEDURE — 25000242 PHARM REV CODE 250 ALT 637 W/ HCPCS: Performed by: HOSPITALIST

## 2018-09-18 PROCEDURE — 94640 AIRWAY INHALATION TREATMENT: CPT

## 2018-09-18 PROCEDURE — 84100 ASSAY OF PHOSPHORUS: CPT

## 2018-09-18 PROCEDURE — 84145 PROCALCITONIN (PCT): CPT

## 2018-09-18 PROCEDURE — 25000003 PHARM REV CODE 250: Performed by: EMERGENCY MEDICINE

## 2018-09-18 PROCEDURE — G8978 MOBILITY CURRENT STATUS: HCPCS | Mod: CJ

## 2018-09-18 PROCEDURE — G8979 MOBILITY GOAL STATUS: HCPCS | Mod: CI

## 2018-09-18 PROCEDURE — 63600175 PHARM REV CODE 636 W HCPCS: Performed by: NURSE PRACTITIONER

## 2018-09-18 PROCEDURE — 81003 URINALYSIS AUTO W/O SCOPE: CPT | Mod: 59

## 2018-09-18 PROCEDURE — 97116 GAIT TRAINING THERAPY: CPT

## 2018-09-18 PROCEDURE — 63600175 PHARM REV CODE 636 W HCPCS: Performed by: HOSPITALIST

## 2018-09-18 PROCEDURE — 83605 ASSAY OF LACTIC ACID: CPT

## 2018-09-18 PROCEDURE — 96375 TX/PRO/DX INJ NEW DRUG ADDON: CPT

## 2018-09-18 PROCEDURE — 80307 DRUG TEST PRSMV CHEM ANLYZR: CPT

## 2018-09-18 PROCEDURE — 63600175 PHARM REV CODE 636 W HCPCS: Performed by: EMERGENCY MEDICINE

## 2018-09-18 PROCEDURE — 83735 ASSAY OF MAGNESIUM: CPT

## 2018-09-18 PROCEDURE — 25000003 PHARM REV CODE 250: Performed by: HOSPITALIST

## 2018-09-18 PROCEDURE — 96376 TX/PRO/DX INJ SAME DRUG ADON: CPT

## 2018-09-18 PROCEDURE — G0378 HOSPITAL OBSERVATION PER HR: HCPCS

## 2018-09-18 PROCEDURE — 80053 COMPREHEN METABOLIC PANEL: CPT

## 2018-09-18 PROCEDURE — 93010 ELECTROCARDIOGRAM REPORT: CPT | Mod: ,,, | Performed by: INTERNAL MEDICINE

## 2018-09-18 PROCEDURE — 25000003 PHARM REV CODE 250: Performed by: NURSE PRACTITIONER

## 2018-09-18 PROCEDURE — 97162 PT EVAL MOD COMPLEX 30 MIN: CPT

## 2018-09-18 PROCEDURE — 83036 HEMOGLOBIN GLYCOSYLATED A1C: CPT

## 2018-09-18 PROCEDURE — 86141 C-REACTIVE PROTEIN HS: CPT

## 2018-09-18 PROCEDURE — 85025 COMPLETE CBC W/AUTO DIFF WBC: CPT

## 2018-09-18 PROCEDURE — 94760 N-INVAS EAR/PLS OXIMETRY 1: CPT

## 2018-09-18 PROCEDURE — S4991 NICOTINE PATCH NONLEGEND: HCPCS | Performed by: NURSE PRACTITIONER

## 2018-09-18 RX ORDER — POTASSIUM CHLORIDE 20 MEQ/1
40 TABLET, EXTENDED RELEASE ORAL ONCE
Status: COMPLETED | OUTPATIENT
Start: 2018-09-18 | End: 2018-09-18

## 2018-09-18 RX ORDER — HYDROCODONE BITARTRATE AND ACETAMINOPHEN 5; 325 MG/1; MG/1
1 TABLET ORAL EVERY 4 HOURS PRN
Status: DISCONTINUED | OUTPATIENT
Start: 2018-09-18 | End: 2018-09-18 | Stop reason: HOSPADM

## 2018-09-18 RX ORDER — BUDESONIDE 0.5 MG/2ML
0.5 INHALANT ORAL EVERY 12 HOURS
Status: DISCONTINUED | OUTPATIENT
Start: 2018-09-18 | End: 2018-09-18 | Stop reason: HOSPADM

## 2018-09-18 RX ORDER — ARFORMOTEROL TARTRATE 15 UG/2ML
15 SOLUTION RESPIRATORY (INHALATION) EVERY 12 HOURS
Status: DISCONTINUED | OUTPATIENT
Start: 2018-09-18 | End: 2018-09-18 | Stop reason: HOSPADM

## 2018-09-18 RX ORDER — ACETAMINOPHEN 325 MG/1
650 TABLET ORAL EVERY 4 HOURS PRN
Status: DISCONTINUED | OUTPATIENT
Start: 2018-09-18 | End: 2018-09-18 | Stop reason: HOSPADM

## 2018-09-18 RX ORDER — HYDROCODONE BITARTRATE AND ACETAMINOPHEN 5; 325 MG/1; MG/1
1 TABLET ORAL EVERY 4 HOURS PRN
Status: DISCONTINUED | OUTPATIENT
Start: 2018-09-18 | End: 2018-09-18

## 2018-09-18 RX ORDER — ASPIRIN 81 MG/1
81 TABLET ORAL DAILY
Status: DISCONTINUED | OUTPATIENT
Start: 2018-09-18 | End: 2018-09-18 | Stop reason: HOSPADM

## 2018-09-18 RX ORDER — HYDROMORPHONE HYDROCHLORIDE 2 MG/ML
1 INJECTION, SOLUTION INTRAMUSCULAR; INTRAVENOUS; SUBCUTANEOUS
Status: COMPLETED | OUTPATIENT
Start: 2018-09-18 | End: 2018-09-18

## 2018-09-18 RX ORDER — POTASSIUM CHLORIDE 750 MG/1
20 TABLET, EXTENDED RELEASE ORAL DAILY
Start: 2018-09-18 | End: 2018-09-18 | Stop reason: SDUPTHER

## 2018-09-18 RX ORDER — IBUPROFEN 200 MG
1 TABLET ORAL DAILY
Status: DISCONTINUED | OUTPATIENT
Start: 2018-09-18 | End: 2018-09-18 | Stop reason: HOSPADM

## 2018-09-18 RX ORDER — IBUPROFEN 200 MG
16 TABLET ORAL
Status: DISCONTINUED | OUTPATIENT
Start: 2018-09-18 | End: 2018-09-18 | Stop reason: HOSPADM

## 2018-09-18 RX ORDER — SULFAMETHOXAZOLE AND TRIMETHOPRIM 800; 160 MG/1; MG/1
1 TABLET ORAL 2 TIMES DAILY
Qty: 20 TABLET | Refills: 0 | Status: SHIPPED | OUTPATIENT
Start: 2018-09-18 | End: 2018-09-28

## 2018-09-18 RX ORDER — IBUPROFEN 200 MG
24 TABLET ORAL
Status: DISCONTINUED | OUTPATIENT
Start: 2018-09-18 | End: 2018-09-18 | Stop reason: HOSPADM

## 2018-09-18 RX ORDER — PANTOPRAZOLE SODIUM 40 MG/1
40 TABLET, DELAYED RELEASE ORAL DAILY
COMMUNITY

## 2018-09-18 RX ORDER — HYDROCODONE BITARTRATE AND ACETAMINOPHEN 5; 325 MG/1; MG/1
1 TABLET ORAL EVERY 6 HOURS PRN
Qty: 10 TABLET | Refills: 0 | Status: SHIPPED | OUTPATIENT
Start: 2018-09-18 | End: 2018-09-18 | Stop reason: SDUPTHER

## 2018-09-18 RX ORDER — GLUCAGON 1 MG
1 KIT INJECTION
Status: DISCONTINUED | OUTPATIENT
Start: 2018-09-18 | End: 2018-09-18 | Stop reason: HOSPADM

## 2018-09-18 RX ORDER — MORPHINE SULFATE 4 MG/ML
4 INJECTION, SOLUTION INTRAMUSCULAR; INTRAVENOUS EVERY 4 HOURS PRN
Status: DISCONTINUED | OUTPATIENT
Start: 2018-09-18 | End: 2018-09-18

## 2018-09-18 RX ORDER — POTASSIUM CHLORIDE 750 MG/1
10 TABLET, EXTENDED RELEASE ORAL DAILY
Start: 2018-09-18

## 2018-09-18 RX ORDER — FUROSEMIDE 10 MG/ML
40 INJECTION INTRAMUSCULAR; INTRAVENOUS 2 TIMES DAILY
Status: DISCONTINUED | OUTPATIENT
Start: 2018-09-18 | End: 2018-09-18 | Stop reason: HOSPADM

## 2018-09-18 RX ORDER — SODIUM CHLORIDE 0.9 % (FLUSH) 0.9 %
5 SYRINGE (ML) INJECTION
Status: DISCONTINUED | OUTPATIENT
Start: 2018-09-18 | End: 2018-09-18 | Stop reason: HOSPADM

## 2018-09-18 RX ORDER — CARVEDILOL 12.5 MG/1
12.5 TABLET ORAL 2 TIMES DAILY
Status: DISCONTINUED | OUTPATIENT
Start: 2018-09-18 | End: 2018-09-18 | Stop reason: HOSPADM

## 2018-09-18 RX ORDER — ONDANSETRON 2 MG/ML
4 INJECTION INTRAMUSCULAR; INTRAVENOUS EVERY 8 HOURS PRN
Status: DISCONTINUED | OUTPATIENT
Start: 2018-09-18 | End: 2018-09-18 | Stop reason: HOSPADM

## 2018-09-18 RX ORDER — SUCRALFATE 1 G/10ML
1 SUSPENSION ORAL 4 TIMES DAILY
Status: ON HOLD | COMMUNITY
End: 2018-12-18 | Stop reason: HOSPADM

## 2018-09-18 RX ORDER — ENOXAPARIN SODIUM 100 MG/ML
40 INJECTION SUBCUTANEOUS EVERY 24 HOURS
Status: DISCONTINUED | OUTPATIENT
Start: 2018-09-18 | End: 2018-09-18 | Stop reason: HOSPADM

## 2018-09-18 RX ORDER — IPRATROPIUM BROMIDE AND ALBUTEROL SULFATE 2.5; .5 MG/3ML; MG/3ML
3 SOLUTION RESPIRATORY (INHALATION) EVERY 6 HOURS PRN
Status: DISCONTINUED | OUTPATIENT
Start: 2018-09-18 | End: 2018-09-18 | Stop reason: HOSPADM

## 2018-09-18 RX ORDER — HYDROCODONE BITARTRATE AND ACETAMINOPHEN 5; 325 MG/1; MG/1
1 TABLET ORAL EVERY 6 HOURS PRN
Qty: 10 TABLET | Refills: 0 | Status: SHIPPED | OUTPATIENT
Start: 2018-09-18

## 2018-09-18 RX ADMIN — FUROSEMIDE 40 MG: 10 INJECTION, SOLUTION INTRAMUSCULAR; INTRAVENOUS at 10:09

## 2018-09-18 RX ADMIN — BUDESONIDE 0.5 MG: 0.5 SUSPENSION RESPIRATORY (INHALATION) at 07:09

## 2018-09-18 RX ADMIN — ARFORMOTEROL TARTRATE 15 MCG: 15 SOLUTION RESPIRATORY (INHALATION) at 07:09

## 2018-09-18 RX ADMIN — HYDROCODONE BITARTRATE AND ACETAMINOPHEN 1 TABLET: 5; 325 TABLET ORAL at 03:09

## 2018-09-18 RX ADMIN — HYDROMORPHONE HYDROCHLORIDE 1 MG: 2 INJECTION INTRAMUSCULAR; INTRAVENOUS; SUBCUTANEOUS at 01:09

## 2018-09-18 RX ADMIN — PIPERACILLIN SODIUM AND TAZOBACTAM SODIUM 4.5 G: 4; .5 INJECTION, POWDER, LYOPHILIZED, FOR SOLUTION INTRAVENOUS at 02:09

## 2018-09-18 RX ADMIN — VANCOMYCIN HYDROCHLORIDE 1000 MG: 1 INJECTION, POWDER, LYOPHILIZED, FOR SOLUTION INTRAVENOUS at 12:09

## 2018-09-18 RX ADMIN — NICOTINE 1 PATCH: 21 PATCH, EXTENDED RELEASE TRANSDERMAL at 11:09

## 2018-09-18 RX ADMIN — ACETAMINOPHEN 650 MG: 325 TABLET ORAL at 11:09

## 2018-09-18 RX ADMIN — ASPIRIN 81 MG: 81 TABLET, COATED ORAL at 09:09

## 2018-09-18 RX ADMIN — CARVEDILOL 12.5 MG: 12.5 TABLET, FILM COATED ORAL at 09:09

## 2018-09-18 RX ADMIN — HYDROCODONE BITARTRATE AND ACETAMINOPHEN 1 TABLET: 5; 325 TABLET ORAL at 01:09

## 2018-09-18 RX ADMIN — DICYCLOMINE HYDROCHLORIDE 1 ML: 10 SOLUTION ORAL at 11:09

## 2018-09-18 RX ADMIN — HYDROCODONE BITARTRATE AND ACETAMINOPHEN 1 TABLET: 5; 325 TABLET ORAL at 09:09

## 2018-09-18 RX ADMIN — POTASSIUM CHLORIDE 40 MEQ: 1500 TABLET, EXTENDED RELEASE ORAL at 09:09

## 2018-09-18 RX ADMIN — SODIUM CHLORIDE 3621 ML: 0.9 INJECTION, SOLUTION INTRAVENOUS at 12:09

## 2018-09-18 RX ADMIN — PIPERACILLIN AND TAZOBACTAM 4.5 G: 4; .5 INJECTION, POWDER, LYOPHILIZED, FOR SOLUTION INTRAVENOUS; PARENTERAL at 09:09

## 2018-09-18 RX ADMIN — VANCOMYCIN HYDROCHLORIDE 1250 MG: 1 INJECTION, POWDER, LYOPHILIZED, FOR SOLUTION INTRAVENOUS at 01:09

## 2018-09-18 NOTE — ED PROVIDER NOTES
SCRIBE #1 NOTE: I, Nina Haddad, am scribing for, and in the presence of, Joshua Avelar Jr., MD. I have scribed the entire note.        History     Chief Complaint   Patient presents with    Cellulitis     seen yesterday for abcess to right calf with antibiotics given. Pain, redness and swelling worse today. Leg tight with swelling     Review of patient's allergies indicates:  No Known Allergies      History of Present Illness     HPI    9/17/2018, 11:56 PM  History obtained from the patient      History of Present Illness: Valorie Monzon is a 48 y.o. female patient with a PMHx including COPD and tobacco use who presents to the Emergency Department for evaluation of constant, 8/10 distal RLE pain at wound with associated erythema and moderate swelling. Pt states she fell out of her bed while she was sleeping and hit her RLE on her nightstand 8 days ago, sustaining the distal RLE wound. Denies CHI or LOC at that time. Pt was seen in the ED for the same yesterday, at which time she was prescribed Bactrim and Norco and discharged. Pt returns to ED today c/o progressively worsening pain, erythema, and swelling, despite starting the abx as instructed. Pt notes her pain radiates around her entire R ankle and is exacerbated with ambulation, making ambulation exceedingly challenging at this time. Denies fever, chills, SOB, abd pain, CP, numbness, or tingling. No other sx reported.    Arrival mode: Personal vehicle     PCP: Karri Levy MD        Past Medical History:  Past Medical History:   Diagnosis Date    COPD (chronic obstructive pulmonary disease)     Depression     GERD (gastroesophageal reflux disease)     Hypercholesteremia     Hypertension     Kidney stone     Stroke     Tobacco use      Past Surgical History:  Past Surgical History:   Procedure Laterality Date    HYSTERECTOMY      KNEE ARTHROSCOPY W/ ACL RECONSTRUCTION Left     LITHOTRIPSY         Family History:  Family History    Problem Relation Age of Onset    Kidney disease Mother     Cancer Mother     Liver disease Mother     Breast cancer Mother      Social History     Tobacco Use    Smoking status: Former Smoker     Packs/day: 1.00     Types: Cigarettes     Last attempt to quit: 2018     Years since quittin.6    Smokeless tobacco: Never Used    Tobacco comment: patches    Substance and Sexual Activity    Alcohol use: No    Drug use: No    Sexual activity: Not Currently     Partners: Male      Review of Systems     Review of Systems   Constitutional: Negative for activity change, appetite change, chills, diaphoresis, fatigue and fever.   HENT: Negative for congestion, ear pain, nosebleeds, rhinorrhea, sinus pain, sore throat and trouble swallowing.    Eyes: Negative for pain and discharge.   Respiratory: Negative for cough, chest tightness, shortness of breath, wheezing and stridor.    Cardiovascular: Negative for chest pain, palpitations and leg swelling.   Gastrointestinal: Negative for abdominal distention, abdominal pain, blood in stool, constipation, diarrhea, nausea and vomiting.   Genitourinary: Negative for difficulty urinating, dysuria, flank pain, frequency, hematuria and urgency.   Musculoskeletal: Negative for arthralgias, back pain, myalgias and neck pain.        Positive for distal RLE pain at wound  Positive for swelling at distal RLE   Skin: Positive for color change (erythema surrounding RLE wound) and wound (to distal RLE). Negative for pallor and rash.   Neurological: Negative for dizziness, syncope, weakness, light-headedness, numbness and headaches.        Negative for tingling   Hematological: Does not bruise/bleed easily.   Psychiatric/Behavioral: Negative for confusion and self-injury.   All other systems reviewed and are negative.     Physical Exam     Initial Vitals [18 2225]   BP Pulse Resp Temp SpO2   130/84 100 (!) 24 98.4 °F (36.9 °C) 97 %      MAP       --          Physical  "Exam  Nursing Notes and Vital Signs Reviewed.  Constitutional: Patient is in no acute distress. Well-developed and well-nourished.  Head: Atraumatic. Normocephalic.  Eyes: PERRL. EOM intact. Conjunctivae are not pale. No scleral icterus.  ENT: Mucous membranes are moist.   Neck: Supple. Full ROM. No lymphadenopathy.  Cardiovascular: Regular rate. Regular rhythm. No murmurs, rubs, or gallops. Distal pulses are 2+ and symmetric.  Pulmonary/Chest: No respiratory distress. Clear to auscultation bilaterally. No wheezing or rales.  Abdominal: Soft and non-distended.  There is no tenderness.  No rebound, guarding, or rigidity.  Musculoskeletal: Moves all extremities. No obvious deformities.   R Ankle: Bruising to medial aspect of the foot. There is increased (increased from my exam yesterday) tenderness, erythema, and swelling from ankle to distal 3rd of the calf. Erythema on ankle increased. Serous drainage from wound on medial ankle. No abscess.   Skin: There is increased (increased from my exam yesterday) erythema from ankle to distal 3rd of the calf. Erythema on ankle increased.   Neurological:  Alert, awake, and appropriate.  Normal speech.  No acute focal neurological deficits are appreciated.  Psychiatric: Normal affect. Good eye contact. Appropriate in content.     ED Course     Procedures    ED Vital Signs:  Vitals:    09/17/18 2225 09/18/18 0000 09/18/18 0030 09/18/18 0044   BP: 130/84 132/74 128/72    Pulse: 100 90 91    Resp: (!) 24 20 (!) 23    Temp: 98.4 °F (36.9 °C)   98.4 °F (36.9 °C)   TempSrc: Oral      SpO2: 97% 98% 99%    Weight: 120.7 kg (266 lb 1.5 oz)      Height: 5' 4" (1.626 m)       09/18/18 0100   BP: 112/65   Pulse: 91   Resp: (!) 29   Temp:    TempSrc:    SpO2: 97%   Weight:    Height:        Abnormal Lab Results:  Labs Reviewed   CBC W/ AUTO DIFFERENTIAL - Abnormal; Notable for the following components:       Result Value    Gran # (ANC) 9.0 (*)     Gran% 79.8 (*)     Lymph% 10.4 (*)     All " other components within normal limits   COMPREHENSIVE METABOLIC PANEL - Abnormal; Notable for the following components:    Albumin 3.4 (*)     Alkaline Phosphatase 167 (*)     All other components within normal limits   CULTURE, BLOOD   CULTURE, BLOOD   LACTIC ACID, PLASMA   URINALYSIS, REFLEX TO URINE CULTURE    Narrative:     Preferred Collection Type->Urine, Clean Catch   PROCALCITONIN   LACTIC ACID, PLASMA        All Lab Results:  Results for orders placed or performed during the hospital encounter of 09/17/18   CBC auto differential   Result Value Ref Range    WBC 11.31 3.90 - 12.70 K/uL    RBC 4.14 4.00 - 5.40 M/uL    Hemoglobin 12.3 12.0 - 16.0 g/dL    Hematocrit 37.4 37.0 - 48.5 %    MCV 90 82 - 98 fL    MCH 29.7 27.0 - 31.0 pg    MCHC 32.9 32.0 - 36.0 g/dL    RDW 14.4 11.5 - 14.5 %    Platelets 288 150 - 350 K/uL    MPV 11.1 9.2 - 12.9 fL    Gran # (ANC) 9.0 (H) 1.8 - 7.7 K/uL    Lymph # 1.2 1.0 - 4.8 K/uL    Mono # 0.8 0.3 - 1.0 K/uL    Eos # 0.3 0.0 - 0.5 K/uL    Baso # 0.03 0.00 - 0.20 K/uL    Gran% 79.8 (H) 38.0 - 73.0 %    Lymph% 10.4 (L) 18.0 - 48.0 %    Mono% 6.9 4.0 - 15.0 %    Eosinophil% 2.6 0.0 - 8.0 %    Basophil% 0.3 0.0 - 1.9 %    Differential Method Automated    Comprehensive metabolic panel   Result Value Ref Range    Sodium 138 136 - 145 mmol/L    Potassium 4.5 3.5 - 5.1 mmol/L    Chloride 103 95 - 110 mmol/L    CO2 23 23 - 29 mmol/L    Glucose 93 70 - 110 mg/dL    BUN, Bld 6 6 - 20 mg/dL    Creatinine 0.8 0.5 - 1.4 mg/dL    Calcium 8.8 8.7 - 10.5 mg/dL    Total Protein 7.8 6.0 - 8.4 g/dL    Albumin 3.4 (L) 3.5 - 5.2 g/dL    Total Bilirubin 0.5 0.1 - 1.0 mg/dL    Alkaline Phosphatase 167 (H) 55 - 135 U/L    AST 38 10 - 40 U/L    ALT 21 10 - 44 U/L    Anion Gap 12 8 - 16 mmol/L    eGFR if African American >60 >60 mL/min/1.73 m^2    eGFR if non African American >60 >60 mL/min/1.73 m^2   Lactic acid, plasma #1   Result Value Ref Range    Lactate (Lactic Acid) 1.7 0.5 - 2.2 mmol/L   Urinalysis,  Reflex to Urine Culture Urine, Clean Catch   Result Value Ref Range    Specimen UA Urine, Clean Catch     Color, UA Yellow Yellow, Straw, Ana    Appearance, UA Clear Clear    pH, UA 7.0 5.0 - 8.0    Specific Gravity, UA 1.010 1.005 - 1.030    Protein, UA Negative Negative    Glucose, UA Negative Negative    Ketones, UA Negative Negative    Bilirubin (UA) Negative Negative    Occult Blood UA Negative Negative    Nitrite, UA Negative Negative    Urobilinogen, UA 1.0 <2.0 EU/dL    Leukocytes, UA Negative Negative   Procalcitonin   Result Value Ref Range    Procalcitonin 0.19 <0.25 ng/mL       Imaging Results:  Imaging Results          X-Ray Chest AP Portable    No acute findings.               The EKG was ordered, reviewed, and independently interpreted by the ED provider.  Interpretation time: 0014  Rate: 86 BPM  Rhythm: normal sinus rhythm  Interpretation: Low voltage QRS. No STEMI.           The Emergency Provider reviewed the vital signs and test results, which are outlined above.     ED Discussion     1:31 AM: Patient has had worsening of her cellulitis over the last 24 hours in spite of oral antibiotic coverage. She is an outpatient treatment failure who presents to ED today with tachycardia and tachypnea. Will admit for IV antibiotics.    2:00 AM: Discussed case with Kwabena Cabezas NP ([facility]). He agrees with current care and management of pt and accepts admission.   Admitting Service: Hospital medicine   Admitting Physician: Dr. Gusman  Admit to: Observation    ED Medication(s):  Medications   piperacillin-tazobactam 4.5 g in dextrose 5 % 100 mL IVPB (ready to mix system) (not administered)   sodium chloride 0.9% bolus 3,621 mL (3,621 mLs Intravenous New Bag 9/18/18 0050)   vancomycin in dextrose 5 % 1 gram/250 mL IVPB 1,000 mg (1,000 mg Intravenous New Bag 9/18/18 0051)   hydromorphone (PF) injection 1 mg (1 mg Intravenous Given 9/18/18 0118)            Medical Decision Making     Medical Decision  Making:   Clinical Tests:   Lab Tests: Reviewed and Ordered  Radiological Study: Reviewed and Ordered  Medical Tests: Reviewed and Ordered           Scribe Attestation:   Scribe #1: I performed the above scribed service and the documentation accurately describes the services I performed. I attest to the accuracy of the note. 09/18/2018 2:01 AM    Attending:   Physician Attestation Statement for Scribe #1: I, Joshua Avelar Jr., MD, personally performed the services described in this documentation, as scribed by Nina Haddad, in my presence, and it is both accurate and complete.           Clinical Impression       ICD-10-CM ICD-9-CM   1. Cellulitis L03.90 682.9       Disposition:   Disposition: Discharged  Condition: Stable           Joshua Avelar Jr., MD  09/18/18 0201

## 2018-09-18 NOTE — HPI
48F h/o COPD and tobacco use re-presents with RLE infection.  Started 10 days ago when patient hit her R leg against a a nightstand.  Presented to ED yesterday, XR negative for fx, and sent home on bactrim.  8/10 distal RLE pain at wound with associated erythema and moderate swelling.  She was discharged on bactrim.  ER physician reports significant worsening of cellulitis even with one day of treatment with PO bactrim.   Pt notes her pain radiates around her entire R ankle and is exacerbated with ambulation, making ambulation exceedingly challenging at this time. Denies fever, chills, SOB, abd pain, CP, numbness, or tingling. No other sx reported.  In ER, VSS, labs wnl.  Lactate 1.7. Patient started on zosyn/vanc.  Hospital medicine called for admission.

## 2018-09-18 NOTE — ASSESSMENT & PLAN NOTE
- continue formoterol/budesonide q12h, duoneb q6h prn sob/wheezing  - recommend tobacco cessation

## 2018-09-18 NOTE — CONSULTS
Valorie Monzon [id number] is a 48 y.o. female who has been consulted for vancomycin dosing.  Dx: skin and soft tissue / goal trough 10-15  The patient has the following labs:     Date Creatinine (mg/dl)    BUN WBC Count   9/18/2018 Estimated Creatinine Clearance: 112.5 mL/min (based on SCr of 0.8 mg/dL). Lab Results   Component Value Date    BUN 6 09/18/2018     Lab Results   Component Value Date    WBC 9.55 09/18/2018      which calculates to an Estimated Creatinine Clearance: 112.5 mL/min (based on SCr of 0.8 mg/dL)..       Current weight is 120.7 kg (266 lb 1.5 oz)  One time ER dose of 1000mg  The patient will be started on vancomycin at a dose of 1250 mg every 12 hours. Patient will be followed by pharmacy for changes in renal function, toxicity, and efficacy.  The vancomycin trough has been ordered for 9/19 at 12:00.      Thank you for allowing us to participate in this patient's care.     Vel Vicente

## 2018-09-18 NOTE — CONSULTS
Patient discharged before I could see them. Spoke with patient and daughter in law, Oliver Pappas, via phone to inform of PCP appointment on 09/25/18 @1:30pm with Dr. Shin, [address] [facility]  [phone].    Outpatient PT/OT:  CM spoke with [facility] Outpatient PT/OT intake depart and was instructed to submit referral via fax and they will contact the patient regarding appointment. CM submitted referral via fax ([phone]. CM shared information with patient as well.    DME:  Walker and bedside commode was delivered to room prior to discharge by Claudia adame/[facility].        How Severe Is Your Condition?: mild

## 2018-09-18 NOTE — HOSPITAL COURSE
The pt was placed in observation for RLE cellulitis on IV Vanc and Zosyn. Pt reports she fell, twisted her ankle and hit her anterior lower leg on the night stand. Small abrasion noted -clean and dry with no drainage. Abrasion has surrounding erythema- pt reports improvement since arrival. Wound care instructed pt on dressing changes. PT ambulated pt and recommended a walker for home use. Bedside commode and walker arranged.  consulted for outpatient therapy with medicaid. Pt reports she was prescribed 12 pills of Bactrim and took 4 tablets. Pt was prescribed Bactrim and instructed to take a total of 10days. She was instructed to follow up with PCP in 3 days for recheck.

## 2018-09-18 NOTE — ED NOTES
Transported via stretcher with SO accompanying us to TELE rm 222.  Transferred to bed independently.  Vitals stable.  Bedside report given to Faiza CRUZ

## 2018-09-18 NOTE — TELEPHONE ENCOUNTER
Reason for Disposition   SEVERE pain    Protocols used: ST CELLULITIS ON ANTIBIOTIC FOLLOW-UP CALL-A-AH

## 2018-09-18 NOTE — DISCHARGE INSTRUCTIONS
Wound care for home will consist of cleansing with antibacterial soap and water. Apply antibiotic ointment and cover with large bandaid daily    Elevate RLE on 2 pillows

## 2018-09-18 NOTE — PT/OT/SLP EVAL
Physical Therapy Evaluation    Patient Name:  Valorie Monzon   MRN:  [id number]    Recommendations:     Discharge Recommendations:  home health PT   Discharge Equipment Recommendations: walker, rolling   Barriers to discharge: None    Assessment:     Valorie Monzon is a 48 y.o. female admitted with a medical diagnosis of Cellulitis.  She presents with the following impairments/functional limitations:  weakness, impaired self care skills, impaired balance, impaired functional mobilty, impaired endurance, gait instability, decreased lower extremity function, pain, edema, decreased ROM, decreased safety awareness .    Rehab Prognosis:  GOOD; patient would benefit from acute skilled PT services to address these deficits and reach maximum level of function.      Recent Surgery: * No surgery found *      Plan:     During this hospitalization, patient to be seen   to address the above listed problems via gait training, therapeutic activities, therapeutic exercises  · Plan of Care Expires:  09/25/18   Plan of Care Reviewed with: patient    Subjective     Communicated with NURSE VELASCO AND Epic CHART REVIEW prior to session.  Patient found SEATED ON BSC upon PT entry to room, agreeable to evaluation.      Chief Complaint: PAIN OF B LE R>L  Patient comments/goals:GG HOME   Pain/Comfort:  · Pain Rating 1: 8/10  · Location - Side 1: Right  · Location 1: leg  · Pain Rating Post-Intervention 1: 8/10    Patients cultural, spiritual, Adventist conflicts given the current situation:      Living Environment:   PT LIVES AT HOME WITH SON AND DAUGHTER -IN -LA[place].  Prior to admission, patients level of function was MOD I AS PT REPORTED FURNITURE AMBULATION AT HOME.  Patient has the following equipment: none(PT REPORTED FURNITURE AMBULATING AT TIMES. ).  DME owned (not currently used): none.  Upon discharge, patient will have assistance from FAMILY.    Objective:     Patient found with: peripheral IV, telemetry     General  Precautions: Standard, fall   Orthopedic Precautions:N/A   Braces: N/A     Exams:  · Postural Exam:  Patient presented with the following abnormalities:    · -       Rounded shoulders  · -       Forward head  · RLE ROM: LIMITED  · RLE Strength: MMT NOT COMPLETED D/T PAIN  · LLE ROM: WFL  · LLE Strength: LIMITED    Functional Mobility:  · PT MET IN RM ON COMMODE. PT STOOD FROM COMMODE AND GT TRAINED X 60' WITH RW AND SLOW STEP TO GT AND CUES FOR POSTURE. PT WITH DEC WB ON R LE D/T PAIN. PT RETURNED TO RM T/F TO BEDSIDE. PT EDUCATED ON ROLE P.T AND REC AT D/C.    AM-PAC 6 CLICK MOBILITY  Total Score:18     Patient left seated  eob with call button in reach and FAMILY present.    GOALS:   Multidisciplinary Problems     Physical Therapy Goals        Problem: Physical Therapy Goal    Goal Priority Disciplines Outcome Goal Variances Interventions   Physical Therapy Goal     PT, PT/OT      Description:  PT WILL BE SEEN FOR P.T. FOR A MIN OF 5 OUT OF 7 DAYS A WEEK  LT18  1. PT WILL COMPLETE BED MOBILITY IND  2. PT WILL T/F TO CHAIR WITH RW AND SBA  3. PT WILL GT TRAIN X 150' WITH RW AND SBA  4. PT WILL COMPLETE B LE TE X 20 REPS FOR ROM AND STRENGTHENING.                     History:     Past Medical History:   Diagnosis Date    COPD (chronic obstructive pulmonary disease)     Depression     GERD (gastroesophageal reflux disease)     Hypercholesteremia     Hypertension     Kidney stone     Stroke     Tobacco use        Past Surgical History:   Procedure Laterality Date    HYSTERECTOMY      KNEE ARTHROSCOPY W/ ACL RECONSTRUCTION Left     LITHOTRIPSY         Clinical Decision Making:     History  Co-morbidities and personal factors that may impact the plan of care Examination  Body Structures and Functions, activity limitations and participation restrictions that may impact the plan of care Clinical Presentation   Decision Making/ Complexity Score   Co-morbidities:   [] Time since onset of injury / illness  / exacerbation  [] Status of current condition  []Patient's cognitive status and safety concerns    [] Multiple Medical Problems (see med hx)  Personal Factors:   [] Patient's age  [] Prior Level of function   [] Patient's home situation (environment and family support)  [] Patient's level of motivation  [] Expected progression of patient      HISTORY:(criteria)    [] 04441 - no personal factors/history    [] 69613 - has 1-2 personal factor/comorbidity     [] 06615 - has >3 personal factor/comorbidity     Body Regions:  [] Objective examination findings  [] Head     []  Neck  [] Trunk   [] Upper Extremity  [] Lower Extremity    Body Systems:  [] For communication ability, affect, cognition, language, and learning style: the assessment of the ability to make needs known, consciousness, orientation (person, place, and time), expected emotional /behavioral responses, and learning preferences (eg, learning barriers, education  needs)  [] For the neuromuscular system: a general assessment of gross coordinated movement (eg, balance, gait, locomotion, transfers, and transitions) and motor function  (motor control and motor learning)  [] For the musculoskeletal system: the assessment of gross symmetry, gross range of motion, gross strength, height, and weight  [] For the integumentary system: the assessment of pliability(texture), presence of scar formation, skin color, and skin integrity  [] For cardiovascular/pulmonary system: the assessment of heart rate, respiratory rate, blood pressure, and edema     Activity limitations:    [] Patient's cognitive status and saf ety concerns          [] Status of current condition      [] Weight bearing restriction  [] Cardiopulmunary Restriction    Participation Restrictions:   [] Goals and goal agreement with the patient     [] Rehab potential (prognosis) and probable outcome      Examination of Body System: (criteria)    [] 82790 - addressing 1-2 elements    [] 61198 - addressing a  total of 3 or more elements     [] 16218 -  Addressing a total of 4 or more elements         Clinical Presentation: (criteria)  Choose one     On examination of body system using standardized tests and measures patient presents with (CHOOSE ONE) elements from any of the following: body structures and functions, activity limitations, and/or participation restrictions.  Leading to a clinical presentation that is considered (CHOOSE ONE)                              Clinical Decision Making  (Eval Complexity):  Choose One     Time Tracking:     PT Received On: 09/18/18  PT Start Time: 1100     PT Stop Time: 1123  PT Total Time (min): 23 min     Billable Minutes: Evaluation 13 and Gait Training 10      Bruna Rider, PT  09/18/2018

## 2018-09-18 NOTE — PLAN OF CARE
Problem: Patient Care Overview  Goal: Plan of Care Review  Outcome: Ongoing (interventions implemented as appropriate)  Cellulitis to RLE, RLE elevated. Pt remains free of injury, pain managed adequately, no s/s of distress. 24 hour chart check completed. Will continue to monitor.

## 2018-09-18 NOTE — DISCHARGE SUMMARY
[facility] Hospital Medicine  Discharge Summary      Patient Name: Valorie Monzon  MRN: [id number]  Admission Date: 9/17/2018  Hospital Length of Stay: 0 days  Discharge Date and Time:  09/18/2018 3:48 PM  Attending Physician:Dr. Lepe   Discharging Provider: Rose Santiago NP  Primary Care Provider: Karri Levy MD      HPI:   48F h/o COPD and tobacco use re-presents with RLE infection.  Started 10 days ago when patient hit her R leg against a a nightstand.  Presented to ED yesterday, XR negative for fx, and sent home on bactrim.  8/10 distal RLE pain at wound with associated erythema and moderate swelling.  She was discharged on bactrim.  ER physician reports significant worsening of cellulitis even with one day of treatment with PO bactrim.   Pt notes her pain radiates around her entire R ankle and is exacerbated with ambulation, making ambulation exceedingly challenging at this time. Denies fever, chills, SOB, abd pain, CP, numbness, or tingling. No other sx reported.  In ER, VSS, labs wnl.  Lactate 1.7. Patient started on zosyn/vanc.  Hospital medicine called for admission.     * No surgery found *      Hospital Course:   The pt was placed in observation for RLE cellulitis on IV Vanc and Zosyn. Pt reports she fell, twisted her ankle and hit her anterior lower leg on the night stand. Small abrasion noted -clean and dry with no drainage. Abrasion has surrounding erythema- pt reports improvement since arrival. Wound care instructed pt on dressing changes. PT ambulated pt and recommended a walker for home use. Bedside commode and walker arranged.  consulted for outpatient therapy with medicaid. Pt reports she was prescribed 12 pills of Bactrim and took 4 tablets. Pt was prescribed Bactrim and instructed to take a total of 10days. She was instructed to follow up with PCP in 3 days for recheck.      Consults:   Consults (From admission, onward)        Status Ordering Provider  "    Inpatient consult to Social Work  Once     Provider:  (Not yet assigned)    Completed JAIRO ELLIS     Inpatient consult to Social Work  Once     Provider:  (Not yet assigned)    Completed JAIRO ELLIS     Inpatient consult to Social Work  Once     Provider:  (Not yet assigned)    Completed JAIRO ELLIS     Pharmacy to dose Vancomycin consult  Once     Provider:  (Not yet assigned)    Acknowledged CHATO HERNANDEZ JR          No new Assessment & Plan notes have been filed under this hospital service since the last note was generated.  Service: Hospital Medicine    Final Active Diagnoses:    Diagnosis Date Noted POA    PRINCIPAL PROBLEM:  Cellulitis [L03.90] 09/18/2018 Yes    COPD (chronic obstructive pulmonary disease) [J44.9] 04/04/2018 Yes    Tobacco abuse [Z72.0] 04/04/2018 Yes    Fall [W19.XXXA] 04/04/2018 Yes      Problems Resolved During this Admission:       Discharged Condition: stable    Disposition: Home or Self Care    Follow Up:  Follow-up Information     Karri Levy MD In 3 days.    Specialty:  Internal Medicine  Why:  for recheck   Contact information:  [address] [address] [address]  [phone]                 Patient Instructions:      WALKER FOR HOME USE     Order Specific Question Answer Comments   Type of Walker: Adult (5'4"-6'6")    With wheels? Yes    Height: 5' 4" (1.626 m)    Weight: 120.7 kg (266 lb 1.5 oz)    Length of need (1-99 months): 99    Does patient have medical equipment at home? none PT REPORTED FURNITURE AMBULATING AT TIMES.    Please check all that apply: Patient's condition impairs ambulation.      WHEELCHAIR FOR HOME USE     Order Specific Question Answer Comments   Hours in W/C per day: 4    Type of Wheelchair: Standard    Size(Width): 18"(STD adult)    Leg Support: STD footrests    Lap Belt: Velcro    Cushion: Basic    Height: 5' 4" (1.626 m)    Weight: 120.7 kg (266 lb 1.5 oz)    Does patient have medical " "equipment at home? none PT REPORTED FURNITURE AMBULATING AT TIMES.    Length of need (1-99 months): 99    Please check all that apply: Caregiver is capable and willing to operate wheelchair safely.      COMMODE FOR HOME USE     Order Specific Question Answer Comments   Type: Standard    Height: 5' 4" (1.626 m)    Weight: 120.7 kg (266 lb 1.5 oz)    Does patient have medical equipment at home? none PT REPORTED FURNITURE AMBULATING AT TIMES.    Length of need (1-99 months): 99      Referral to OutPatient  Physical therapy   Referral Priority: Routine Referral Type: Physical Medicine   Referral Reason: Specialty Services Required   Requested Specialty: Physical Therapy   Number of Visits Requested: 1     Referral to Outpatient Occupational therapy   Referral Priority: Routine Referral Type: Occupational Therapy   Referral Reason: Specialty Services Required   Requested Specialty: Occupational Therapy   Number of Visits Requested: 1       Significant Diagnostic Studies:   Imaging Results          X-Ray Chest AP Portable (Final result)  Result time 09/18/18 06:47:19    Final result by CALIN Galvan Sr., MD (09/18/18 06:47:19)                 Impression:      1. The lungs are clear.  2. There is mild cardiomegaly.  .      Electronically signed by: Kd Galvan MD  Date:    09/18/2018  Time:    06:47             Narrative:    EXAMINATION:  XR CHEST AP PORTABLE    CLINICAL HISTORY:  Sepsis;    COMPARISON:  06/26/2018    FINDINGS:  The size of the heart is prominent.  The lungs are clear. There is no pneumothorax.  The costophrenic angles are sharp.                                Pending Diagnostic Studies:     None         Medications:  Reconciled Home Medications:      Medication List      START taking these medications    HYDROcodone-acetaminophen 5-325 mg per tablet  Commonly known as:  NORCO  Take 1 tablet by mouth every 6 (six) hours as needed for Pain.     neomycin-polymyxin-pramoxine 3.5-10,000-10 " mg-unit-mg/gram Crea  Commonly known as:  NEOSPORIN  Apply topically to right lower extremity wound daily     potassium chloride SA 10 MEQ tablet  Commonly known as:  K-DUR,KLOR-CON  Take 1 tablet (10 mEq total) by mouth once daily.        CHANGE how you take these medications    furosemide 20 MG tablet  Commonly known as:  LASIX  Take 1 tablet (20 mg total) by mouth once daily.  What changed:  when to take this        CONTINUE taking these medications    albuterol 90 mcg/actuation inhaler  Commonly known as:  PROVENTIL/VENTOLIN HFA  Inhale 2 puffs into the lungs every 4 (four) hours as needed for Wheezing.     ALPRAZolam 0.25 MG tablet  Commonly known as:  XANAX  Take 1 tablet (0.25 mg total) by mouth 3 (three) times daily as needed for Anxiety.     aspirin 325 MG EC tablet  Commonly known as:  ECOTRIN  Take 325 mg by mouth once daily.     atorvastatin 40 MG tablet  Commonly known as:  LIPITOR  TAKE 1 TABLET BY MOUTH NIGHTLY AVOID GRAPEFRUIT     CALCIUM ANTACID 300 mg (750 mg) Chew  Generic drug:  calcium carbonate  Take by mouth 2 (two) times daily as needed.     carvedilol 12.5 MG tablet  Commonly known as:  COREG  Take 12.5 mg by mouth 2 (two) times daily.     DULoxetine 60 MG capsule  Commonly known as:  CYMBALTA  Take 60 mg by mouth once daily.     fluticasone-vilanterol 100-25 mcg/dose diskus inhaler  Commonly known as:  BREO ELLIPTA  1 puff daily     gabapentin 600 MG tablet  Commonly known as:  NEURONTIN  Take 600 mg by mouth 2 (two) times daily.     nicotine 21-14-7 mg/24 hr Ptds     pantoprazole 40 MG tablet  Commonly known as:  PROTONIX  Take 40 mg by mouth once daily.     sucralfate 100 mg/mL suspension  Commonly known as:  CARAFATE  Take 1 g by mouth 4 (four) times daily.     sulfamethoxazole-trimethoprim 800-160mg 800-160 mg Tab  Commonly known as:  BACTRIM DS  Take 1 tablet by mouth 2 (two) times daily. for 10 days        STOP taking these medications    nicotine polacrilex 2 MG Lozg             Indwelling Lines/Drains at time of discharge:   Lines/Drains/Airways          None          Time spent on the discharge of patient: 42 minutes  Patient was seen and examined on the date of discharge and determined to be suitable for discharge.         Rose Santiago NP  Department of Hospital Medicine  [facility]

## 2018-09-18 NOTE — H&P
[facility] Hospital Medicine  History & Physical    Patient Name: Valorie Monzon  MRN: [id number]  Admission Date: 9/17/2018  Attending Physician: Mark Gusman MD  Primary Care Provider: Karri Lvey MD         Patient information was obtained from patient and ER records.     Subjective:     Principal Problem:Cellulitis    Chief Complaint:   Chief Complaint   Patient presents with    Cellulitis     seen yesterday for abcess to right calf with antibiotics given. Pain, redness and swelling worse today. Leg tight with swelling        HPI: 48F h/o COPD and tobacco use re-presents with RLE infection.  Started 10 days ago when patient hit her R leg against a a nightstand.  Presented to ED yesterday, XR negative for fx, and sent home on bactrim.  8/10 distal RLE pain at wound with associated erythema and moderate swelling.  She was discharged on bactrim.  ER physician reports significant worsening of cellulitis even with one day of treatment with PO bactrim.   Pt notes her pain radiates around her entire R ankle and is exacerbated with ambulation, making ambulation exceedingly challenging at this time. Denies fever, chills, SOB, abd pain, CP, numbness, or tingling. No other sx reported.  In ER, VSS, labs wnl.  Lactate 1.7. Patient started on zosyn/vanc.  Hospital medicine called for admission.     Past Medical History:   Diagnosis Date    COPD (chronic obstructive pulmonary disease)     Depression     GERD (gastroesophageal reflux disease)     Hypercholesteremia     Hypertension     Kidney stone     Stroke     Tobacco use        Past Surgical History:   Procedure Laterality Date    HYSTERECTOMY      KNEE ARTHROSCOPY W/ ACL RECONSTRUCTION Left     LITHOTRIPSY         Review of patient's allergies indicates:  No Known Allergies    No current facility-administered medications on file prior to encounter.      Current Outpatient Medications on File Prior to Encounter   Medication Sig     albuterol 90 mcg/actuation inhaler Inhale 2 puffs into the lungs every 4 (four) hours as needed for Wheezing.    ALPRAZolam (XANAX) 0.25 MG tablet Take 1 tablet (0.25 mg total) by mouth 3 (three) times daily as needed for Anxiety.    aspirin (ECOTRIN) 325 MG EC tablet Take 325 mg by mouth once daily.    atorvastatin (LIPITOR) 40 MG tablet TAKE 1 TABLET BY MOUTH NIGHTLY AVOID GRAPEFRUIT    carvedilol (COREG) 12.5 MG tablet Take 12.5 mg by mouth 2 (two) times daily.     DULoxetine (CYMBALTA) 60 MG capsule Take 60 mg by mouth once daily.    furosemide (LASIX) 20 MG tablet Take 1 tablet (20 mg total) by mouth once daily. (Patient taking differently: Take 20 mg by mouth 2 (two) times daily. )    gabapentin (NEURONTIN) 600 MG tablet Take 600 mg by mouth 2 (two) times daily.    HYDROcodone-acetaminophen (NORCO) 5-325 mg per tablet Take 1 tablet by mouth every 6 (six) hours as needed for Pain.    nicotine polacrilex 2 MG Lozg Take 2 mg by mouth as needed.    pantoprazole (PROTONIX) 40 MG tablet Take 40 mg by mouth once daily.    sucralfate (CARAFATE) 100 mg/mL suspension Take 1 g by mouth 4 (four) times daily.    sulfamethoxazole-trimethoprim 800-160mg (BACTRIM DS) 800-160 mg Tab Take 1 tablet by mouth 2 (two) times daily. for 7 days    baclofen (LIORESAL) 10 MG tablet     benzonatate (TESSALON) 100 MG capsule     calcium carbonate (CALCIUM ANTACID) 300 mg (750 mg) Chew Take by mouth 2 (two) times daily as needed.    diphenhydrAMINE (BENADRYL) 25 mg capsule Take 25 mg by mouth nightly.    fluticasone-vilanterol (BREO ELLIPTA) 100-25 mcg/dose diskus inhaler 1 puff daily    ketoconazole (NIZORAL) 2 % cream Apply to affected area daily    levoFLOXacin (LEVAQUIN) 500 MG tablet Take 1 tablet (500 mg total) by mouth once daily.    nicotine 21-14-7 mg/24 hr PTDS     omeprazole (PRILOSEC) 40 MG capsule TAKE 1 CAPSULE BY MOUTH TWICE DAILY BEFORE MEALS    ondansetron (ZOFRAN-ODT) 4 MG TbDL Take 1 tablet (4 mg  total) by mouth every 8 (eight) hours as needed.    potassium chloride SA (K-DUR,KLOR-CON) 20 MEQ tablet Take 20 mEq by mouth 2 (two) times daily.     Family History     Problem Relation (Age of Onset)    Breast cancer Mother    Cancer Mother    Kidney disease Mother    Liver disease Mother        Tobacco Use    Smoking status: Former Smoker     Packs/day: 1.00     Types: Cigarettes     Last attempt to quit: 2018     Years since quittin.6    Smokeless tobacco: Never Used    Tobacco comment: patches    Substance and Sexual Activity    Alcohol use: No    Drug use: No    Sexual activity: Not Currently     Partners: Male     Review of Systems   Constitutional: Negative for chills, diaphoresis, fatigue, fever and unexpected weight change.   HENT: Negative for congestion, facial swelling, sore throat and trouble swallowing.    Eyes: Negative for photophobia, redness and visual disturbance.   Respiratory: Negative for apnea, cough, chest tightness, shortness of breath and wheezing.    Cardiovascular: Negative for chest pain, palpitations and leg swelling.   Gastrointestinal: Negative for abdominal distention, abdominal pain, blood in stool, constipation, diarrhea, nausea and vomiting.   Endocrine: Negative for polydipsia, polyphagia and polyuria.   Genitourinary: Negative for difficulty urinating, dysuria, flank pain, frequency, hematuria and urgency.   Musculoskeletal: Negative for arthralgias, back pain, joint swelling, myalgias and neck stiffness.   Skin: Positive for rash (RLE) and wound. Negative for pallor.   Allergic/Immunologic: Negative for immunocompromised state.   Neurological: Negative for dizziness, tremors, syncope, weakness, light-headedness and headaches.   Psychiatric/Behavioral: Negative for agitation, confusion and suicidal ideas.   All other systems reviewed and are negative.    Objective:     Vital Signs (Most Recent):  Temp: 98.4 °F (36.9 °C) (18 0044)  Pulse: 88 (18  0230)  Resp: (!) 28 (09/18/18 0230)  BP: 106/63 (09/18/18 0230)  SpO2: 96 % (09/18/18 0230) Vital Signs (24h Range):  Temp:  [98.4 °F (36.9 °C)] 98.4 °F (36.9 °C)  Pulse:  [] 88  Resp:  [20-35] 28  SpO2:  [94 %-99 %] 96 %  BP: (106-132)/(63-84) 106/63     Weight: 120.7 kg (266 lb 1.5 oz)  Body mass index is 45.68 kg/m².    Physical Exam   Constitutional: She is oriented to person, place, and time. She appears well-developed and well-nourished. No distress.   HENT:   Head: Normocephalic and atraumatic.   Mouth/Throat: Oropharynx is clear and moist.   Eyes: Conjunctivae and EOM are normal. Pupils are equal, round, and reactive to light. No scleral icterus.   Neck: Normal range of motion. Neck supple. No JVD present. No thyromegaly present.   Cardiovascular: Normal rate, regular rhythm and intact distal pulses. Exam reveals no gallop and no friction rub.   No murmur heard.  Pulmonary/Chest: Effort normal and breath sounds normal. No respiratory distress. She has no wheezes. She has no rales. She exhibits no tenderness.   Abdominal: Soft. Bowel sounds are normal. She exhibits no distension and no mass. There is no tenderness. There is no guarding.   Musculoskeletal: Normal range of motion. She exhibits edema. She exhibits no tenderness.   Neurological: She is alert and oriented to person, place, and time. No cranial nerve deficit.   Skin: Skin is warm and dry. Capillary refill takes 2 to 3 seconds. Rash noted. She is not diaphoretic. There is erythema.   Psychiatric: She has a normal mood and affect.   Nursing note and vitals reviewed.        CRANIAL NERVES     CN III, IV, VI   Pupils are equal, round, and reactive to light.  Extraocular motions are normal.        Significant Labs:   CBC:   Recent Labs   Lab  09/16/18 2042 09/18/18   0019   WBC  12.12  11.31   HGB  12.0  12.3   HCT  36.9*  37.4   PLT  264  288     CMP:   Recent Labs   Lab  09/16/18   2042  09/18/18   0032   NA  139  138   K  3.6  4.5   CL  104   103   CO2  23  23   GLU  106  93   BUN  13  6   CREATININE  0.8  0.8   CALCIUM  8.6*  8.8   PROT  6.9  7.8   ALBUMIN  3.4*  3.4*   BILITOT  0.3  0.5   ALKPHOS  118  167*   AST  20  38   ALT  18  21   ANIONGAP  12  12   EGFRNONAA  >60  >60     Lactic Acid:   Recent Labs   Lab  09/18/18   0019   LACTATE  1.7     Magnesium: No results for input(s): MG in the last 48 hours.  Troponin: No results for input(s): TROPONINI in the last 48 hours.  TSH:   Recent Labs   Lab  06/26/18   0053   TSH  1.725     Urine Studies:   Recent Labs   Lab  09/18/18   0005   COLORU  Yellow   APPEARANCEUA  Clear   PHUR  7.0   SPECGRAV  1.010   PROTEINUA  Negative   GLUCUA  Negative   KETONESU  Negative   BILIRUBINUA  Negative   OCCULTUA  Negative   NITRITE  Negative   UROBILINOGEN  1.0   LEUKOCYTESUR  Negative     All pertinent labs within the past 24 hours have been reviewed.    Significant Imaging: I have reviewed all pertinent imaging results/findings within the past 24 hours.   Imaging Results          X-Ray Chest AP Portable (In process)                   Assessment/Plan:     * Cellulitis    - not improved on po bactrim  - continue vanc/zosyn  - follow up blood cx          COPD (chronic obstructive pulmonary disease)    - continue formoterol/budesonide q12h, duoneb q6h prn sob/wheezing  - recommend tobacco cessation          Tobacco abuse    - recommend cessation            VTE Risk Mitigation (From admission, onward)        Ordered     enoxaparin injection 40 mg  Daily      09/18/18 0229     IP VTE HIGH RISK PATIENT  Once      09/18/18 0229     Place DEBRA hose  Until discontinued      09/18/18 0213             Mark Gusman MD  Department of Hospital Medicine   [facility]

## 2018-09-18 NOTE — CONSULTS
"Consulted on this 47 y/o F patient due to cellulitis and abscess to Right lower medial shin. Patient reports hitting her leg 2 weeks ago with progressive worsening or edema and pain since that time. She states the abscess "opened" spontaneously and started draining 2 days ago.  She is currently lying in bed with RLE elevated on pillow. She has tight 4+ edema to RLE. Wound measures 2x2cm with small amount purulent drainage. Wound is ecchymotic in appearance, +induration, but no fluctuance.  Star wound erythema noted extending out 4cm.  Star wound blistering also noted with intact serous fluid filled blister noted at 5 o'clock. Cleansed with saline and patted dry.  Painted with cavilon.  Foam dressing applied to contain drainage. Patient will need surgery/ortho eval for I&D if does not improve quickly with IV antibiotics. Please see below for wound care recommendations:    Right shin abscess/cellulitis:  1. Cleanse with saline  2. Pat dry  3. Paint intact stra wound skin with cavilon  4. Apply foam lite dressing  5. Change every 2 days and prn  6. Monitor every shift for worsening cellulitis and increased drainage and notify primary MD if develops  7. Monitor every 4 hours for s/s compartment syndrome  8. Elevate RLE on 2 pillows      Addendum: plan to discharge home later today per primary team. Wound care for home will consist of cleansing with antibacterial soap and water. Apply antibiotic ointment and cover with large bandaid.  Reviewed with patient.                "

## 2018-09-18 NOTE — PLAN OF CARE
Patient awake alert oriented x4. Patient has no current c/o pain. Discharge instructions reviewed with patient verbal understanding given at this time. Patients IV discontinued at this time along with Tele monitor. Patient awaiting family to arrive and wheelchair to be wheeled to car. Patient instructed to call when family arrives for wheelchair.

## 2018-09-18 NOTE — SUBJECTIVE & OBJECTIVE
Past Medical History:   Diagnosis Date    COPD (chronic obstructive pulmonary disease)     Depression     GERD (gastroesophageal reflux disease)     Hypercholesteremia     Hypertension     Kidney stone     Stroke     Tobacco use        Past Surgical History:   Procedure Laterality Date    HYSTERECTOMY      KNEE ARTHROSCOPY W/ ACL RECONSTRUCTION Left     LITHOTRIPSY         Review of patient's allergies indicates:  No Known Allergies    No current facility-administered medications on file prior to encounter.      Current Outpatient Medications on File Prior to Encounter   Medication Sig    albuterol 90 mcg/actuation inhaler Inhale 2 puffs into the lungs every 4 (four) hours as needed for Wheezing.    ALPRAZolam (XANAX) 0.25 MG tablet Take 1 tablet (0.25 mg total) by mouth 3 (three) times daily as needed for Anxiety.    aspirin (ECOTRIN) 325 MG EC tablet Take 325 mg by mouth once daily.    atorvastatin (LIPITOR) 40 MG tablet TAKE 1 TABLET BY MOUTH NIGHTLY AVOID GRAPEFRUIT    carvedilol (COREG) 12.5 MG tablet Take 12.5 mg by mouth 2 (two) times daily.     DULoxetine (CYMBALTA) 60 MG capsule Take 60 mg by mouth once daily.    furosemide (LASIX) 20 MG tablet Take 1 tablet (20 mg total) by mouth once daily. (Patient taking differently: Take 20 mg by mouth 2 (two) times daily. )    gabapentin (NEURONTIN) 600 MG tablet Take 600 mg by mouth 2 (two) times daily.    HYDROcodone-acetaminophen (NORCO) 5-325 mg per tablet Take 1 tablet by mouth every 6 (six) hours as needed for Pain.    nicotine polacrilex 2 MG Lozg Take 2 mg by mouth as needed.    pantoprazole (PROTONIX) 40 MG tablet Take 40 mg by mouth once daily.    sucralfate (CARAFATE) 100 mg/mL suspension Take 1 g by mouth 4 (four) times daily.    sulfamethoxazole-trimethoprim 800-160mg (BACTRIM DS) 800-160 mg Tab Take 1 tablet by mouth 2 (two) times daily. for 7 days    baclofen (LIORESAL) 10 MG tablet     benzonatate (TESSALON) 100 MG capsule      calcium carbonate (CALCIUM ANTACID) 300 mg (750 mg) Chew Take by mouth 2 (two) times daily as needed.    diphenhydrAMINE (BENADRYL) 25 mg capsule Take 25 mg by mouth nightly.    fluticasone-vilanterol (BREO ELLIPTA) 100-25 mcg/dose diskus inhaler 1 puff daily    ketoconazole (NIZORAL) 2 % cream Apply to affected area daily    levoFLOXacin (LEVAQUIN) 500 MG tablet Take 1 tablet (500 mg total) by mouth once daily.    nicotine 21-14-7 mg/24 hr PTDS     omeprazole (PRILOSEC) 40 MG capsule TAKE 1 CAPSULE BY MOUTH TWICE DAILY BEFORE MEALS    ondansetron (ZOFRAN-ODT) 4 MG TbDL Take 1 tablet (4 mg total) by mouth every 8 (eight) hours as needed.    potassium chloride SA (K-DUR,KLOR-CON) 20 MEQ tablet Take 20 mEq by mouth 2 (two) times daily.     Family History     Problem Relation (Age of Onset)    Breast cancer Mother    Cancer Mother    Kidney disease Mother    Liver disease Mother        Tobacco Use    Smoking status: Former Smoker     Packs/day: 1.00     Types: Cigarettes     Last attempt to quit: 2018     Years since quittin.6    Smokeless tobacco: Never Used    Tobacco comment: patches    Substance and Sexual Activity    Alcohol use: No    Drug use: No    Sexual activity: Not Currently     Partners: Male     Review of Systems   Constitutional: Negative for chills, diaphoresis, fatigue, fever and unexpected weight change.   HENT: Negative for congestion, facial swelling, sore throat and trouble swallowing.    Eyes: Negative for photophobia, redness and visual disturbance.   Respiratory: Negative for apnea, cough, chest tightness, shortness of breath and wheezing.    Cardiovascular: Negative for chest pain, palpitations and leg swelling.   Gastrointestinal: Negative for abdominal distention, abdominal pain, blood in stool, constipation, diarrhea, nausea and vomiting.   Endocrine: Negative for polydipsia, polyphagia and polyuria.   Genitourinary: Negative for difficulty urinating, dysuria, flank  pain, frequency, hematuria and urgency.   Musculoskeletal: Negative for arthralgias, back pain, joint swelling, myalgias and neck stiffness.   Skin: Positive for rash (RLE) and wound. Negative for pallor.   Allergic/Immunologic: Negative for immunocompromised state.   Neurological: Negative for dizziness, tremors, syncope, weakness, light-headedness and headaches.   Psychiatric/Behavioral: Negative for agitation, confusion and suicidal ideas.   All other systems reviewed and are negative.    Objective:     Vital Signs (Most Recent):  Temp: 98.4 °F (36.9 °C) (09/18/18 0044)  Pulse: 88 (09/18/18 0230)  Resp: (!) 28 (09/18/18 0230)  BP: 106/63 (09/18/18 0230)  SpO2: 96 % (09/18/18 0230) Vital Signs (24h Range):  Temp:  [98.4 °F (36.9 °C)] 98.4 °F (36.9 °C)  Pulse:  [] 88  Resp:  [20-35] 28  SpO2:  [94 %-99 %] 96 %  BP: (106-132)/(63-84) 106/63     Weight: 120.7 kg (266 lb 1.5 oz)  Body mass index is 45.68 kg/m².    Physical Exam   Constitutional: She is oriented to person, place, and time. She appears well-developed and well-nourished. No distress.   HENT:   Head: Normocephalic and atraumatic.   Mouth/Throat: Oropharynx is clear and moist.   Eyes: Conjunctivae and EOM are normal. Pupils are equal, round, and reactive to light. No scleral icterus.   Neck: Normal range of motion. Neck supple. No JVD present. No thyromegaly present.   Cardiovascular: Normal rate, regular rhythm and intact distal pulses. Exam reveals no gallop and no friction rub.   No murmur heard.  Pulmonary/Chest: Effort normal and breath sounds normal. No respiratory distress. She has no wheezes. She has no rales. She exhibits no tenderness.   Abdominal: Soft. Bowel sounds are normal. She exhibits no distension and no mass. There is no tenderness. There is no guarding.   Musculoskeletal: Normal range of motion. She exhibits edema. She exhibits no tenderness.   Neurological: She is alert and oriented to person, place, and time. No cranial nerve  deficit.   Skin: Skin is warm and dry. Capillary refill takes 2 to 3 seconds. Rash noted. She is not diaphoretic. There is erythema.   Psychiatric: She has a normal mood and affect.   Nursing note and vitals reviewed.        CRANIAL NERVES     CN III, IV, VI   Pupils are equal, round, and reactive to light.  Extraocular motions are normal.        Significant Labs:   CBC:   Recent Labs   Lab  09/16/18 2042 09/18/18   0019   WBC  12.12  11.31   HGB  12.0  12.3   HCT  36.9*  37.4   PLT  264  288     CMP:   Recent Labs   Lab  09/16/18 2042 09/18/18   0032   NA  139  138   K  3.6  4.5   CL  104  103   CO2  23  23   GLU  106  93   BUN  13  6   CREATININE  0.8  0.8   CALCIUM  8.6*  8.8   PROT  6.9  7.8   ALBUMIN  3.4*  3.4*   BILITOT  0.3  0.5   ALKPHOS  118  167*   AST  20  38   ALT  18  21   ANIONGAP  12  12   EGFRNONAA  >60  >60     Lactic Acid:   Recent Labs   Lab  09/18/18   0019   LACTATE  1.7     Magnesium: No results for input(s): MG in the last 48 hours.  Troponin: No results for input(s): TROPONINI in the last 48 hours.  TSH:   Recent Labs   Lab  06/26/18   0053   TSH  1.725     Urine Studies:   Recent Labs   Lab  09/18/18   0005   COLORU  Yellow   APPEARANCEUA  Clear   PHUR  7.0   SPECGRAV  1.010   PROTEINUA  Negative   GLUCUA  Negative   KETONESU  Negative   BILIRUBINUA  Negative   OCCULTUA  Negative   NITRITE  Negative   UROBILINOGEN  1.0   LEUKOCYTESUR  Negative     All pertinent labs within the past 24 hours have been reviewed.    Significant Imaging: I have reviewed all pertinent imaging results/findings within the past 24 hours.   Imaging Results          X-Ray Chest AP Portable (In process)

## 2018-09-18 NOTE — PROGRESS NOTES
To transfer to TELE rm 222.  Discussed POC and interventions with patient - VU.  Family member accompanying patient up to room - is attentive to patient.  Vitals stable.  Analgesic recently given for RLE pain - see MAR.

## 2018-09-19 ENCOUNTER — HOSPITAL ENCOUNTER (EMERGENCY)
Facility: HOSPITAL | Age: 48
Discharge: HOME | End: 2018-09-20
Attending: FAMILY MEDICINE
Payer: MEDICAID

## 2018-09-19 DIAGNOSIS — L03.90 CELLULITIS, UNSPECIFIED CELLULITIS SITE: Primary | ICD-10-CM

## 2018-09-19 LAB
ALBUMIN SERPL BCP-MCNC: 3.2 G/DL
ALP SERPL-CCNC: 190 U/L
ALT SERPL W/O P-5'-P-CCNC: 13 U/L
AMPHET+METHAMPHET UR QL: NEGATIVE
ANION GAP SERPL CALC-SCNC: 10 MMOL/L
AST SERPL-CCNC: 18 U/L
BARBITURATES UR QL SCN>200 NG/ML: NEGATIVE
BASOPHILS # BLD AUTO: 0.04 K/UL
BASOPHILS NFR BLD: 0.4 %
BENZODIAZ UR QL SCN>200 NG/ML: NORMAL
BILIRUB SERPL-MCNC: 0.6 MG/DL
BILIRUB UR QL STRIP: NEGATIVE
BUN SERPL-MCNC: 5 MG/DL
BZE UR QL SCN: NEGATIVE
CALCIUM SERPL-MCNC: 9 MG/DL
CANNABINOIDS UR QL SCN: NEGATIVE
CHLORIDE SERPL-SCNC: 102 MMOL/L
CLARITY UR: CLEAR
CO2 SERPL-SCNC: 26 MMOL/L
COLOR UR: YELLOW
CREAT SERPL-MCNC: 0.7 MG/DL
CREAT UR-MCNC: 27.9 MG/DL
DIFFERENTIAL METHOD: ABNORMAL
EOSINOPHIL # BLD AUTO: 0.2 K/UL
EOSINOPHIL NFR BLD: 2.3 %
ERYTHROCYTE [DISTWIDTH] IN BLOOD BY AUTOMATED COUNT: 14.3 %
EST. GFR  (AFRICAN AMERICAN): >60 ML/MIN/1.73 M^2
EST. GFR  (NON AFRICAN AMERICAN): >60 ML/MIN/1.73 M^2
GLUCOSE SERPL-MCNC: 103 MG/DL
GLUCOSE UR QL STRIP: NEGATIVE
HCT VFR BLD AUTO: 37.8 %
HGB BLD-MCNC: 12.3 G/DL
HGB UR QL STRIP: NEGATIVE
KETONES UR QL STRIP: NEGATIVE
LEUKOCYTE ESTERASE UR QL STRIP: NEGATIVE
LYMPHOCYTES # BLD AUTO: 1.3 K/UL
LYMPHOCYTES NFR BLD: 14.3 %
MCH RBC QN AUTO: 29.5 PG
MCHC RBC AUTO-ENTMCNC: 32.5 G/DL
MCV RBC AUTO: 91 FL
METHADONE UR QL SCN>300 NG/ML: NEGATIVE
MONOCYTES # BLD AUTO: 0.7 K/UL
MONOCYTES NFR BLD: 7.3 %
NEUTROPHILS # BLD AUTO: 7 K/UL
NEUTROPHILS NFR BLD: 75.7 %
NITRITE UR QL STRIP: NEGATIVE
OPIATES UR QL SCN: NORMAL
PCP UR QL SCN>25 NG/ML: NEGATIVE
PH UR STRIP: 7 [PH] (ref 5–8)
PLATELET # BLD AUTO: 337 K/UL
PMV BLD AUTO: 10 FL
POTASSIUM SERPL-SCNC: 3 MMOL/L
PROT SERPL-MCNC: 7.1 G/DL
PROT UR QL STRIP: NEGATIVE
RBC # BLD AUTO: 4.17 M/UL
SODIUM SERPL-SCNC: 138 MMOL/L
SP GR UR STRIP: 1.01 (ref 1–1.03)
TOXICOLOGY INFORMATION: NORMAL
URN SPEC COLLECT METH UR: NORMAL
UROBILINOGEN UR STRIP-ACNC: 1 EU/DL
WBC # BLD AUTO: 9.29 K/UL

## 2018-09-19 PROCEDURE — 80307 DRUG TEST PRSMV CHEM ANLYZR: CPT

## 2018-09-19 PROCEDURE — 80053 COMPREHEN METABOLIC PANEL: CPT

## 2018-09-19 PROCEDURE — 96366 THER/PROPH/DIAG IV INF ADDON: CPT

## 2018-09-19 PROCEDURE — 36415 COLL VENOUS BLD VENIPUNCTURE: CPT

## 2018-09-19 PROCEDURE — 25000003 PHARM REV CODE 250: Performed by: FAMILY MEDICINE

## 2018-09-19 PROCEDURE — 96375 TX/PRO/DX INJ NEW DRUG ADDON: CPT

## 2018-09-19 PROCEDURE — 81003 URINALYSIS AUTO W/O SCOPE: CPT | Mod: 59

## 2018-09-19 PROCEDURE — 63600175 PHARM REV CODE 636 W HCPCS: Performed by: FAMILY MEDICINE

## 2018-09-19 PROCEDURE — 96365 THER/PROPH/DIAG IV INF INIT: CPT

## 2018-09-19 PROCEDURE — 99284 EMERGENCY DEPT VISIT MOD MDM: CPT | Mod: 25

## 2018-09-19 PROCEDURE — 85025 COMPLETE CBC W/AUTO DIFF WBC: CPT

## 2018-09-19 RX ORDER — VANCOMYCIN HCL IN 5 % DEXTROSE 1G/250ML
1000 PLASTIC BAG, INJECTION (ML) INTRAVENOUS
Status: COMPLETED | OUTPATIENT
Start: 2018-09-19 | End: 2018-09-20

## 2018-09-19 RX ORDER — FUROSEMIDE 10 MG/ML
40 INJECTION INTRAMUSCULAR; INTRAVENOUS
Status: COMPLETED | OUTPATIENT
Start: 2018-09-19 | End: 2018-09-19

## 2018-09-19 RX ORDER — KETOROLAC TROMETHAMINE 30 MG/ML
30 INJECTION, SOLUTION INTRAMUSCULAR; INTRAVENOUS
Status: COMPLETED | OUTPATIENT
Start: 2018-09-19 | End: 2018-09-19

## 2018-09-19 RX ORDER — POTASSIUM CHLORIDE 20 MEQ/15ML
20 SOLUTION ORAL
Status: COMPLETED | OUTPATIENT
Start: 2018-09-19 | End: 2018-09-19

## 2018-09-19 RX ORDER — MORPHINE SULFATE 4 MG/ML
4 INJECTION, SOLUTION INTRAMUSCULAR; INTRAVENOUS
Status: COMPLETED | OUTPATIENT
Start: 2018-09-19 | End: 2018-09-19

## 2018-09-19 RX ADMIN — POTASSIUM CHLORIDE 20 MEQ: 20 SOLUTION ORAL at 11:09

## 2018-09-19 RX ADMIN — FUROSEMIDE 40 MG: 10 INJECTION, SOLUTION INTRAMUSCULAR; INTRAVENOUS at 11:09

## 2018-09-19 RX ADMIN — VANCOMYCIN HYDROCHLORIDE 1000 MG: 1 INJECTION, POWDER, LYOPHILIZED, FOR SOLUTION INTRAVENOUS at 10:09

## 2018-09-19 RX ADMIN — MORPHINE SULFATE 4 MG: 4 INJECTION INTRAVENOUS at 10:09

## 2018-09-19 RX ADMIN — KETOROLAC TROMETHAMINE 30 MG: 30 INJECTION INTRAMUSCULAR; INTRAVENOUS at 11:09

## 2018-09-20 VITALS
HEIGHT: 64 IN | RESPIRATION RATE: 17 BRPM | OXYGEN SATURATION: 99 % | SYSTOLIC BLOOD PRESSURE: 129 MMHG | TEMPERATURE: 99 F | DIASTOLIC BLOOD PRESSURE: 85 MMHG | BODY MASS INDEX: 45.68 KG/M2 | HEART RATE: 69 BPM

## 2018-09-20 NOTE — ED NOTES
Pt was discharged yesterday and for cellulitis and a wound to her right lower leg. Pt reports the leg has gotten worse since yesterday and the pain is so unbearable she can't sleep. Denies fever, chills, SOB, abd pain, CP, numbness, or tingling. No other sx reported

## 2018-09-20 NOTE — ED NOTES
The pt will receive the ketorolac just prior to discharge for pain control and comfort. The pt is hysterically crying from the pain in her leg and upset because she will be up all night urinating.

## 2018-09-20 NOTE — ED PROVIDER NOTES
"SCRIBE #1 NOTE: I, Salima Barroso, am scribing for, and in the presence of, Tracie Keen MD. I have scribed the entire note.      History      Chief Complaint   Patient presents with    Cellulitis     cellulitis RLE; "I was just discharged yesterday from here but it's worse"       Review of patient's allergies indicates:  No Known Allergies     HPI   HPI    9/19/2018, 8:08 PM   History obtained from the patient      History of Present Illness: Valorie Monzon is a 48 y.o. female patient with a PMHx of COPD who presents to the Emergency Department for increased RLE pain at wound site with associated erythema and swelling that progressively worsened today. Pt reports falling out of the bed 1 week ago and hitting her RLE on a nightstand. Pt was evaluated in the ED for similar sxs on 09/16/2018, was dx with cellulitis, and discharged with Bactrim and Norco. Pt also presented to ED on 09/17/2018 for cellulitis, but with worsening sxs, was admitted for observation, received IV Vanc and Zosyn, and discharged on 09/18/2018. Symptoms are constant and moderate in severity. Sxs exacerbated with movement of RLE. No exacerbating factors reported. No associated sxs reported. Patient denies any fever, chills, n/v, extremity weakness/numbness, rash, HA, fatigue, dizziness, and all other sxs at this time. No further complaints or concerns at this time.          Arrival mode: Personal vehicle    PCP: Karri Levy MD       Past Medical History:  Past Medical History:   Diagnosis Date    COPD (chronic obstructive pulmonary disease)     Depression     GERD (gastroesophageal reflux disease)     Hypercholesteremia     Hypertension     Kidney stone     Stroke     Tobacco use        Past Surgical History:  Past Surgical History:   Procedure Laterality Date    HYSTERECTOMY      KNEE ARTHROSCOPY W/ ACL RECONSTRUCTION Left     LITHOTRIPSY           Family History:  Family History   Problem Relation Age of Onset    " Kidney disease Mother     Cancer Mother     Liver disease Mother     Breast cancer Mother        Social History:  Social History     Tobacco Use    Smoking status: Former Smoker     Packs/day: 1.00     Types: Cigarettes     Last attempt to quit: 2018     Years since quittin.6    Smokeless tobacco: Never Used    Tobacco comment: patches    Substance and Sexual Activity    Alcohol use: No    Drug use: No    Sexual activity: Not Currently     Partners: Male       ROS   Review of Systems   Constitutional: Negative for chills, fatigue and fever.   HENT: Negative for sore throat.    Respiratory: Negative for shortness of breath.    Cardiovascular: Negative for chest pain.   Gastrointestinal: Negative for nausea and vomiting.   Genitourinary: Negative for dysuria.   Musculoskeletal: Negative for back pain.   Skin: Positive for wound (to RLE with swelling and erythema). Negative for rash.        + RLE pain   Neurological: Negative for dizziness, weakness, numbness and headaches.   Hematological: Does not bruise/bleed easily.   All other systems reviewed and are negative.      Physical Exam      Initial Vitals [18 1823]   BP Pulse Resp Temp SpO2   (!) 143/65 100 20 99.2 °F (37.3 °C) 98 %      MAP       --          Physical Exam  Nursing Notes and Vital Signs Reviewed.  Constitutional: Patient is in no acute distress. Well-developed and well-nourished.  Head: Atraumatic. Normocephalic.  Eyes: PERRL. EOM intact. Conjunctivae are not pale. No scleral icterus.  ENT: Mucous membranes are moist. Oropharynx is clear and symmetric.    Neck: Supple. Full ROM. No lymphadenopathy.  Cardiovascular: Regular rate. Regular rhythm. No murmurs, rubs, or gallops. Distal pulses are 2+ and symmetric.  Pulmonary/Chest: No respiratory distress. Clear to auscultation bilaterally. No wheezing or rales.  Abdominal: Soft and non-distended.  There is no tenderness.  No rebound, guarding, or rigidity.   Musculoskeletal: Moves all  "extremities. No obvious deformities. 2+ BLE edema.  RLE: no evident deformity. There is a quarter size ulceration to medial aspect of RLE that is macerated with fibrinous exudate. No purulent discharge. No bleeding. Minimal drainage. Periwound area surrounded with erythema extended to medial aspect of calf and down over anterior ankle. No fluctuance. Good perfusion. ROM is normal. Cap refill distally is <2 seconds. DP and PT pulses are equal and 2+ bilaterally. No motor deficit. No distal sensory deficit  Skin: Warm and dry.  Neurological:  Alert, awake, and appropriate.  Normal speech.  No acute focal neurological deficits are appreciated.  Psychiatric: Normal affect. Good eye contact. Appropriate in content.    ED Course    Procedures  ED Vital Signs:  Vitals:    09/19/18 1823 09/19/18 2105 09/19/18 2106   BP: (!) 143/65  123/87   Pulse: 100 98 93   Resp: 20     Temp: 99.2 °F (37.3 °C)     TempSrc: Tympanic     SpO2: 98%  98%   Height: 5' 4" (1.626 m)         Abnormal Lab Results:  Labs Reviewed   CBC W/ AUTO DIFFERENTIAL - Abnormal; Notable for the following components:       Result Value    Gran% 75.7 (*)     Lymph% 14.3 (*)     All other components within normal limits   COMPREHENSIVE METABOLIC PANEL - Abnormal; Notable for the following components:    Potassium 3.0 (*)     BUN, Bld 5 (*)     Albumin 3.2 (*)     Alkaline Phosphatase 190 (*)     All other components within normal limits   URINALYSIS   DRUG SCREEN PANEL, URINE EMERGENCY        All Lab Results:  Results for orders placed or performed during the hospital encounter of 09/19/18   CBC auto differential   Result Value Ref Range    WBC 9.29 3.90 - 12.70 K/uL    RBC 4.17 4.00 - 5.40 M/uL    Hemoglobin 12.3 12.0 - 16.0 g/dL    Hematocrit 37.8 37.0 - 48.5 %    MCV 91 82 - 98 fL    MCH 29.5 27.0 - 31.0 pg    MCHC 32.5 32.0 - 36.0 g/dL    RDW 14.3 11.5 - 14.5 %    Platelets 337 150 - 350 K/uL    MPV 10.0 9.2 - 12.9 fL    Gran # (ANC) 7.0 1.8 - 7.7 K/uL    " Lymph # 1.3 1.0 - 4.8 K/uL    Mono # 0.7 0.3 - 1.0 K/uL    Eos # 0.2 0.0 - 0.5 K/uL    Baso # 0.04 0.00 - 0.20 K/uL    Gran% 75.7 (H) 38.0 - 73.0 %    Lymph% 14.3 (L) 18.0 - 48.0 %    Mono% 7.3 4.0 - 15.0 %    Eosinophil% 2.3 0.0 - 8.0 %    Basophil% 0.4 0.0 - 1.9 %    Differential Method Automated    Comprehensive metabolic panel   Result Value Ref Range    Sodium 138 136 - 145 mmol/L    Potassium 3.0 (L) 3.5 - 5.1 mmol/L    Chloride 102 95 - 110 mmol/L    CO2 26 23 - 29 mmol/L    Glucose 103 70 - 110 mg/dL    BUN, Bld 5 (L) 6 - 20 mg/dL    Creatinine 0.7 0.5 - 1.4 mg/dL    Calcium 9.0 8.7 - 10.5 mg/dL    Total Protein 7.1 6.0 - 8.4 g/dL    Albumin 3.2 (L) 3.5 - 5.2 g/dL    Total Bilirubin 0.6 0.1 - 1.0 mg/dL    Alkaline Phosphatase 190 (H) 55 - 135 U/L    AST 18 10 - 40 U/L    ALT 13 10 - 44 U/L    Anion Gap 10 8 - 16 mmol/L    eGFR if African American >60 >60 mL/min/1.73 m^2    eGFR if non African American >60 >60 mL/min/1.73 m^2   Urinalysis - Clean Catch   Result Value Ref Range    Specimen UA Urine, Clean Catch     Color, UA Yellow Yellow, Straw, Ana    Appearance, UA Clear Clear    pH, UA 7.0 5.0 - 8.0    Specific Gravity, UA 1.010 1.005 - 1.030    Protein, UA Negative Negative    Glucose, UA Negative Negative    Ketones, UA Negative Negative    Bilirubin (UA) Negative Negative    Occult Blood UA Negative Negative    Nitrite, UA Negative Negative    Urobilinogen, UA 1.0 <2.0 EU/dL    Leukocytes, UA Negative Negative   Drug screen panel, emergency   Result Value Ref Range    Benzodiazepines Presumptive Positive     Methadone metabolites Negative     Cocaine (Metab.) Negative     Opiate Scrn, Ur Presumptive Positive     Barbiturate Screen, Ur Negative     Amphetamine Screen, Ur Negative     THC Negative     Phencyclidine Negative     Creatinine, Random Ur 27.9 15.0 - 325.0 mg/dL    Toxicology Information SEE COMMENT          Imaging Results:  Imaging Results    None                 The Emergency Provider  reviewed the vital signs and test results, which are outlined above.    ED Discussion     10:50 PM: Discussed case with Kwabena Cabezas NP ([facility]).    10:52 PM: Kwabena Cabezas NP is evaluating the pt at this time.  offered pt admission for observation and further monitoring. Pt refused admission. Pt is repeatedly asking for pain medication. Pt's BLE is chronically edematous. Pt's leg was marked and given return to the ED instructions.      11:01 PM: Dr. Keen reassessed pt at this time.  Pt is awake, alert, and in NAD at this time. Discussed with pt all pertinent ED information and results. Discussed pt dx and plan of tx. Gave pt all f/u and return to the ED instructions. All questions and concerns were addressed at this time. Pt expresses understanding of information and instructions, and is comfortable with plan to discharge. Pt is stable for discharge.    I discussed wound care precautions with patient and/or family/caretaker; specifically that all wounds have risk of infection despite efforts to cleanse and debride the wound; and there is a risk of an occult foreign body (and thus increased risk of infection) despite a negative examination.  I discussed with patient need to return for any signs of infection, specifically redness, increased pain, fever, drainage of pus, or any concern, immediately.    I discussed with patient and/or family/caretaker that evaluation in the ED does not suggest any emergent or life threatening medical conditions requiring immediate intervention beyond what was provided in the ED, and I believe patient is safe for discharge.  Regardless, an unremarkable evaluation in the ED does not preclude the development or presence of a serious of life threatening condition. As such, patient was instructed to return immediately for any worsening or change in current symptoms.        ED Medication(s):  Medications   potassium chloride 10% oral solution 20 mEq (not administered)    furosemide injection 40 mg (not administered)   vancomycin in dextrose 5 % 1 gram/250 mL IVPB 1,000 mg (1,000 mg Intravenous New Bag 9/19/18 2201)   morphine injection 4 mg (4 mg Intravenous Given 9/19/18 2201)             Medical Decision Making    Medical Decision Making:   Clinical Tests:   Lab Tests: Ordered and Reviewed           Scribe Attestation:   Scribe #1: I performed the above scribed service and the documentation accurately describes the services I performed. I attest to the accuracy of the note.    Attending:   Physician Attestation Statement for Scribe #1: I, Tracie Keen MD, personally performed the services described in this documentation, as scribed by Salima Barroso, in my presence, and it is both accurate and complete.          Clinical Impression       ICD-10-CM ICD-9-CM   1. Cellulitis, unspecified cellulitis site L03.90 682.9       Disposition:   Disposition: Discharged  Condition: Stable         Tracie Keen MD  09/21/18 1009

## 2018-09-20 NOTE — ED NOTES
Pts leg was cleaned with hibiclens and dressed with nonstick dressing and tegaderm. The pt and her significant other was also given care instructions

## 2018-09-20 NOTE — ED NOTES
The pt was given a specimen cup and wipe with clean catch instructions. Waiting for urine specimen

## 2018-09-23 LAB
BACTERIA BLD CULT: NORMAL
BACTERIA BLD CULT: NORMAL

## 2018-12-15 ENCOUNTER — HOSPITAL ENCOUNTER (INPATIENT)
Facility: HOSPITAL | Age: 48
LOS: 3 days | Discharge: HOME | DRG: 065 | End: 2018-12-18
Attending: FAMILY MEDICINE | Admitting: INTERNAL MEDICINE
Payer: MEDICAID

## 2018-12-15 DIAGNOSIS — F19.90 ILLICIT DRUG USE: ICD-10-CM

## 2018-12-15 DIAGNOSIS — R47.81 SLURRED SPEECH: ICD-10-CM

## 2018-12-15 DIAGNOSIS — R41.82 ALTERED MENTAL STATE: Primary | ICD-10-CM

## 2018-12-15 DIAGNOSIS — G45.9 TIA (TRANSIENT ISCHEMIC ATTACK): ICD-10-CM

## 2018-12-15 DIAGNOSIS — I63.512 ACUTE ISCHEMIC LEFT MCA STROKE: ICD-10-CM

## 2018-12-15 PROBLEM — F41.1 GAD (GENERALIZED ANXIETY DISORDER): Status: ACTIVE | Noted: 2018-12-15

## 2018-12-15 PROBLEM — G93.40 ACUTE ENCEPHALOPATHY: Status: ACTIVE | Noted: 2018-12-15

## 2018-12-15 LAB
ABO + RH BLD: NORMAL
ALBUMIN SERPL BCP-MCNC: 4.1 G/DL
ALLENS TEST: ABNORMAL
ALLENS TEST: ABNORMAL
ALP SERPL-CCNC: 102 U/L
ALT SERPL W/O P-5'-P-CCNC: 16 U/L
AMPHET+METHAMPHET UR QL: NORMAL
ANION GAP SERPL CALC-SCNC: 11 MMOL/L
APAP SERPL-MCNC: <3 UG/ML
APTT BLDCRRT: 30.2 SEC
AST SERPL-CCNC: 28 U/L
BARBITURATES UR QL SCN>200 NG/ML: NEGATIVE
BASOPHILS # BLD AUTO: 0.05 K/UL
BASOPHILS NFR BLD: 0.5 %
BENZODIAZ UR QL SCN>200 NG/ML: NORMAL
BILIRUB SERPL-MCNC: 0.4 MG/DL
BILIRUB UR QL STRIP: NEGATIVE
BLD GP AB SCN CELLS X3 SERPL QL: NORMAL
BNP SERPL-MCNC: 293 PG/ML
BUN SERPL-MCNC: 9 MG/DL
BZE UR QL SCN: NEGATIVE
CALCIUM SERPL-MCNC: 9.4 MG/DL
CANNABINOIDS UR QL SCN: NEGATIVE
CHLORIDE SERPL-SCNC: 103 MMOL/L
CLARITY UR: CLEAR
CO2 SERPL-SCNC: 23 MMOL/L
COLOR UR: YELLOW
CREAT SERPL-MCNC: 0.8 MG/DL
CREAT UR-MCNC: 60.4 MG/DL
DELSYS: ABNORMAL
DELSYS: ABNORMAL
DIFFERENTIAL METHOD: ABNORMAL
EOSINOPHIL # BLD AUTO: 0.1 K/UL
EOSINOPHIL NFR BLD: 0.9 %
ERYTHROCYTE [DISTWIDTH] IN BLOOD BY AUTOMATED COUNT: 14.3 %
EST. GFR  (AFRICAN AMERICAN): >60 ML/MIN/1.73 M^2
EST. GFR  (NON AFRICAN AMERICAN): >60 ML/MIN/1.73 M^2
ETHANOL SERPL-MCNC: <10 MG/DL
FIO2: 21
FIO2: 21
GLUCOSE SERPL-MCNC: 93 MG/DL
GLUCOSE UR QL STRIP: NEGATIVE
HCO3 UR-SCNC: 19.3 MMOL/L (ref 24–28)
HCO3 UR-SCNC: 23 MMOL/L (ref 24–28)
HCT VFR BLD AUTO: 44 %
HGB BLD-MCNC: 14.2 G/DL
HGB UR QL STRIP: ABNORMAL
INR PPP: 0.9
KETONES UR QL STRIP: ABNORMAL
LEUKOCYTE ESTERASE UR QL STRIP: NEGATIVE
LYMPHOCYTES # BLD AUTO: 1.7 K/UL
LYMPHOCYTES NFR BLD: 16.4 %
MAGNESIUM SERPL-MCNC: 2.2 MG/DL
MCH RBC QN AUTO: 28.2 PG
MCHC RBC AUTO-ENTMCNC: 32.3 G/DL
MCV RBC AUTO: 88 FL
METHADONE UR QL SCN>300 NG/ML: NEGATIVE
MODE: ABNORMAL
MODE: ABNORMAL
MONOCYTES # BLD AUTO: 0.7 K/UL
MONOCYTES NFR BLD: 6.3 %
NEUTROPHILS # BLD AUTO: 8 K/UL
NEUTROPHILS NFR BLD: 75.9 %
NITRITE UR QL STRIP: NEGATIVE
OPIATES UR QL SCN: NEGATIVE
PCO2 BLDA: 33 MMHG (ref 35–45)
PCO2 BLDA: 40.5 MMHG (ref 35–45)
PCP UR QL SCN>25 NG/ML: NEGATIVE
PH SMN: 7.36 [PH] (ref 7.35–7.45)
PH SMN: 7.38 [PH] (ref 7.35–7.45)
PH UR STRIP: 7 [PH] (ref 5–8)
PLATELET # BLD AUTO: 365 K/UL
PMV BLD AUTO: 11.3 FL
PO2 BLDA: 25 MMHG (ref 40–60)
PO2 BLDA: 75 MMHG (ref 80–100)
POC BE: -2 MMOL/L
POC BE: -6 MMOL/L
POC SATURATED O2: 42 % (ref 95–100)
POC SATURATED O2: 95 % (ref 95–100)
POTASSIUM SERPL-SCNC: 3.1 MMOL/L
PROT SERPL-MCNC: 7.9 G/DL
PROT UR QL STRIP: NEGATIVE
PROTHROMBIN TIME: 10.2 SEC
RBC # BLD AUTO: 5.03 M/UL
SALICYLATES SERPL-MCNC: 12.7 MG/DL
SAMPLE: ABNORMAL
SAMPLE: ABNORMAL
SITE: ABNORMAL
SODIUM SERPL-SCNC: 137 MMOL/L
SP GR UR STRIP: 1.01 (ref 1–1.03)
TOXICOLOGY INFORMATION: NORMAL
TROPONIN I SERPL DL<=0.01 NG/ML-MCNC: 0.01 NG/ML
TSH SERPL DL<=0.005 MIU/L-ACNC: 1.42 UIU/ML
URN SPEC COLLECT METH UR: ABNORMAL
UROBILINOGEN UR STRIP-ACNC: NEGATIVE EU/DL
WBC # BLD AUTO: 10.54 K/UL

## 2018-12-15 PROCEDURE — 83880 ASSAY OF NATRIURETIC PEPTIDE: CPT

## 2018-12-15 PROCEDURE — 63600175 PHARM REV CODE 636 W HCPCS: Performed by: NURSE PRACTITIONER

## 2018-12-15 PROCEDURE — 99291 CRITICAL CARE FIRST HOUR: CPT | Mod: 25

## 2018-12-15 PROCEDURE — 36415 COLL VENOUS BLD VENIPUNCTURE: CPT

## 2018-12-15 PROCEDURE — 80307 DRUG TEST PRSMV CHEM ANLYZR: CPT

## 2018-12-15 PROCEDURE — 83540 ASSAY OF IRON: CPT | Mod: 91

## 2018-12-15 PROCEDURE — 82803 BLOOD GASES ANY COMBINATION: CPT

## 2018-12-15 PROCEDURE — 63600175 PHARM REV CODE 636 W HCPCS: Performed by: FAMILY MEDICINE

## 2018-12-15 PROCEDURE — 82746 ASSAY OF FOLIC ACID SERUM: CPT

## 2018-12-15 PROCEDURE — 96374 THER/PROPH/DIAG INJ IV PUSH: CPT

## 2018-12-15 PROCEDURE — 85610 PROTHROMBIN TIME: CPT

## 2018-12-15 PROCEDURE — 96376 TX/PRO/DX INJ SAME DRUG ADON: CPT

## 2018-12-15 PROCEDURE — 81003 URINALYSIS AUTO W/O SCOPE: CPT | Mod: 59

## 2018-12-15 PROCEDURE — 84443 ASSAY THYROID STIM HORMONE: CPT

## 2018-12-15 PROCEDURE — 36600 WITHDRAWAL OF ARTERIAL BLOOD: CPT

## 2018-12-15 PROCEDURE — 83735 ASSAY OF MAGNESIUM: CPT

## 2018-12-15 PROCEDURE — 80329 ANALGESICS NON-OPIOID 1 OR 2: CPT

## 2018-12-15 PROCEDURE — 82607 VITAMIN B-12: CPT

## 2018-12-15 PROCEDURE — 25500020 PHARM REV CODE 255: Performed by: INTERNAL MEDICINE

## 2018-12-15 PROCEDURE — 21400001 HC TELEMETRY ROOM

## 2018-12-15 PROCEDURE — 85025 COMPLETE CBC W/AUTO DIFF WBC: CPT

## 2018-12-15 PROCEDURE — 94640 AIRWAY INHALATION TREATMENT: CPT

## 2018-12-15 PROCEDURE — 80320 DRUG SCREEN QUANTALCOHOLS: CPT

## 2018-12-15 PROCEDURE — 25000003 PHARM REV CODE 250: Performed by: FAMILY MEDICINE

## 2018-12-15 PROCEDURE — 80053 COMPREHEN METABOLIC PANEL: CPT

## 2018-12-15 PROCEDURE — 85730 THROMBOPLASTIN TIME PARTIAL: CPT

## 2018-12-15 PROCEDURE — 25500020 PHARM REV CODE 255: Performed by: FAMILY MEDICINE

## 2018-12-15 PROCEDURE — A9585 GADOBUTROL INJECTION: HCPCS | Performed by: INTERNAL MEDICINE

## 2018-12-15 PROCEDURE — G0378 HOSPITAL OBSERVATION PER HR: HCPCS

## 2018-12-15 PROCEDURE — 25000003 PHARM REV CODE 250: Performed by: INTERNAL MEDICINE

## 2018-12-15 PROCEDURE — 99900035 HC TECH TIME PER 15 MIN (STAT)

## 2018-12-15 PROCEDURE — 84484 ASSAY OF TROPONIN QUANT: CPT

## 2018-12-15 PROCEDURE — 86850 RBC ANTIBODY SCREEN: CPT

## 2018-12-15 PROCEDURE — 82728 ASSAY OF FERRITIN: CPT

## 2018-12-15 PROCEDURE — 93010 ELECTROCARDIOGRAM REPORT: CPT | Mod: ,,, | Performed by: INTERNAL MEDICINE

## 2018-12-15 PROCEDURE — 83540 ASSAY OF IRON: CPT

## 2018-12-15 PROCEDURE — 25000242 PHARM REV CODE 250 ALT 637 W/ HCPCS: Performed by: FAMILY MEDICINE

## 2018-12-15 RX ORDER — IPRATROPIUM BROMIDE AND ALBUTEROL SULFATE 2.5; .5 MG/3ML; MG/3ML
3 SOLUTION RESPIRATORY (INHALATION)
Status: COMPLETED | OUTPATIENT
Start: 2018-12-15 | End: 2018-12-15

## 2018-12-15 RX ORDER — ASPIRIN 325 MG
325 TABLET, DELAYED RELEASE (ENTERIC COATED) ORAL DAILY
Status: DISCONTINUED | OUTPATIENT
Start: 2018-12-16 | End: 2018-12-15

## 2018-12-15 RX ORDER — SODIUM CHLORIDE 9 MG/ML
INJECTION, SOLUTION INTRAVENOUS CONTINUOUS
Status: ACTIVE | OUTPATIENT
Start: 2018-12-15 | End: 2018-12-16

## 2018-12-15 RX ORDER — MIDAZOLAM HYDROCHLORIDE 5 MG/ML
2 INJECTION INTRAMUSCULAR; INTRAVENOUS
Status: DISCONTINUED | OUTPATIENT
Start: 2018-12-15 | End: 2018-12-15 | Stop reason: RX

## 2018-12-15 RX ORDER — ATORVASTATIN CALCIUM 40 MG/1
40 TABLET, FILM COATED ORAL DAILY
Status: DISCONTINUED | OUTPATIENT
Start: 2018-12-16 | End: 2018-12-18 | Stop reason: HOSPADM

## 2018-12-15 RX ORDER — MIDAZOLAM HYDROCHLORIDE 1 MG/ML
2.5 INJECTION, SOLUTION INTRAMUSCULAR; INTRAVENOUS ONCE
Status: COMPLETED | OUTPATIENT
Start: 2018-12-15 | End: 2018-12-15

## 2018-12-15 RX ORDER — PANTOPRAZOLE SODIUM 40 MG/1
40 TABLET, DELAYED RELEASE ORAL DAILY
Status: DISCONTINUED | OUTPATIENT
Start: 2018-12-16 | End: 2018-12-18 | Stop reason: HOSPADM

## 2018-12-15 RX ORDER — GADOBUTROL 604.72 MG/ML
10 INJECTION INTRAVENOUS
Status: COMPLETED | OUTPATIENT
Start: 2018-12-15 | End: 2018-12-15

## 2018-12-15 RX ORDER — MIDAZOLAM HYDROCHLORIDE 1 MG/ML
4 INJECTION INTRAMUSCULAR; INTRAVENOUS ONCE
Status: COMPLETED | OUTPATIENT
Start: 2018-12-15 | End: 2018-12-15

## 2018-12-15 RX ORDER — LEVOFLOXACIN 5 MG/ML
750 INJECTION, SOLUTION INTRAVENOUS
Status: DISCONTINUED | OUTPATIENT
Start: 2018-12-15 | End: 2018-12-15

## 2018-12-15 RX ORDER — MIDAZOLAM HYDROCHLORIDE 5 MG/ML
4 INJECTION INTRAMUSCULAR; INTRAVENOUS
Status: DISCONTINUED | OUTPATIENT
Start: 2018-12-15 | End: 2018-12-15

## 2018-12-15 RX ORDER — MIDAZOLAM HYDROCHLORIDE 5 MG/ML
4 INJECTION INTRAMUSCULAR; INTRAVENOUS
Status: DISCONTINUED | OUTPATIENT
Start: 2018-12-15 | End: 2018-12-15 | Stop reason: RX

## 2018-12-15 RX ORDER — POTASSIUM CHLORIDE 20 MEQ/15ML
40 SOLUTION ORAL ONCE
Status: COMPLETED | OUTPATIENT
Start: 2018-12-15 | End: 2018-12-15

## 2018-12-15 RX ORDER — DULOXETIN HYDROCHLORIDE 30 MG/1
60 CAPSULE, DELAYED RELEASE ORAL DAILY
Status: DISCONTINUED | OUTPATIENT
Start: 2018-12-16 | End: 2018-12-18 | Stop reason: HOSPADM

## 2018-12-15 RX ORDER — ASPIRIN 325 MG
325 TABLET, DELAYED RELEASE (ENTERIC COATED) ORAL DAILY
Status: DISCONTINUED | OUTPATIENT
Start: 2018-12-15 | End: 2018-12-18 | Stop reason: HOSPADM

## 2018-12-15 RX ORDER — HALOPERIDOL 5 MG/ML
5 INJECTION INTRAMUSCULAR ONCE
Status: COMPLETED | OUTPATIENT
Start: 2018-12-15 | End: 2018-12-15

## 2018-12-15 RX ORDER — MIDAZOLAM HYDROCHLORIDE 5 MG/ML
2.5 INJECTION INTRAMUSCULAR; INTRAVENOUS
Status: DISCONTINUED | OUTPATIENT
Start: 2018-12-15 | End: 2018-12-15 | Stop reason: RX

## 2018-12-15 RX ADMIN — IPRATROPIUM BROMIDE AND ALBUTEROL SULFATE 3 ML: .5; 3 SOLUTION RESPIRATORY (INHALATION) at 07:12

## 2018-12-15 RX ADMIN — SODIUM CHLORIDE 1000 ML: 0.9 INJECTION, SOLUTION INTRAVENOUS at 05:12

## 2018-12-15 RX ADMIN — SODIUM CHLORIDE: 0.9 INJECTION, SOLUTION INTRAVENOUS at 09:12

## 2018-12-15 RX ADMIN — IOHEXOL 100 ML: 350 INJECTION, SOLUTION INTRAVENOUS at 06:12

## 2018-12-15 RX ADMIN — MIDAZOLAM HYDROCHLORIDE 2.5 MG: 1 INJECTION, SOLUTION INTRAMUSCULAR; INTRAVENOUS at 04:12

## 2018-12-15 RX ADMIN — GADOBUTROL 10 ML: 604.72 INJECTION INTRAVENOUS at 09:12

## 2018-12-15 RX ADMIN — MIDAZOLAM HYDROCHLORIDE 4 MG: 1 INJECTION, SOLUTION INTRAMUSCULAR; INTRAVENOUS at 07:12

## 2018-12-15 RX ADMIN — POTASSIUM CHLORIDE 40 MEQ: 20 SOLUTION ORAL at 05:12

## 2018-12-15 RX ADMIN — HALOPERIDOL LACTATE 5 MG: 5 INJECTION, SOLUTION INTRAMUSCULAR at 08:12

## 2018-12-15 RX ADMIN — LEVOFLOXACIN 750 MG: 750 INJECTION, SOLUTION INTRAVENOUS at 09:12

## 2018-12-15 NOTE — ED NOTES
Patient presents via ambulance with altered mental status since this morning per family. Patient cries intermittently. She is unable to answer questions appropriately. She frequently guards abdomen and is hysterical. Her family reports prior history of stroke and speech impairment.

## 2018-12-15 NOTE — ED PROVIDER NOTES
"SCRIBE #1 NOTE: I, Maday Kolb, am scribing for, and in the presence of, Shirley Arellano MD. I have scribed the entire note.       History     Chief Complaint   Patient presents with    Altered Mental Status     Patient has slurred speech this morning and altered LOC. Unable to fully answer questions consistently.     Review of patient's allergies indicates:  No Known Allergies      History of Present Illness     HPI    12/15/2018, 2:07 PM  History obtained from the patient, boyfriend, and daughter  History mainly obtained from pt's boyfriend and daughter  HPI limited secondary patient's AMS and difficulty speaking      History of Present Illness: Valorie Monzon is a 48 y.o. female patient with a PMHx of HTN, COPD, CVA x2 with residual speech impairment who presents to the Emergency Department for evaluation of AMS which family states she woke up with this morning. Family reports that patient has speech problems since after her previous stroke but usually "talks pretty good." They state that patient is having difficulty getting her words out which she does not normally have. Per daughter, this morning she gave patient a pen and paper because she could not understand what she was saying. Instead of writing words on the paper, pt was drawing circles. Pt's daughter is able to understand some of what patient says. She states that patient is also c/o CP and abd pain. Sxs are constant and moderate in severity.     Arrival mode: Ambulance    PCP: Karri Levy MD        Past Medical History:  Past Medical History:   Diagnosis Date    COPD (chronic obstructive pulmonary disease)     Depression     GERD (gastroesophageal reflux disease)     Hypercholesteremia     Hypertension     Kidney stone     Stroke     Tobacco use        Past Surgical History:  Past Surgical History:   Procedure Laterality Date    HYSTERECTOMY      KNEE ARTHROSCOPY W/ ACL RECONSTRUCTION Left     LITHOTRIPSY           Family " History:  Family History   Problem Relation Age of Onset    Kidney disease Mother     Cancer Mother     Liver disease Mother     Breast cancer Mother        Social History:  Social History     Tobacco Use    Smoking status: Former Smoker     Packs/day: 1.00     Types: Cigarettes     Last attempt to quit: 2018     Years since quittin.8    Smokeless tobacco: Never Used    Tobacco comment: patches    Substance and Sexual Activity    Alcohol use: No    Drug use: No    Sexual activity: Not Currently     Partners: Male        Review of Systems     Review of Systems   Unable to perform ROS: Mental status change        Physical Exam     Initial Vitals [12/15/18 1353]   BP Pulse Resp Temp SpO2   123/84 102 18 97.9 °F (36.6 °C) 95 %      MAP       --          Physical Exam  Nursing Notes and Vital Signs Reviewed.  Constitutional: Patient is in no acute distress. Well-nourished.  Head: Atraumatic. Normocephalic.  Eyes: PERRL. EOM intact. Conjunctivae are not pale. No scleral icterus.  ENT: Mucous membranes are moist. Oropharynx is clear and symmetric.    Neck: Supple. Full ROM.  Cardiovascular: Regular rate. Regular rhythm. No murmurs, rubs, or gallops. Distal pulses are 2+ and symmetric.  Pulmonary/Chest: No respiratory distress. Clear to auscultation bilaterally. No wheezing or rales.  Abdominal: Soft and non-distended.  There is no tenderness.  No rebound, guarding, or rigidity. Good bowel sounds.  Musculoskeletal: Moves all extremities. No obvious deformities. No edema. No calf tenderness.  Skin: Warm and dry.  Neurological:  Alert and awake. Pt has Broca's and Wernicke's aphasia with dysarthria. She understands and is able to follow commands. Cranial nerves intact. No facial droop. BUE and BLE strength are 5/5.   Psychiatric: Normal affect. Good eye contact. Appropriate in content.     ED Course   Critical Care  Date/Time: 12/15/2018 5:26 PM  Performed by: Shirley Arellano MD  Authorized by: Shirley  "MD Eileen   Direct patient critical care time: 12 minutes  Additional history critical care time: 10 minutes  Ordering / reviewing critical care time: 10 minutes  Documentation critical care time: 8 minutes  Total critical care time (exclusive of procedural time) : 40 minutes  Critical care time was exclusive of separately billable procedures and treating other patients.  Critical care was necessary to treat or prevent imminent or life-threatening deterioration of the following conditions: AMS.  Critical care was time spent personally by me on the following activities: blood draw for specimens, development of treatment plan with patient or surrogate, discussions with consultants, interpretation of cardiac output measurements, evaluation of patient's response to treatment, examination of patient, obtaining history from patient or surrogate, ordering and performing treatments and interventions, ordering and review of laboratory studies, ordering and review of radiographic studies, pulse oximetry, re-evaluation of patient's condition and review of old charts.        ED Vital Signs:  Vitals:    12/15/18 1353 12/15/18 1400 12/15/18 1402 12/15/18 1643   BP: 123/84 125/66 126/77    Pulse: 102 108 105 108   Resp: 18 20     Temp: 97.9 °F (36.6 °C)      TempSrc: Oral      SpO2: 95% 98% 97% 97%   Height: 5' 6" (1.676 m)       12/15/18 1658 12/15/18 1800 12/15/18 1826   BP:  122/77    Pulse: 106 99    Resp:  18    Temp:  98.1 °F (36.7 °C)    TempSrc:  Oral    SpO2: 97% 100% 98%   Height:          Abnormal Lab Results:  Labs Reviewed   ACETAMINOPHEN LEVEL - Abnormal; Notable for the following components:       Result Value    Acetaminophen (Tylenol), Serum <3.0 (*)     All other components within normal limits   B-TYPE NATRIURETIC PEPTIDE - Abnormal; Notable for the following components:     (*)     All other components within normal limits   CBC W/ AUTO DIFFERENTIAL - Abnormal; Notable for the following components:    " Platelets 365 (*)     Gran # (ANC) 8.0 (*)     Gran% 75.9 (*)     Lymph% 16.4 (*)     All other components within normal limits   COMPREHENSIVE METABOLIC PANEL - Abnormal; Notable for the following components:    Potassium 3.1 (*)     All other components within normal limits   SALICYLATE LEVEL - Abnormal; Notable for the following components:    Salicylate Lvl 12.7 (*)     All other components within normal limits   URINALYSIS, REFLEX TO URINE CULTURE - Abnormal; Notable for the following components:    Ketones, UA Trace (*)     Occult Blood UA Trace (*)     All other components within normal limits    Narrative:     Preferred Collection Type->Urine, Clean Catch   ISTAT PROCEDURE - Abnormal; Notable for the following components:    POC PO2 25 (*)     POC HCO3 23.0 (*)     POC SATURATED O2 42 (*)     All other components within normal limits   APTT   DRUG SCREEN PANEL, URINE EMERGENCY    Narrative:     Preferred Collection Type->Urine, Clean Catch   ALCOHOL,MEDICAL (ETHANOL)   MAGNESIUM   PROTIME-INR   TROPONIN I   TSH   FERRITIN   FOLATE   IRON   IRON AND TIBC   VITAMIN B12   TYPE & SCREEN        All Lab Results:  Results for orders placed or performed during the hospital encounter of 12/15/18   Acetaminophen level   Result Value Ref Range    Acetaminophen (Tylenol), Serum <3.0 (L) 10.0 - 20.0 ug/mL   APTT   Result Value Ref Range    aPTT 30.2 21.0 - 32.0 sec   Brain natriuretic peptide   Result Value Ref Range     (H) 0 - 99 pg/mL   CBC auto differential   Result Value Ref Range    WBC 10.54 3.90 - 12.70 K/uL    RBC 5.03 4.00 - 5.40 M/uL    Hemoglobin 14.2 12.0 - 16.0 g/dL    Hematocrit 44.0 37.0 - 48.5 %    MCV 88 82 - 98 fL    MCH 28.2 27.0 - 31.0 pg    MCHC 32.3 32.0 - 36.0 g/dL    RDW 14.3 11.5 - 14.5 %    Platelets 365 (H) 150 - 350 K/uL    MPV 11.3 9.2 - 12.9 fL    Gran # (ANC) 8.0 (H) 1.8 - 7.7 K/uL    Lymph # 1.7 1.0 - 4.8 K/uL    Mono # 0.7 0.3 - 1.0 K/uL    Eos # 0.1 0.0 - 0.5 K/uL    Baso # 0.05  0.00 - 0.20 K/uL    Gran% 75.9 (H) 38.0 - 73.0 %    Lymph% 16.4 (L) 18.0 - 48.0 %    Mono% 6.3 4.0 - 15.0 %    Eosinophil% 0.9 0.0 - 8.0 %    Basophil% 0.5 0.0 - 1.9 %    Differential Method Automated    Comprehensive metabolic panel   Result Value Ref Range    Sodium 137 136 - 145 mmol/L    Potassium 3.1 (L) 3.5 - 5.1 mmol/L    Chloride 103 95 - 110 mmol/L    CO2 23 23 - 29 mmol/L    Glucose 93 70 - 110 mg/dL    BUN, Bld 9 6 - 20 mg/dL    Creatinine 0.8 0.5 - 1.4 mg/dL    Calcium 9.4 8.7 - 10.5 mg/dL    Total Protein 7.9 6.0 - 8.4 g/dL    Albumin 4.1 3.5 - 5.2 g/dL    Total Bilirubin 0.4 0.1 - 1.0 mg/dL    Alkaline Phosphatase 102 55 - 135 U/L    AST 28 10 - 40 U/L    ALT 16 10 - 44 U/L    Anion Gap 11 8 - 16 mmol/L    eGFR if African American >60 >60 mL/min/1.73 m^2    eGFR if non African American >60 >60 mL/min/1.73 m^2   Drug screen panel, emergency   Result Value Ref Range    Benzodiazepines Presumptive Positive     Methadone metabolites Negative     Cocaine (Metab.) Negative     Opiate Scrn, Ur Negative     Barbiturate Screen, Ur Negative     Amphetamine Screen, Ur Presumptive Positive     THC Negative     Phencyclidine Negative     Creatinine, Random Ur 60.4 15.0 - 325.0 mg/dL    Toxicology Information SEE COMMENT    Ethanol   Result Value Ref Range    Alcohol, Medical, Serum <10 <10 mg/dL   Magnesium   Result Value Ref Range    Magnesium 2.2 1.6 - 2.6 mg/dL   Protime-INR   Result Value Ref Range    Prothrombin Time 10.2 9.0 - 12.5 sec    INR 0.9 0.8 - 1.2   Salicylate level   Result Value Ref Range    Salicylate Lvl 12.7 (L) 15.0 - 30.0 mg/dL   Troponin I   Result Value Ref Range    Troponin I 0.013 0.000 - 0.026 ng/mL   TSH   Result Value Ref Range    TSH 1.416 0.400 - 4.000 uIU/mL   Urinalysis, Reflex to Urine Culture Urine, Clean Catch   Result Value Ref Range    Specimen UA Urine, Clean Catch     Color, UA Yellow Yellow, Straw, Ana    Appearance, UA Clear Clear    pH, UA 7.0 5.0 - 8.0    Specific  Gravity, UA 1.010 1.005 - 1.030    Protein, UA Negative Negative    Glucose, UA Negative Negative    Ketones, UA Trace (A) Negative    Bilirubin (UA) Negative Negative    Occult Blood UA Trace (A) Negative    Nitrite, UA Negative Negative    Urobilinogen, UA Negative <2.0 EU/dL    Leukocytes, UA Negative Negative   Type & Screen   Result Value Ref Range    Group & Rh A POS     Indirect Fadi NEG    ISTAT PROCEDURE   Result Value Ref Range    POC PH 7.362 7.35 - 7.45    POC PCO2 40.5 35 - 45 mmHg    POC PO2 25 (LL) 40 - 60 mmHg    POC HCO3 23.0 (L) 24 - 28 mmol/L    POC BE -2 -2 to 2 mmol/L    POC SATURATED O2 42 (L) 95 - 100 %    Sample VENOUS     Allens Test N/A     DelSys Room Air     Mode SPONT     FiO2 21        Imaging Results:  Imaging Results          CTA Chest Non-Coronary - PE Study (Final result)  Result time 12/15/18 18:31:28    Final result by Freddy Garvey MD (12/15/18 18:31:28)                 Impression:      No pulmonary embolism.    Mild interlobular septal thickening, with diffuse mild ground-glass suggesting vascular congestion, and trace pulmonary edema.  Mild bronchial wall thickening and bronchiectasis.  No pleural effusions.  No consolidation.  Mild tracheomalacia.    Mild cardiomegaly.    All CT scans at this facility use dose modulation, iterative reconstruction and/or weight based dosing when appropriate to reduce radiation dose to as low as reasonably achievable.      Electronically signed by: Freddy Garvey  Date:    12/15/2018  Time:    18:31             Narrative:    EXAMINATION:  CTA CHEST NON CORONARY    CLINICAL HISTORY:  Dyspnea, cardiac origin suspected;    TECHNIQUE:  Low dose axial images, sagittal and coronal reformations were obtained from the thoracic inlet to the lung bases. Contrast was not administered.    COMPARISON:  12/15/2018    FINDINGS:  Thyroid unremarkable.    No supraclavicular, axillary, mediastinal, or hilar lymphadenopathy.    No evidence of pulmonary  embolism.    Mild interlobular septal thickening, with diffuse mild ground-glass suggesting vascular congestion, and trace edema.  Mild bronchial wall thickening and bronchiectasis.  No pleural effusions.  No consolidation.  Mild tracheomalacia.    Mild cardiomegaly.    No acute airway disease.    Images through the upper abdomen demonstrate no acute abnormality.    No suspicious or acute osseous abnormality.  Small sliding hiatal hernia.                               X-Ray Chest AP Portable (Final result)  Result time 12/15/18 15:59:39    Final result by Freddy Garvey MD (12/15/18 15:59:39)                 Impression:      No acute abnormality.      Electronically signed by: Freddy Garvey  Date:    12/15/2018  Time:    15:59             Narrative:    EXAMINATION:  XR CHEST AP PORTABLE    CLINICAL HISTORY:  ams;.    TECHNIQUE:  Single frontal portable view of the chest was performed.    COMPARISON:  09/18/2018    FINDINGS:  Support devices: None    The lungs are clear, with normal appearance of pulmonary vasculature and no pleural effusion or pneumothorax.    The cardiac silhouette is normal in size. The hilar and mediastinal contours are unremarkable.    Bones are intact.                               CT Head Without Contrast (Final result)  Result time 12/15/18 15:11:15    Final result by Alessio Nair Jr., MD (12/15/18 15:11:15)                 Impression:      1. No acute intracranial CT abnormality.  2. Unchanged right middle cerebral artery distribution old infarcts, likely embolic.  All CT scans at this facility use dose modulation, iterative reconstruction, and/or weight base dosing when appropriate to reduce radiation dose to as low as reasonably achievable.      Electronically signed by: Alessio Nair Jr., MD  Date:    12/15/2018  Time:    15:11             Narrative:    EXAMINATION:  CT HEAD WITHOUT CONTRAST    TECHNIQUE:  Contiguous axial images were obtained from the skull base through the vertex  without intravenous contrast.    COMPARISON:  Prior CT of the head from April 4, 2018 was reviewed.    FINDINGS:  No intracranial hemorrhage. No mass effect or midline shift. No extra axial fluid collections. Again demonstrated is a multifocal encephalomalacia within the right middle cerebral artery distribution involving the right frontal lobe and right temporal lobe.  Apparent hyperdensity about the basilar tip is favored to relate to bone shadowing/motion artifact.  The ventricles and sulci are normal in size and configuration. There is no evidence of hydrocephalus. The pineal region is unremarkable. The posterior fossa structures are grossly unremarkable within the limits of CT scan. The paranasal sinuses and mastoid air cells are clear. No fractures are identified. No concerning osseous lesions.                                 The EKG was ordered, reviewed, and independently interpreted by the ED provider.  Interpretation time: 13:58  Rate: 101 BPM  Rhythm: sinus tachycardia  Interpretation: Nonspecific ST abnormality. No STEMI.         The Emergency Provider reviewed the vital signs and test results, which are outlined above.     ED Discussion   3:52 PM: Patient was very anxious upon re-evaluation. Was going to order Ativan but patient is allergic. Will give Versed instead.    6:41 PM: Re-evaluated pt. I have discussed test results, shared treatment plan, and the need for admission with patient and family at bedside. Pt and family express understanding at this time and agree with all information. All questions answered. Pt and family have no further questions or concerns at this time. Pt is ready for admit.    7:29 PM: Discussed case with Dr. Rodrigez ([facility]) who agrees with current care and management of pt and accepts admission. Recommends obtaining an MRI.  Admitting Service: Hospital Medicine  Admitting Physician: Dr. Rodrigez  Admit to: Obs-Tele    ED Medication(s):  Medications   lorazepam  (ATIVAN) injection 2 mg (not administered)   levoFLOXacin 750 mg/150 mL IVPB 750 mg (not administered)   albuterol-ipratropium 2.5 mg-0.5 mg/3 mL nebulizer solution 3 mL (not administered)   midazolam (VERSED) 1 mg/mL injection 4 mg (not administered)   midazolam injection 2.5 mg (2.5 mg Intravenous Given 12/15/18 1600)   potassium chloride 10% oral solution 40 mEq (40 mEq Oral Given 12/15/18 1751)   sodium chloride 0.9% bolus 1,000 mL (1,000 mLs Intravenous New Bag 12/15/18 1753)   omnipaque 350 iohexol 100 mL (100 mLs Intravenous Given 12/15/18 1816)     Medical Decision Making:   Clinical Tests:   Lab Tests: Ordered and Reviewed  Radiological Study: Ordered and Reviewed  Medical Tests: Ordered and Reviewed         Scribe Attestation:   Scribe #1: I performed the above scribed service and the documentation accurately describes the services I performed. I attest to the accuracy of the note.     Attending:   Physician Attestation Statement for Scribe #1: I, Shirley Arellano MD, personally performed the services described in this documentation, as scribed by Maday Kolb, in my presence, and it is both accurate and complete.           Clinical Impression       ICD-10-CM ICD-9-CM   1. Altered mental state R41.82 780.97   2. Slurred speech R47.81 784.59   3. Illicit drug use F19.90 305.90       Disposition:   Disposition: Placed in Observation (obs-tele)  Condition: Fair         Shirley Arellano MD  12/16/18 1510     General

## 2018-12-16 PROBLEM — I63.9 ACUTE CVA (CEREBROVASCULAR ACCIDENT): Status: ACTIVE | Noted: 2018-03-22

## 2018-12-16 PROBLEM — R41.82 ALTERED MENTAL STATE: Status: ACTIVE | Noted: 2018-12-16

## 2018-12-16 LAB
ANION GAP SERPL CALC-SCNC: 7 MMOL/L
BASOPHILS # BLD AUTO: 0.04 K/UL
BASOPHILS NFR BLD: 0.6 %
BUN SERPL-MCNC: 6 MG/DL
CALCIUM SERPL-MCNC: 8.3 MG/DL
CHLORIDE SERPL-SCNC: 111 MMOL/L
CO2 SERPL-SCNC: 22 MMOL/L
CREAT SERPL-MCNC: 0.7 MG/DL
DIFFERENTIAL METHOD: ABNORMAL
EOSINOPHIL # BLD AUTO: 0.1 K/UL
EOSINOPHIL NFR BLD: 2.2 %
ERYTHROCYTE [DISTWIDTH] IN BLOOD BY AUTOMATED COUNT: 14.5 %
EST. GFR  (AFRICAN AMERICAN): >60 ML/MIN/1.73 M^2
EST. GFR  (NON AFRICAN AMERICAN): >60 ML/MIN/1.73 M^2
FERRITIN SERPL-MCNC: 17 NG/ML
FOLATE SERPL-MCNC: 4.4 NG/ML
GLUCOSE SERPL-MCNC: 84 MG/DL
HCT VFR BLD AUTO: 39.3 %
HGB BLD-MCNC: 12.4 G/DL
IRON SERPL-MCNC: 77 UG/DL
IRON SERPL-MCNC: 82 UG/DL
LYMPHOCYTES # BLD AUTO: 0.9 K/UL
LYMPHOCYTES NFR BLD: 14.2 %
MCH RBC QN AUTO: 27.7 PG
MCHC RBC AUTO-ENTMCNC: 31.6 G/DL
MCV RBC AUTO: 88 FL
MONOCYTES # BLD AUTO: 0.5 K/UL
MONOCYTES NFR BLD: 7.4 %
NEUTROPHILS # BLD AUTO: 4.9 K/UL
NEUTROPHILS NFR BLD: 75.6 %
PLATELET # BLD AUTO: 276 K/UL
PMV BLD AUTO: 10.5 FL
POTASSIUM SERPL-SCNC: 3.7 MMOL/L
RBC # BLD AUTO: 4.47 M/UL
SATURATED IRON: 15 %
SODIUM SERPL-SCNC: 140 MMOL/L
TOTAL IRON BINDING CAPACITY: 546 UG/DL
TRANSFERRIN SERPL-MCNC: 369 MG/DL
VIT B12 SERPL-MCNC: 517 PG/ML
WBC # BLD AUTO: 6.49 K/UL

## 2018-12-16 PROCEDURE — 21400001 HC TELEMETRY ROOM

## 2018-12-16 PROCEDURE — 92610 EVALUATE SWALLOWING FUNCTION: CPT

## 2018-12-16 PROCEDURE — S4991 NICOTINE PATCH NONLEGEND: HCPCS | Performed by: NURSE PRACTITIONER

## 2018-12-16 PROCEDURE — 85025 COMPLETE CBC W/AUTO DIFF WBC: CPT

## 2018-12-16 PROCEDURE — 25000003 PHARM REV CODE 250: Performed by: NURSE PRACTITIONER

## 2018-12-16 PROCEDURE — 36415 COLL VENOUS BLD VENIPUNCTURE: CPT

## 2018-12-16 PROCEDURE — 25000003 PHARM REV CODE 250: Performed by: INTERNAL MEDICINE

## 2018-12-16 PROCEDURE — 80048 BASIC METABOLIC PNL TOTAL CA: CPT

## 2018-12-16 PROCEDURE — 63600175 PHARM REV CODE 636 W HCPCS: Performed by: NURSE PRACTITIONER

## 2018-12-16 PROCEDURE — 93307 TTE W/O DOPPLER COMPLETE: CPT

## 2018-12-16 PROCEDURE — 93307 TTE W/O DOPPLER COMPLETE: CPT | Mod: 26,,, | Performed by: INTERNAL MEDICINE

## 2018-12-16 PROCEDURE — 99233 SBSQ HOSP IP/OBS HIGH 50: CPT | Mod: ,,, | Performed by: PSYCHIATRY & NEUROLOGY

## 2018-12-16 PROCEDURE — 99900038 HC OT GENERIC THERAPY SCREENING (STAT)

## 2018-12-16 PROCEDURE — 96374 THER/PROPH/DIAG INJ IV PUSH: CPT

## 2018-12-16 RX ORDER — ENOXAPARIN SODIUM 100 MG/ML
40 INJECTION SUBCUTANEOUS EVERY 24 HOURS
Status: DISCONTINUED | OUTPATIENT
Start: 2018-12-16 | End: 2018-12-18 | Stop reason: HOSPADM

## 2018-12-16 RX ORDER — ACETAMINOPHEN 325 MG/1
650 TABLET ORAL EVERY 6 HOURS PRN
Status: DISCONTINUED | OUTPATIENT
Start: 2018-12-16 | End: 2018-12-18 | Stop reason: HOSPADM

## 2018-12-16 RX ORDER — IBUPROFEN 200 MG
1 TABLET ORAL DAILY
Status: DISCONTINUED | OUTPATIENT
Start: 2018-12-16 | End: 2018-12-18 | Stop reason: HOSPADM

## 2018-12-16 RX ADMIN — PANTOPRAZOLE SODIUM 40 MG: 40 TABLET, DELAYED RELEASE ORAL at 12:12

## 2018-12-16 RX ADMIN — ATORVASTATIN CALCIUM 40 MG: 40 TABLET, FILM COATED ORAL at 12:12

## 2018-12-16 RX ADMIN — DULOXETINE 60 MG: 30 CAPSULE, DELAYED RELEASE ORAL at 12:12

## 2018-12-16 RX ADMIN — ENOXAPARIN SODIUM 40 MG: 100 INJECTION SUBCUTANEOUS at 05:12

## 2018-12-16 RX ADMIN — ACETAMINOPHEN 650 MG: 325 TABLET ORAL at 05:12

## 2018-12-16 RX ADMIN — Medication 1 PATCH: at 06:12

## 2018-12-16 RX ADMIN — ASPIRIN 325 MG: 325 TABLET, DELAYED RELEASE ORAL at 12:12

## 2018-12-16 RX ADMIN — ACETAMINOPHEN 650 MG: 325 TABLET ORAL at 11:12

## 2018-12-16 NOTE — ED NOTES
levoFLOXacin 750 mg/150 mL IVPB 750 mg administration delayed due to lost of Intravenous access.

## 2018-12-16 NOTE — PLAN OF CARE
Problem: SLP Goal  Goal: SLP Goal  LT. Pt will tolerate least restrictive diet consistency safely and efficiently.  2. Pt will communicate needs/ideas effectively.    ST. BSA with full meal with further swallowing goals as appropriate.  2. Follow 2-3 step commands with 90%  3. Answer simple yes/no questions with 90%  4. Complete simple naming tasks with 80%  5. Complete WAB with goals as appropriate         Pt tolerated S.T. Evaluation.  She has been recommended for a Soft Mechanical consistency diet and thin liquids with precautions.   Pt recommended to continue S.T. For language tx.

## 2018-12-16 NOTE — PT/OT/SLP PROGRESS
Occupational Therapy      Patient Name:  Valorie Monzon   MRN:  [id number]    EVAL INITIATED VIA CHART REVIEW. ATTEMPTED EVAL INTERRUPTED NEUROLOGY MD. WILL COMPLETE EVAL AT LATER DATE.  Fidelia Morgan OT  12/16/2018   [id number]

## 2018-12-16 NOTE — ASSESSMENT & PLAN NOTE
Unclear etiology.  Patient extremely hysterical, crying, talking incomprehensibly.  CT head without acute changes.  Reveals old embolic infarcts.  Obtain MRI brain in a.m. to rule out CVA.  Neurology consult.  UDS positive for benzodiazepines and amphetamines.  Neuro checks every 4 hr.

## 2018-12-16 NOTE — PLAN OF CARE
Problem: Adult Inpatient Plan of Care  Goal: Plan of Care Review  Outcome: Ongoing (interventions implemented as appropriate)  Pt remains confused and crying asking for family, able to follow commands. IV fluids administered per orders, neuro checks Q 4. Pt remained free of falls during shift. Bed alarm set , call light in reach, room free of clutter, side rails  up x2, pt on telemetry monitor SR, will continue to monitor.

## 2018-12-16 NOTE — PROGRESS NOTES
Patient arrived to  room via stretcher from ER accompanied by Tamara CRUZ. Transferred to bed 222. Heart monitor in place, vital signs taken.  Assessment as charted. Pt awake and alert Patient has been orientated to room, call light, fall precautions, rounding sheet, and board. No questions or concerns at this time.

## 2018-12-16 NOTE — ASSESSMENT & PLAN NOTE
Unclear etiology, symptoms have now improved  Patient extremely hysterical, crying, talking incomprehensibly.  CT head without acute changes.  Reveals old embolic infarcts.  MRI positive for acute CVA   Neurology consult.  UDS positive for benzodiazepines and amphetamines.  Neuro checks every 4 hr.

## 2018-12-16 NOTE — ASSESSMENT & PLAN NOTE
Admit to inpatient   CT head reveals old embolic type infarcts.  Continue statin, aspirin.  MRI showed restricted diffusion in the MCA likely M3 territory peripheral left parietal region, as well as posterior aspect left insula, consistent with acute embolic infarcts.  Neurology consulted  PT/OT evaluate and treat   Speech evaluation   Check 2 D Echo   Bilateral carotid ultrasound

## 2018-12-16 NOTE — PT/OT/SLP EVAL
Speech Language Pathology Evaluation  Bedside Swallow    Patient Name:  Valorie Monzon   MRN:  [id number]  Admitting Diagnosis: <principal problem not specified>    Recommendations:                 General Recommendations:  Continued Speech Therapy  Diet recommendations:  Mechanical soft, Thin   Aspiration Precautions: In Place  General Precautions: Standard, aspiration  Communication strategies:  Verbal    History:   Pt is a 48 year old female admitted with confusion, hallucinations, disorientation.    Initial Head CT was negative but MRI 12/15/18 showed Acute Embolic Infarcts and old remote right MCA.    Past Medical History:   Diagnosis Date    COPD (chronic obstructive pulmonary disease)     Depression     GERD (gastroesophageal reflux disease)     Hypercholesteremia     Hypertension     Kidney stone     Stroke     Tobacco use        Past Surgical History:   Procedure Laterality Date    HYSTERECTOMY      KNEE ARTHROSCOPY W/ ACL RECONSTRUCTION Left     LITHOTRIPSY         Social History: Patient lives with her boyfriend    Prior Intubation HX:  n/a    Modified Barium Swallow: n/a    Chest X-Rays: Lungs clear    Prior diet: Regular consistency    Occupation/hobbies/homemaking: Pt was independent with daily living skills.    Subjective   Pt sitting up in bed   Patient goals: Pt unable to state goals due to decrease in receptive and expressive language.    Pain/Comfort:  · Pain Rating 1: 0/10    Objective:     Oral Musculature Evaluation  · Dentition: present and adequate    Bedside Swallow Eval:   Consistencies Assessed:  · Thin liquids, thick liquids, pureed, solids.    Oral Phase:   · Pt with no observable oral phase difficulties.    Pharyngeal Phase:   · No coughing, wet vocal quality or swallow delays.    Compensatory Strategies  · Basic swallow precautions reviewed with patient.    Treatment:  Pt was inconsistent with following simple commands.   She also presented with moderately decreased ability  to complete naming tasks at the word and phrase level.  She presented with delays in responding due to decreased auditory processing and decreased word-finding skills.  Pt was also very anxious and began to cry when she was unable to communicate her name and names of her family.      Assessment:     Valorie Monzon is a 48 y.o. female with an SLP diagnosis of decreased Receptive and Expressive Language skills as well as some Apraxia.  Pt did not present with swallowing difficulties and has been recommended for a Soft Mechanical consistency diet with thin liquids with basic swallowing precautions.      Goals:   Multidisciplinary Problems     SLP Goals        Problem: SLP Goal    Goal Priority Disciplines Outcome   SLP Goal     SLP    Description:  LT. Pt will tolerate least restrictive diet consistency safely and efficiently.  2. Pt will communicate needs/ideas effectively.    ST. BSA with full meal with further swallowing goals as appropriate.  2. Follow 2-3 step commands with 90%  3. Answer simple yes/no questions with 90%  4. Complete simple naming tasks with 80%  5. Complete WAB with goals as appropriate                    Plan:     · Patient to be seen:  5 x/week   · Plan of Care expires:     · Plan of Care reviewed with:      · SLP Follow-Up:  Yes       Discharge recommendations:  rehabilitation facility   Barriers to Discharge:      Time Tracking:     SLP Treatment Date:   18  Speech Start Time:  1030  Speech Stop Time:  1100     Speech Total Time (min):  30 min    Billable Minutes: 30 minutes    Mabel Reyna CCC-SLP  2018

## 2018-12-16 NOTE — H&P
[facility] Hospital Medicine  History & Physical    Patient Name: Valorie Monzon  MRN: [id number]  Admission Date: 12/15/2018  Attending Physician: Chinedu Rodrigez MD  Primary Care Provider: Karri Levy MD         Patient information was obtained from relative(s), past medical records and ER records.     Subjective:     Principal Problem:<principal problem not specified>    Chief Complaint:   Chief Complaint   Patient presents with    Altered Mental Status     Patient has slurred speech this morning and altered LOC. Unable to fully answer questions consistently.        HPI: Ms. Monzon is a 48-year-old  female, who appears significantly older than her stated age, history of generalized anxiety disorder, COPD, TIAs in the past, was last known normal last night, and a starting early this morning she of was confused according to the family members.  She was hallucinating, talking to herself, disoriented, progressively getting worse hence brought to the ED.  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  Patient is extremely anxious and history equal.  Talking nonstop, but unable to comprehend anything.  Does not follow commands, but is moving all of bilateral upper and lower extremities spontaneously.  Patient admitted for acute encephalopathy of unclear etiology.  Family described that as outpatient her PCP has been trying to wean her off of benzodiazepines ) exam recs).  UDS positive for benzodiazepines and amphetamines (family denies any known drug use).    Past Medical History:   Diagnosis Date    COPD (chronic obstructive pulmonary disease)     Depression     GERD (gastroesophageal reflux disease)     Hypercholesteremia     Hypertension     Kidney stone     Stroke     Tobacco use        Past Surgical History:   Procedure Laterality Date    HYSTERECTOMY      KNEE  ARTHROSCOPY W/ ACL RECONSTRUCTION Left     LITHOTRIPSY         Review of patient's allergies indicates:   Allergen Reactions    Lorazepam Other (See Comments)     Causes hallucination       No current facility-administered medications on file prior to encounter.      Current Outpatient Medications on File Prior to Encounter   Medication Sig    albuterol 90 mcg/actuation inhaler Inhale 2 puffs into the lungs every 4 (four) hours as needed for Wheezing.    ALPRAZolam (XANAX) 0.25 MG tablet Take 1 tablet (0.25 mg total) by mouth 3 (three) times daily as needed for Anxiety.    aspirin (ECOTRIN) 325 MG EC tablet Take 325 mg by mouth once daily.    atorvastatin (LIPITOR) 40 MG tablet TAKE 1 TABLET BY MOUTH NIGHTLY AVOID GRAPEFRUIT    calcium carbonate (CALCIUM ANTACID) 300 mg (750 mg) Chew Take by mouth 2 (two) times daily as needed.    carvedilol (COREG) 12.5 MG tablet Take 12.5 mg by mouth 2 (two) times daily.     DULoxetine (CYMBALTA) 60 MG capsule Take 60 mg by mouth once daily.    fluticasone-vilanterol (BREO ELLIPTA) 100-25 mcg/dose diskus inhaler 1 puff daily    furosemide (LASIX) 20 MG tablet Take 1 tablet (20 mg total) by mouth once daily. (Patient taking differently: Take 20 mg by mouth 2 (two) times daily. )    gabapentin (NEURONTIN) 600 MG tablet Take 600 mg by mouth 2 (two) times daily.    HYDROcodone-acetaminophen (NORCO) 5-325 mg per tablet Take 1 tablet by mouth every 6 (six) hours as needed for Pain.    neomycin-polymyxin-pramoxine (NEOSPORIN) 3.5-10,000-10 mg-unit-mg/gram Crea Apply topically to right lower extremity wound daily    nicotine 21-14-7 mg/24 hr PTDS     pantoprazole (PROTONIX) 40 MG tablet Take 40 mg by mouth once daily.    potassium chloride SA (K-DUR,KLOR-CON) 10 MEQ tablet Take 1 tablet (10 mEq total) by mouth once daily.    sucralfate (CARAFATE) 100 mg/mL suspension Take 1 g by mouth 4 (four) times daily.     Family History     Problem Relation (Age of Onset)    Breast  cancer Mother    Cancer Mother    Kidney disease Mother    Liver disease Mother        Tobacco Use    Smoking status: Former Smoker     Packs/day: 1.00     Types: Cigarettes     Last attempt to quit: 2018     Years since quittin.8    Smokeless tobacco: Never Used    Tobacco comment: patches    Substance and Sexual Activity    Alcohol use: No    Drug use: No    Sexual activity: Not Currently     Partners: Male     Review of Systems   Unable to perform ROS: Mental status change     Objective:     Vital Signs (Most Recent):  Temp: 97.8 °F (36.6 °C) (12/15/18 2058)  Pulse: (!) 124 (12/15/18 2058)  Resp: 18 (12/15/18 2058)  BP: 137/74 (12/15/18 2058)  SpO2: 99 % (12/15/18 2058) Vital Signs (24h Range):  Temp:  [97.8 °F (36.6 °C)-98.2 °F (36.8 °C)] 97.8 °F (36.6 °C)  Pulse:  [] 124  Resp:  [18-22] 18  SpO2:  [95 %-100 %] 99 %  BP: (122-137)/(66-84) 137/74        Body mass index is 42.95 kg/m².    Physical Exam   Constitutional: No distress.   HENT:   Head: Normocephalic and atraumatic.   Eyes: Conjunctivae and EOM are normal.   Neck: Normal range of motion. No tracheal deviation present. No thyromegaly present.   Cardiovascular: Normal rate, regular rhythm and intact distal pulses.   No murmur heard.  Pulmonary/Chest: Effort normal and breath sounds normal. No respiratory distress.   Abdominal: Soft. She exhibits no distension. There is no tenderness.   Musculoskeletal: She exhibits no edema or tenderness.   Neurological: She is alert. She exhibits normal muscle tone.   Moving bilateral upper and lower extremities spontaneously, does not follow commands.  Talking nonstop, but uncomprehensible, hallucinating, hysterical, crying.   Skin: Skin is warm. She is not diaphoretic.   Psychiatric: Her affect is inappropriate. She is actively hallucinating.         CRANIAL NERVES     CN III, IV, VI   Extraocular motions are normal.        Significant Labs:   ABGs:   Recent Labs   Lab 12/15/18  1945   PH 7.375    PCO2 33.0*   HCO3 19.3*   POCSATURATED 95   BE -6     BMP:   Recent Labs   Lab 12/15/18  1525   GLU 93      K 3.1*      CO2 23   BUN 9   CREATININE 0.8   CALCIUM 9.4   MG 2.2     CBC:   Recent Labs   Lab 12/15/18  1525   WBC 10.54   HGB 14.2   HCT 44.0   *     CMP:   Recent Labs   Lab 12/15/18  1525      K 3.1*      CO2 23   GLU 93   BUN 9   CREATININE 0.8   CALCIUM 9.4   PROT 7.9   ALBUMIN 4.1   BILITOT 0.4   ALKPHOS 102   AST 28   ALT 16   ANIONGAP 11   EGFRNONAA >60     Lactic Acid: No results for input(s): LACTATE in the last 48 hours.  Troponin:   Recent Labs   Lab 12/15/18  1525   TROPONINI 0.013     Urine Studies:   Recent Labs   Lab 12/15/18  1619   COLORU Yellow   APPEARANCEUA Clear   PHUR 7.0   SPECGRAV 1.010   PROTEINUA Negative   GLUCUA Negative   KETONESU Trace*   BILIRUBINUA Negative   OCCULTUA Trace*   NITRITE Negative   UROBILINOGEN Negative   LEUKOCYTESUR Negative     All pertinent labs within the past 24 hours have been reviewed.    Significant Imaging: I have reviewed and interpreted all pertinent imaging results/findings within the past 24 hours.     Imaging Results          CTA Chest Non-Coronary - PE Study (Final result)  Result time 12/15/18 18:31:28    Final result by Freddy Garvey MD (12/15/18 18:31:28)                 Impression:      No pulmonary embolism.    Mild interlobular septal thickening, with diffuse mild ground-glass suggesting vascular congestion, and trace pulmonary edema.  Mild bronchial wall thickening and bronchiectasis.  No pleural effusions.  No consolidation.  Mild tracheomalacia.    Mild cardiomegaly.    All CT scans at this facility use dose modulation, iterative reconstruction and/or weight based dosing when appropriate to reduce radiation dose to as low as reasonably achievable.      Electronically signed by: Freddy Garvey  Date:    12/15/2018  Time:    18:31             Narrative:    EXAMINATION:  CTA CHEST NON CORONARY    CLINICAL  HISTORY:  Dyspnea, cardiac origin suspected;    TECHNIQUE:  Low dose axial images, sagittal and coronal reformations were obtained from the thoracic inlet to the lung bases. Contrast was not administered.    COMPARISON:  12/15/2018    FINDINGS:  Thyroid unremarkable.    No supraclavicular, axillary, mediastinal, or hilar lymphadenopathy.    No evidence of pulmonary embolism.    Mild interlobular septal thickening, with diffuse mild ground-glass suggesting vascular congestion, and trace edema.  Mild bronchial wall thickening and bronchiectasis.  No pleural effusions.  No consolidation.  Mild tracheomalacia.    Mild cardiomegaly.    No acute airway disease.    Images through the upper abdomen demonstrate no acute abnormality.    No suspicious or acute osseous abnormality.  Small sliding hiatal hernia.                               X-Ray Chest AP Portable (Final result)  Result time 12/15/18 15:59:39    Final result by Freddy Garvey MD (12/15/18 15:59:39)                 Impression:      No acute abnormality.      Electronically signed by: Freddy Garvey  Date:    12/15/2018  Time:    15:59             Narrative:    EXAMINATION:  XR CHEST AP PORTABLE    CLINICAL HISTORY:  ams;.    TECHNIQUE:  Single frontal portable view of the chest was performed.    COMPARISON:  09/18/2018    FINDINGS:  Support devices: None    The lungs are clear, with normal appearance of pulmonary vasculature and no pleural effusion or pneumothorax.    The cardiac silhouette is normal in size. The hilar and mediastinal contours are unremarkable.    Bones are intact.                               CT Head Without Contrast (Final result)  Result time 12/15/18 15:11:15    Final result by Alessio Nair Jr., MD (12/15/18 15:11:15)                 Impression:      1. No acute intracranial CT abnormality.  2. Unchanged right middle cerebral artery distribution old infarcts, likely embolic.  All CT scans at this facility use dose modulation, iterative  reconstruction, and/or weight base dosing when appropriate to reduce radiation dose to as low as reasonably achievable.      Electronically signed by: Alessio Nair Jr., MD  Date:    12/15/2018  Time:    15:11             Narrative:    EXAMINATION:  CT HEAD WITHOUT CONTRAST    TECHNIQUE:  Contiguous axial images were obtained from the skull base through the vertex without intravenous contrast.    COMPARISON:  Prior CT of the head from April 4, 2018 was reviewed.    FINDINGS:  No intracranial hemorrhage. No mass effect or midline shift. No extra axial fluid collections. Again demonstrated is a multifocal encephalomalacia within the right middle cerebral artery distribution involving the right frontal lobe and right temporal lobe.  Apparent hyperdensity about the basilar tip is favored to relate to bone shadowing/motion artifact.  The ventricles and sulci are normal in size and configuration. There is no evidence of hydrocephalus. The pineal region is unremarkable. The posterior fossa structures are grossly unremarkable within the limits of CT scan. The paranasal sinuses and mastoid air cells are clear. No fractures are identified. No concerning osseous lesions.                                I have independently reviewed and interpreted the EKG.     I have independently reviewed all pertinent labs within the past 24 hours.    I have independently reviewed, visualized and interpreted all pertinent imaging results within the past 24 hours and discussed the findings with the ED physician, Dr. Arellano.            Assessment/Plan:     Acute encephalopathy    Unclear etiology.  Patient extremely hysterical, crying, talking incomprehensibly.  CT head without acute changes.  Reveals old embolic infarcts.  Obtain MRI brain in a.m. to rule out CVA.  Neurology consult.  UDS positive for benzodiazepines and amphetamines.  Neuro checks every 4 hr.       ARIE (generalized anxiety disorder)    Family stated that as outpatient her PCP  has been trying to wean her off benzodiazepines.         History of CVA (cerebrovascular accident)    CT head reveals old embolic type infarcts.  Continue statin, aspirin.  Repeat labs in a.m..  Follow-up on MRI brain.         VTE Risk Mitigation (From admission, onward)        Ordered     IP VTE HIGH RISK PATIENT  Once      12/15/18 2113     Place sequential compression device  Until discontinued      12/15/18 2113             Chinedu Rodrigez MD  Department of Hospital Medicine   [facility]

## 2018-12-16 NOTE — HOSPITAL COURSE
Ms Monzon is a 48 year old female who presented to [facility] Emergency Room with Altered Mental Status with difficult speaking. Pt and her son reported symptoms started after 3 am yesterday morning. CT of head showed no acute intracranial abnormality, however MRI Restricted diffusion in the MCA likely M3 territory peripheral left parietal region, as well as posterior aspect left insula, consistent with acute embolic infarcts. Neurology consult, reviewed MRI results with Dr Ortega. Dr. Ortega saw the patient and agreed with the ASA and STATIN medications. MARANDA with bubble study noted severe left atrial enlargement, preserved LVSF and indeterminate LV diastolic function. No reports of patent foramen ovale.  Carotid US was negative for stenosis. CTA of Head and Neck finds no evidence of thromboembolism or vascular stenosis in the distribution of the left middle cerebral artery corresponding to the recent infarction.  2. There is a paucity of flow within the right middle cerebral artery distribution corresponding to an area of encephalomalacia involving the right frontal lobe which is an expected finding.  3. The carotid arteries are widely patent bilaterally.  4. No evidence of aneurysm. Labs to rule out hypercoagulable state are pending at time of discharge. Hypercoagulable W/U in 2016 was negative. PT/OT evaluated patient and she did not require their services as she was high functioning. Pt with global aphasia and Speech Therapy was initiated; pt determined to need mechanical soft diet with thin liquids. [facility] Pt was cleared for discharge by Dr. Ortega, Neurology. She was see and examined and determined to be safe and stable for discharge. Pt was advised to control HTN, smoking cessation, weight loss and exercise. She was prescribed ASA and atorvastatin. Pt/son were advised to follow up with Hola Gillis PCP.

## 2018-12-16 NOTE — SUBJECTIVE & OBJECTIVE
Past Medical History:   Diagnosis Date    COPD (chronic obstructive pulmonary disease)     Depression     GERD (gastroesophageal reflux disease)     Hypercholesteremia     Hypertension     Kidney stone     Stroke     Tobacco use        Past Surgical History:   Procedure Laterality Date    HYSTERECTOMY      KNEE ARTHROSCOPY W/ ACL RECONSTRUCTION Left     LITHOTRIPSY         Review of patient's allergies indicates:   Allergen Reactions    Lorazepam Other (See Comments)     Causes hallucination       No current facility-administered medications on file prior to encounter.      Current Outpatient Medications on File Prior to Encounter   Medication Sig    albuterol 90 mcg/actuation inhaler Inhale 2 puffs into the lungs every 4 (four) hours as needed for Wheezing.    ALPRAZolam (XANAX) 0.25 MG tablet Take 1 tablet (0.25 mg total) by mouth 3 (three) times daily as needed for Anxiety.    aspirin (ECOTRIN) 325 MG EC tablet Take 325 mg by mouth once daily.    atorvastatin (LIPITOR) 40 MG tablet TAKE 1 TABLET BY MOUTH NIGHTLY AVOID GRAPEFRUIT    calcium carbonate (CALCIUM ANTACID) 300 mg (750 mg) Chew Take by mouth 2 (two) times daily as needed.    carvedilol (COREG) 12.5 MG tablet Take 12.5 mg by mouth 2 (two) times daily.     DULoxetine (CYMBALTA) 60 MG capsule Take 60 mg by mouth once daily.    fluticasone-vilanterol (BREO ELLIPTA) 100-25 mcg/dose diskus inhaler 1 puff daily    furosemide (LASIX) 20 MG tablet Take 1 tablet (20 mg total) by mouth once daily. (Patient taking differently: Take 20 mg by mouth 2 (two) times daily. )    gabapentin (NEURONTIN) 600 MG tablet Take 600 mg by mouth 2 (two) times daily.    HYDROcodone-acetaminophen (NORCO) 5-325 mg per tablet Take 1 tablet by mouth every 6 (six) hours as needed for Pain.    neomycin-polymyxin-pramoxine (NEOSPORIN) 3.5-10,000-10 mg-unit-mg/gram Crea Apply topically to right lower extremity wound daily    nicotine 21-14-7 mg/24 hr PTDS      pantoprazole (PROTONIX) 40 MG tablet Take 40 mg by mouth once daily.    potassium chloride SA (K-DUR,KLOR-CON) 10 MEQ tablet Take 1 tablet (10 mEq total) by mouth once daily.    sucralfate (CARAFATE) 100 mg/mL suspension Take 1 g by mouth 4 (four) times daily.     Family History     Problem Relation (Age of Onset)    Breast cancer Mother    Cancer Mother    Kidney disease Mother    Liver disease Mother        Tobacco Use    Smoking status: Former Smoker     Packs/day: 1.00     Types: Cigarettes     Last attempt to quit: 2018     Years since quittin.8    Smokeless tobacco: Never Used    Tobacco comment: patches    Substance and Sexual Activity    Alcohol use: No    Drug use: No    Sexual activity: Not Currently     Partners: Male     Review of Systems   Constitutional: Negative for chills and fever.   HENT: Negative for congestion, rhinorrhea and sinus pressure.    Respiratory: Negative for apnea, cough, choking, chest tightness, shortness of breath, wheezing and stridor.    Cardiovascular: Negative for chest pain, palpitations and leg swelling.   Gastrointestinal: Negative for abdominal distention, abdominal pain, diarrhea, nausea and vomiting.   Endocrine: Negative for cold intolerance and heat intolerance.   Genitourinary: Negative for difficulty urinating and hematuria.   Musculoskeletal: Negative for arthralgias and joint swelling.   Skin: Negative for color change, pallor and rash.   Neurological: Positive for speech difficulty. Negative for dizziness, seizures, weakness, numbness and headaches.   Psychiatric/Behavioral: Negative for agitation. The patient is not nervous/anxious.      Objective:     Vital Signs (Most Recent):  Temp: 98.1 °F (36.7 °C) (18 0751)  Pulse: 109 (18 0751)  Resp: 19 (18 0751)  BP: (!) 102/59 (18 0751)  SpO2: 95 % (18 0751) Vital Signs (24h Range):  Temp:  [97.1 °F (36.2 °C)-98.2 °F (36.8 °C)] 98.1 °F (36.7 °C)  Pulse:  [] 109  Resp:   [16-22] 19  SpO2:  [94 %-100 %] 95 %  BP: ()/(55-80) 102/59        Body mass index is 42.95 kg/m².    Physical Exam   Constitutional: She is oriented to person, place, and time. No distress.   HENT:   Head: Normocephalic and atraumatic.   Eyes: Right eye exhibits no discharge. Left eye exhibits no discharge.   Neck: No JVD present.   Cardiovascular: Exam reveals no gallop and no friction rub.   No murmur heard.  Pulmonary/Chest: No stridor. No respiratory distress. She has no wheezes. She has no rales. She exhibits no tenderness.   Abdominal: She exhibits no distension and no mass. There is no tenderness. There is no rebound and no guarding. No hernia.   Musculoskeletal: She exhibits no edema or deformity.   Neurological: She is alert and oriented to person, place, and time.   Expressive aphasia    Skin: Skin is warm and dry. Capillary refill takes less than 2 seconds. She is not diaphoretic.   Nursing note and vitals reviewed.          Significant Labs:   CBC:   Recent Labs   Lab 12/15/18  1525 12/16/18  0735   WBC 10.54 6.49   HGB 14.2 12.4   HCT 44.0 39.3   * 276     CMP:   Recent Labs   Lab 12/15/18  1525 12/16/18  0735    140   K 3.1* 3.7    111*   CO2 23 22*   GLU 93 84   BUN 9 6   CREATININE 0.8 0.7   CALCIUM 9.4 8.3*   PROT 7.9  --    ALBUMIN 4.1  --    BILITOT 0.4  --    ALKPHOS 102  --    AST 28  --    ALT 16  --    ANIONGAP 11 7*   EGFRNONAA >60 >60     Cardiac Markers:   Recent Labs   Lab 12/15/18  1525   *     Troponin:   Recent Labs   Lab 12/15/18  1525   TROPONINI 0.013       Significant Imaging:     Imaging Results          MRI Brain W WO Contrast (Final result)  Result time 12/15/18 21:54:57    Final result by Freddy Garvey MD (12/15/18 21:54:57)                 Impression:      Restricted diffusion in the MCA likely M3 territory peripheral left parietal region, as well as posterior aspect left insula, consistent with acute embolic infarcts.  No hemorrhagic  transformation.  Prior remote infarcts again noted.      Electronically signed by: Ferddy Garvey  Date:    12/15/2018  Time:    21:54             Narrative:    EXAMINATION:  MRI BRAIN W WO CONTRAST    CLINICAL HISTORY:  Focal neuro deficit, new, fixed or worsening, >6 hours; Altered mental status, unspecified    TECHNIQUE:  Multiplanar multisequence MR imaging of the brain was performed before and after the administration of Gadavist intravenous contrast.    COMPARISON:  CT head 12/15/2018, 04/04/2018    FINDINGS:  Restricted diffusion 6.4 x 2.7 cm in the peripheral left parietal region, MCA territory, as well as posterior aspect left insula, consistent with acute embolic infarct.  There is mild FLAIR and T2 hyperintensity in these regions consistent with edema.  Encephalomalacia again noted at the right frontal and right posterior temporal lobe, compatible with remote embolic infarcts.  No abnormal enhancement.  Normal intravascular flow voids.  No evidence of acute hemorrhage.  Mild mucosal thickening left nasal cavity.                               CTA Chest Non-Coronary - PE Study (Final result)  Result time 12/15/18 18:31:28    Final result by Freddy Garvey MD (12/15/18 18:31:28)                 Impression:      No pulmonary embolism.    Mild interlobular septal thickening, with diffuse mild ground-glass suggesting vascular congestion, and trace pulmonary edema.  Mild bronchial wall thickening and bronchiectasis.  No pleural effusions.  No consolidation.  Mild tracheomalacia.    Mild cardiomegaly.    All CT scans at this facility use dose modulation, iterative reconstruction and/or weight based dosing when appropriate to reduce radiation dose to as low as reasonably achievable.      Electronically signed by: Freddy Garvey  Date:    12/15/2018  Time:    18:31             Narrative:    EXAMINATION:  CTA CHEST NON CORONARY    CLINICAL HISTORY:  Dyspnea, cardiac origin suspected;    TECHNIQUE:  Low dose axial images,  sagittal and coronal reformations were obtained from the thoracic inlet to the lung bases. Contrast was not administered.    COMPARISON:  12/15/2018    FINDINGS:  Thyroid unremarkable.    No supraclavicular, axillary, mediastinal, or hilar lymphadenopathy.    No evidence of pulmonary embolism.    Mild interlobular septal thickening, with diffuse mild ground-glass suggesting vascular congestion, and trace edema.  Mild bronchial wall thickening and bronchiectasis.  No pleural effusions.  No consolidation.  Mild tracheomalacia.    Mild cardiomegaly.    No acute airway disease.    Images through the upper abdomen demonstrate no acute abnormality.    No suspicious or acute osseous abnormality.  Small sliding hiatal hernia.                               X-Ray Chest AP Portable (Final result)  Result time 12/15/18 15:59:39    Final result by Freddy Garvey MD (12/15/18 15:59:39)                 Impression:      No acute abnormality.      Electronically signed by: Frdedy Garvey  Date:    12/15/2018  Time:    15:59             Narrative:    EXAMINATION:  XR CHEST AP PORTABLE    CLINICAL HISTORY:  ams;.    TECHNIQUE:  Single frontal portable view of the chest was performed.    COMPARISON:  09/18/2018    FINDINGS:  Support devices: None    The lungs are clear, with normal appearance of pulmonary vasculature and no pleural effusion or pneumothorax.    The cardiac silhouette is normal in size. The hilar and mediastinal contours are unremarkable.    Bones are intact.                               CT Head Without Contrast (Final result)  Result time 12/15/18 15:11:15    Final result by Alessio Nair Jr., MD (12/15/18 15:11:15)                 Impression:      1. No acute intracranial CT abnormality.  2. Unchanged right middle cerebral artery distribution old infarcts, likely embolic.  All CT scans at this facility use dose modulation, iterative reconstruction, and/or weight base dosing when appropriate to reduce radiation dose to  as low as reasonably achievable.      Electronically signed by: Alessio Nair Jr., MD  Date:    12/15/2018  Time:    15:11             Narrative:    EXAMINATION:  CT HEAD WITHOUT CONTRAST    TECHNIQUE:  Contiguous axial images were obtained from the skull base through the vertex without intravenous contrast.    COMPARISON:  Prior CT of the head from April 4, 2018 was reviewed.    FINDINGS:  No intracranial hemorrhage. No mass effect or midline shift. No extra axial fluid collections. Again demonstrated is a multifocal encephalomalacia within the right middle cerebral artery distribution involving the right frontal lobe and right temporal lobe.  Apparent hyperdensity about the basilar tip is favored to relate to bone shadowing/motion artifact.  The ventricles and sulci are normal in size and configuration. There is no evidence of hydrocephalus. The pineal region is unremarkable. The posterior fossa structures are grossly unremarkable within the limits of CT scan. The paranasal sinuses and mastoid air cells are clear. No fractures are identified. No concerning osseous lesions.

## 2018-12-16 NOTE — PROGRESS NOTES
[facility] Hospital Medicine  Progress Note    Patient Name: Valorie Monzon  MRN: [id number]  Patient Class: IP- Inpatient   Admission Date: 12/15/2018  Length of Stay: 0 days  Attending Physician: Norbert Henry, *  Primary Care Provider: Karri Levy MD        Subjective:     Principal Problem:Acute CVA (cerebrovascular accident)    HPI:  Ms. Monzon is a 48-year-old  female, who appears significantly older than her stated age, history of generalized anxiety disorder, COPD, TIAs in the past, was last known normal last night, and a starting early this morning she of was confused according to the family members.  She was hallucinating, talking to herself, disoriented, progressively getting worse hence brought to the ED.  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  Patient is extremely anxious and history equal.  Talking nonstop, but unable to comprehend anything.  Does not follow commands, but is moving all of bilateral upper and lower extremities spontaneously.  Patient admitted for acute encephalopathy of unclear etiology.  Family described that as outpatient her PCP has been trying to wean her off of benzodiazepines ) exam recs).  UDS positive for benzodiazepines and amphetamines (family denies any known drug use).    Hospital Course:  Ms Monzon is a 48 year old female who presented to [facility] with Altered Mental Status with difficult speaking. Pt and her son reports symptoms started yesterday morning after 3 am yesterday morning. CT of head showed no acute intracranial abnormality, however MRI Restricted diffusion in the MCA likely M3 territory peripheral left parietal region, as well as posterior aspect left insula, consistent with acute embolic infarcts. Neurology consult, reviewed MRI results with Dr Ortega.     Past Medical History:    Diagnosis Date    COPD (chronic obstructive pulmonary disease)     Depression     GERD (gastroesophageal reflux disease)     Hypercholesteremia     Hypertension     Kidney stone     Stroke     Tobacco use        Past Surgical History:   Procedure Laterality Date    HYSTERECTOMY      KNEE ARTHROSCOPY W/ ACL RECONSTRUCTION Left     LITHOTRIPSY         Review of patient's allergies indicates:   Allergen Reactions    Lorazepam Other (See Comments)     Causes hallucination       No current facility-administered medications on file prior to encounter.      Current Outpatient Medications on File Prior to Encounter   Medication Sig    albuterol 90 mcg/actuation inhaler Inhale 2 puffs into the lungs every 4 (four) hours as needed for Wheezing.    ALPRAZolam (XANAX) 0.25 MG tablet Take 1 tablet (0.25 mg total) by mouth 3 (three) times daily as needed for Anxiety.    aspirin (ECOTRIN) 325 MG EC tablet Take 325 mg by mouth once daily.    atorvastatin (LIPITOR) 40 MG tablet TAKE 1 TABLET BY MOUTH NIGHTLY AVOID GRAPEFRUIT    calcium carbonate (CALCIUM ANTACID) 300 mg (750 mg) Chew Take by mouth 2 (two) times daily as needed.    carvedilol (COREG) 12.5 MG tablet Take 12.5 mg by mouth 2 (two) times daily.     DULoxetine (CYMBALTA) 60 MG capsule Take 60 mg by mouth once daily.    fluticasone-vilanterol (BREO ELLIPTA) 100-25 mcg/dose diskus inhaler 1 puff daily    furosemide (LASIX) 20 MG tablet Take 1 tablet (20 mg total) by mouth once daily. (Patient taking differently: Take 20 mg by mouth 2 (two) times daily. )    gabapentin (NEURONTIN) 600 MG tablet Take 600 mg by mouth 2 (two) times daily.    HYDROcodone-acetaminophen (NORCO) 5-325 mg per tablet Take 1 tablet by mouth every 6 (six) hours as needed for Pain.    neomycin-polymyxin-pramoxine (NEOSPORIN) 3.5-10,000-10 mg-unit-mg/gram Crea Apply topically to right lower extremity wound daily    nicotine 21-14-7 mg/24 hr PTDS     pantoprazole (PROTONIX) 40  MG tablet Take 40 mg by mouth once daily.    potassium chloride SA (K-DUR,KLOR-CON) 10 MEQ tablet Take 1 tablet (10 mEq total) by mouth once daily.    sucralfate (CARAFATE) 100 mg/mL suspension Take 1 g by mouth 4 (four) times daily.     Family History     Problem Relation (Age of Onset)    Breast cancer Mother    Cancer Mother    Kidney disease Mother    Liver disease Mother        Tobacco Use    Smoking status: Former Smoker     Packs/day: 1.00     Types: Cigarettes     Last attempt to quit: 2018     Years since quittin.8    Smokeless tobacco: Never Used    Tobacco comment: patches    Substance and Sexual Activity    Alcohol use: No    Drug use: No    Sexual activity: Not Currently     Partners: Male     Review of Systems   Constitutional: Negative for chills and fever.   HENT: Negative for congestion, rhinorrhea and sinus pressure.    Respiratory: Negative for apnea, cough, choking, chest tightness, shortness of breath, wheezing and stridor.    Cardiovascular: Negative for chest pain, palpitations and leg swelling.   Gastrointestinal: Negative for abdominal distention, abdominal pain, diarrhea, nausea and vomiting.   Endocrine: Negative for cold intolerance and heat intolerance.   Genitourinary: Negative for difficulty urinating and hematuria.   Musculoskeletal: Negative for arthralgias and joint swelling.   Skin: Negative for color change, pallor and rash.   Neurological: Positive for speech difficulty. Negative for dizziness, seizures, weakness, numbness and headaches.   Psychiatric/Behavioral: Negative for agitation. The patient is not nervous/anxious.      Objective:     Vital Signs (Most Recent):  Temp: 98.1 °F (36.7 °C) (18 0751)  Pulse: 109 (18 0751)  Resp: 19 (18 0751)  BP: (!) 102/59 (18 0751)  SpO2: 95 % (18 0751) Vital Signs (24h Range):  Temp:  [97.1 °F (36.2 °C)-98.2 °F (36.8 °C)] 98.1 °F (36.7 °C)  Pulse:  [] 109  Resp:  [16-22] 19  SpO2:  [94  %-100 %] 95 %  BP: ()/(55-80) 102/59        Body mass index is 42.95 kg/m².    Physical Exam   Constitutional: She is oriented to person, place, and time. No distress.   HENT:   Head: Normocephalic and atraumatic.   Eyes: Right eye exhibits no discharge. Left eye exhibits no discharge.   Neck: No JVD present.   Cardiovascular: Exam reveals no gallop and no friction rub.   No murmur heard.  Pulmonary/Chest: No stridor. No respiratory distress. She has no wheezes. She has no rales. She exhibits no tenderness.   Abdominal: She exhibits no distension and no mass. There is no tenderness. There is no rebound and no guarding. No hernia.   Musculoskeletal: She exhibits no edema or deformity.   Neurological: She is alert and oriented to person, place, and time.   Expressive aphasia    Skin: Skin is warm and dry. Capillary refill takes less than 2 seconds. She is not diaphoretic.   Nursing note and vitals reviewed.          Significant Labs:   CBC:   Recent Labs   Lab 12/15/18  1525 12/16/18  0735   WBC 10.54 6.49   HGB 14.2 12.4   HCT 44.0 39.3   * 276     CMP:   Recent Labs   Lab 12/15/18  1525 12/16/18  0735    140   K 3.1* 3.7    111*   CO2 23 22*   GLU 93 84   BUN 9 6   CREATININE 0.8 0.7   CALCIUM 9.4 8.3*   PROT 7.9  --    ALBUMIN 4.1  --    BILITOT 0.4  --    ALKPHOS 102  --    AST 28  --    ALT 16  --    ANIONGAP 11 7*   EGFRNONAA >60 >60     Cardiac Markers:   Recent Labs   Lab 12/15/18  1525   *     Troponin:   Recent Labs   Lab 12/15/18  1525   TROPONINI 0.013       Significant Imaging:     Imaging Results          MRI Brain W WO Contrast (Final result)  Result time 12/15/18 21:54:57    Final result by Freddy Garvey MD (12/15/18 21:54:57)                 Impression:      Restricted diffusion in the MCA likely M3 territory peripheral left parietal region, as well as posterior aspect left insula, consistent with acute embolic infarcts.  No hemorrhagic transformation.  Prior remote  infarcts again noted.      Electronically signed by: Freddy Garvey  Date:    12/15/2018  Time:    21:54             Narrative:    EXAMINATION:  MRI BRAIN W WO CONTRAST    CLINICAL HISTORY:  Focal neuro deficit, new, fixed or worsening, >6 hours; Altered mental status, unspecified    TECHNIQUE:  Multiplanar multisequence MR imaging of the brain was performed before and after the administration of Gadavist intravenous contrast.    COMPARISON:  CT head 12/15/2018, 04/04/2018    FINDINGS:  Restricted diffusion 6.4 x 2.7 cm in the peripheral left parietal region, MCA territory, as well as posterior aspect left insula, consistent with acute embolic infarct.  There is mild FLAIR and T2 hyperintensity in these regions consistent with edema.  Encephalomalacia again noted at the right frontal and right posterior temporal lobe, compatible with remote embolic infarcts.  No abnormal enhancement.  Normal intravascular flow voids.  No evidence of acute hemorrhage.  Mild mucosal thickening left nasal cavity.                               CTA Chest Non-Coronary - PE Study (Final result)  Result time 12/15/18 18:31:28    Final result by Freddy Garvey MD (12/15/18 18:31:28)                 Impression:      No pulmonary embolism.    Mild interlobular septal thickening, with diffuse mild ground-glass suggesting vascular congestion, and trace pulmonary edema.  Mild bronchial wall thickening and bronchiectasis.  No pleural effusions.  No consolidation.  Mild tracheomalacia.    Mild cardiomegaly.    All CT scans at this facility use dose modulation, iterative reconstruction and/or weight based dosing when appropriate to reduce radiation dose to as low as reasonably achievable.      Electronically signed by: Freddy Garvey  Date:    12/15/2018  Time:    18:31             Narrative:    EXAMINATION:  CTA CHEST NON CORONARY    CLINICAL HISTORY:  Dyspnea, cardiac origin suspected;    TECHNIQUE:  Low dose axial images, sagittal and coronal  reformations were obtained from the thoracic inlet to the lung bases. Contrast was not administered.    COMPARISON:  12/15/2018    FINDINGS:  Thyroid unremarkable.    No supraclavicular, axillary, mediastinal, or hilar lymphadenopathy.    No evidence of pulmonary embolism.    Mild interlobular septal thickening, with diffuse mild ground-glass suggesting vascular congestion, and trace edema.  Mild bronchial wall thickening and bronchiectasis.  No pleural effusions.  No consolidation.  Mild tracheomalacia.    Mild cardiomegaly.    No acute airway disease.    Images through the upper abdomen demonstrate no acute abnormality.    No suspicious or acute osseous abnormality.  Small sliding hiatal hernia.                               X-Ray Chest AP Portable (Final result)  Result time 12/15/18 15:59:39    Final result by Freddy Garvey MD (12/15/18 15:59:39)                 Impression:      No acute abnormality.      Electronically signed by: Freddy Garvey  Date:    12/15/2018  Time:    15:59             Narrative:    EXAMINATION:  XR CHEST AP PORTABLE    CLINICAL HISTORY:  ams;.    TECHNIQUE:  Single frontal portable view of the chest was performed.    COMPARISON:  09/18/2018    FINDINGS:  Support devices: None    The lungs are clear, with normal appearance of pulmonary vasculature and no pleural effusion or pneumothorax.    The cardiac silhouette is normal in size. The hilar and mediastinal contours are unremarkable.    Bones are intact.                               CT Head Without Contrast (Final result)  Result time 12/15/18 15:11:15    Final result by Alessio Nair Jr., MD (12/15/18 15:11:15)                 Impression:      1. No acute intracranial CT abnormality.  2. Unchanged right middle cerebral artery distribution old infarcts, likely embolic.  All CT scans at this facility use dose modulation, iterative reconstruction, and/or weight base dosing when appropriate to reduce radiation dose to as low as reasonably  achievable.      Electronically signed by: Alessio Nair Jr., MD  Date:    12/15/2018  Time:    15:11             Narrative:    EXAMINATION:  CT HEAD WITHOUT CONTRAST    TECHNIQUE:  Contiguous axial images were obtained from the skull base through the vertex without intravenous contrast.    COMPARISON:  Prior CT of the head from April 4, 2018 was reviewed.    FINDINGS:  No intracranial hemorrhage. No mass effect or midline shift. No extra axial fluid collections. Again demonstrated is a multifocal encephalomalacia within the right middle cerebral artery distribution involving the right frontal lobe and right temporal lobe.  Apparent hyperdensity about the basilar tip is favored to relate to bone shadowing/motion artifact.  The ventricles and sulci are normal in size and configuration. There is no evidence of hydrocephalus. The pineal region is unremarkable. The posterior fossa structures are grossly unremarkable within the limits of CT scan. The paranasal sinuses and mastoid air cells are clear. No fractures are identified. No concerning osseous lesions.                              Assessment/Plan:      * Acute CVA (cerebrovascular accident)    Admit to inpatient   CT head reveals old embolic type infarcts.  Continue statin, aspirin.  MRI showed restricted diffusion in the MCA likely M3 territory peripheral left parietal region, as well as posterior aspect left insula, consistent with acute embolic infarcts.  Neurology consulted  PT/OT evaluate and treat   Speech evaluation   Check 2 D Echo   Bilateral carotid ultrasound            Acute encephalopathy    Unclear etiology, symptoms have now improved  Patient extremely hysterical, crying, talking incomprehensibly.  CT head without acute changes.  Reveals old embolic infarcts.  MRI positive for acute CVA   Neurology consult.  UDS positive for benzodiazepines and amphetamines.  Neuro checks every 4 hr.       Altered mental state    Symptoms resolved        ARIE  (generalized anxiety disorder)    Family stated that as outpatient her PCP has been trying to wean her off benzodiazepines.         COPD (chronic obstructive pulmonary disease)    Stable   Duo neb PRN wheezing or SOB  Supplemental O2 as needed          VTE Risk Mitigation (From admission, onward)        Ordered     enoxaparin injection 40 mg  Daily      12/16/18 1452     IP VTE HIGH RISK PATIENT  Once      12/15/18 2113     Place sequential compression device  Until discontinued      12/15/18 2113              Tee Espinoza NP  Department of Hospital Medicine   [facility]

## 2018-12-16 NOTE — PROGRESS NOTES
PT lying in bed very agitated and crying. PT family refused Ativan Injection at this time. Stating medication will make pt hallucinate.

## 2018-12-16 NOTE — HPI
Ms. Monzon is a 48-year-old  female, who appears significantly older than her stated age, history of generalized anxiety disorder, COPD, TIAs in the past, was last known normal last night, and a starting early this morning she of was confused according to the family members.  She was hallucinating, talking to herself, disoriented, progressively getting worse hence brought to the ED.  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  Patient is extremely anxious and history equal.  Talking nonstop, but unable to comprehend anything.  Does not follow commands, but is moving all of bilateral upper and lower extremities spontaneously.  Patient admitted for acute encephalopathy of unclear etiology.  Family described that as outpatient her PCP has been trying to wean her off of benzodiazepines ) exam recs).  UDS positive for benzodiazepines and amphetamines (family denies any known drug use).

## 2018-12-16 NOTE — SUBJECTIVE & OBJECTIVE
Past Medical History:   Diagnosis Date    COPD (chronic obstructive pulmonary disease)     Depression     GERD (gastroesophageal reflux disease)     Hypercholesteremia     Hypertension     Kidney stone     Stroke     Tobacco use        Past Surgical History:   Procedure Laterality Date    HYSTERECTOMY      KNEE ARTHROSCOPY W/ ACL RECONSTRUCTION Left     LITHOTRIPSY         Review of patient's allergies indicates:   Allergen Reactions    Lorazepam Other (See Comments)     Causes hallucination       No current facility-administered medications on file prior to encounter.      Current Outpatient Medications on File Prior to Encounter   Medication Sig    albuterol 90 mcg/actuation inhaler Inhale 2 puffs into the lungs every 4 (four) hours as needed for Wheezing.    ALPRAZolam (XANAX) 0.25 MG tablet Take 1 tablet (0.25 mg total) by mouth 3 (three) times daily as needed for Anxiety.    aspirin (ECOTRIN) 325 MG EC tablet Take 325 mg by mouth once daily.    atorvastatin (LIPITOR) 40 MG tablet TAKE 1 TABLET BY MOUTH NIGHTLY AVOID GRAPEFRUIT    calcium carbonate (CALCIUM ANTACID) 300 mg (750 mg) Chew Take by mouth 2 (two) times daily as needed.    carvedilol (COREG) 12.5 MG tablet Take 12.5 mg by mouth 2 (two) times daily.     DULoxetine (CYMBALTA) 60 MG capsule Take 60 mg by mouth once daily.    fluticasone-vilanterol (BREO ELLIPTA) 100-25 mcg/dose diskus inhaler 1 puff daily    furosemide (LASIX) 20 MG tablet Take 1 tablet (20 mg total) by mouth once daily. (Patient taking differently: Take 20 mg by mouth 2 (two) times daily. )    gabapentin (NEURONTIN) 600 MG tablet Take 600 mg by mouth 2 (two) times daily.    HYDROcodone-acetaminophen (NORCO) 5-325 mg per tablet Take 1 tablet by mouth every 6 (six) hours as needed for Pain.    neomycin-polymyxin-pramoxine (NEOSPORIN) 3.5-10,000-10 mg-unit-mg/gram Crea Apply topically to right lower extremity wound daily    nicotine 21-14-7 mg/24 hr PTDS      pantoprazole (PROTONIX) 40 MG tablet Take 40 mg by mouth once daily.    potassium chloride SA (K-DUR,KLOR-CON) 10 MEQ tablet Take 1 tablet (10 mEq total) by mouth once daily.    sucralfate (CARAFATE) 100 mg/mL suspension Take 1 g by mouth 4 (four) times daily.     Family History     Problem Relation (Age of Onset)    Breast cancer Mother    Cancer Mother    Kidney disease Mother    Liver disease Mother        Tobacco Use    Smoking status: Former Smoker     Packs/day: 1.00     Types: Cigarettes     Last attempt to quit: 2018     Years since quittin.8    Smokeless tobacco: Never Used    Tobacco comment: patches    Substance and Sexual Activity    Alcohol use: No    Drug use: No    Sexual activity: Not Currently     Partners: Male     Review of Systems   Unable to perform ROS: Mental status change     Objective:     Vital Signs (Most Recent):  Temp: 97.8 °F (36.6 °C) (12/15/18 2058)  Pulse: (!) 124 (12/15/18 2058)  Resp: 18 (12/15/18 2058)  BP: 137/74 (12/15/18 2058)  SpO2: 99 % (12/15/18 2058) Vital Signs (24h Range):  Temp:  [97.8 °F (36.6 °C)-98.2 °F (36.8 °C)] 97.8 °F (36.6 °C)  Pulse:  [] 124  Resp:  [18-22] 18  SpO2:  [95 %-100 %] 99 %  BP: (122-137)/(66-84) 137/74        Body mass index is 42.95 kg/m².    Physical Exam   Constitutional: No distress.   HENT:   Head: Normocephalic and atraumatic.   Eyes: Conjunctivae and EOM are normal.   Neck: Normal range of motion. No tracheal deviation present. No thyromegaly present.   Cardiovascular: Normal rate, regular rhythm and intact distal pulses.   No murmur heard.  Pulmonary/Chest: Effort normal and breath sounds normal. No respiratory distress.   Abdominal: Soft. She exhibits no distension. There is no tenderness.   Musculoskeletal: She exhibits no edema or tenderness.   Neurological: She is alert. She exhibits normal muscle tone.   Moving bilateral upper and lower extremities spontaneously, does not follow commands.  Talking nonstop, but  uncomprehensible, hallucinating, hysterical, crying.   Skin: Skin is warm. She is not diaphoretic.   Psychiatric: Her affect is inappropriate. She is actively hallucinating.         CRANIAL NERVES     CN III, IV, VI   Extraocular motions are normal.        Significant Labs:   ABGs:   Recent Labs   Lab 12/15/18  1945   PH 7.375   PCO2 33.0*   HCO3 19.3*   POCSATURATED 95   BE -6     BMP:   Recent Labs   Lab 12/15/18  1525   GLU 93      K 3.1*      CO2 23   BUN 9   CREATININE 0.8   CALCIUM 9.4   MG 2.2     CBC:   Recent Labs   Lab 12/15/18  1525   WBC 10.54   HGB 14.2   HCT 44.0   *     CMP:   Recent Labs   Lab 12/15/18  1525      K 3.1*      CO2 23   GLU 93   BUN 9   CREATININE 0.8   CALCIUM 9.4   PROT 7.9   ALBUMIN 4.1   BILITOT 0.4   ALKPHOS 102   AST 28   ALT 16   ANIONGAP 11   EGFRNONAA >60     Lactic Acid: No results for input(s): LACTATE in the last 48 hours.  Troponin:   Recent Labs   Lab 12/15/18  1525   TROPONINI 0.013     Urine Studies:   Recent Labs   Lab 12/15/18  1619   COLORU Yellow   APPEARANCEUA Clear   PHUR 7.0   SPECGRAV 1.010   PROTEINUA Negative   GLUCUA Negative   KETONESU Trace*   BILIRUBINUA Negative   OCCULTUA Trace*   NITRITE Negative   UROBILINOGEN Negative   LEUKOCYTESUR Negative     All pertinent labs within the past 24 hours have been reviewed.    Significant Imaging: I have reviewed and interpreted all pertinent imaging results/findings within the past 24 hours.     Imaging Results          CTA Chest Non-Coronary - PE Study (Final result)  Result time 12/15/18 18:31:28    Final result by Freddy Garvey MD (12/15/18 18:31:28)                 Impression:      No pulmonary embolism.    Mild interlobular septal thickening, with diffuse mild ground-glass suggesting vascular congestion, and trace pulmonary edema.  Mild bronchial wall thickening and bronchiectasis.  No pleural effusions.  No consolidation.  Mild tracheomalacia.    Mild cardiomegaly.    All CT  scans at this facility use dose modulation, iterative reconstruction and/or weight based dosing when appropriate to reduce radiation dose to as low as reasonably achievable.      Electronically signed by: Freddy Garvey  Date:    12/15/2018  Time:    18:31             Narrative:    EXAMINATION:  CTA CHEST NON CORONARY    CLINICAL HISTORY:  Dyspnea, cardiac origin suspected;    TECHNIQUE:  Low dose axial images, sagittal and coronal reformations were obtained from the thoracic inlet to the lung bases. Contrast was not administered.    COMPARISON:  12/15/2018    FINDINGS:  Thyroid unremarkable.    No supraclavicular, axillary, mediastinal, or hilar lymphadenopathy.    No evidence of pulmonary embolism.    Mild interlobular septal thickening, with diffuse mild ground-glass suggesting vascular congestion, and trace edema.  Mild bronchial wall thickening and bronchiectasis.  No pleural effusions.  No consolidation.  Mild tracheomalacia.    Mild cardiomegaly.    No acute airway disease.    Images through the upper abdomen demonstrate no acute abnormality.    No suspicious or acute osseous abnormality.  Small sliding hiatal hernia.                               X-Ray Chest AP Portable (Final result)  Result time 12/15/18 15:59:39    Final result by Freddy Garvey MD (12/15/18 15:59:39)                 Impression:      No acute abnormality.      Electronically signed by: Freddy Garvey  Date:    12/15/2018  Time:    15:59             Narrative:    EXAMINATION:  XR CHEST AP PORTABLE    CLINICAL HISTORY:  ams;.    TECHNIQUE:  Single frontal portable view of the chest was performed.    COMPARISON:  09/18/2018    FINDINGS:  Support devices: None    The lungs are clear, with normal appearance of pulmonary vasculature and no pleural effusion or pneumothorax.    The cardiac silhouette is normal in size. The hilar and mediastinal contours are unremarkable.    Bones are intact.                               CT Head Without Contrast  (Final result)  Result time 12/15/18 15:11:15    Final result by Alessio Nair Jr., MD (12/15/18 15:11:15)                 Impression:      1. No acute intracranial CT abnormality.  2. Unchanged right middle cerebral artery distribution old infarcts, likely embolic.  All CT scans at this facility use dose modulation, iterative reconstruction, and/or weight base dosing when appropriate to reduce radiation dose to as low as reasonably achievable.      Electronically signed by: Alessio Nair Jr., MD  Date:    12/15/2018  Time:    15:11             Narrative:    EXAMINATION:  CT HEAD WITHOUT CONTRAST    TECHNIQUE:  Contiguous axial images were obtained from the skull base through the vertex without intravenous contrast.    COMPARISON:  Prior CT of the head from April 4, 2018 was reviewed.    FINDINGS:  No intracranial hemorrhage. No mass effect or midline shift. No extra axial fluid collections. Again demonstrated is a multifocal encephalomalacia within the right middle cerebral artery distribution involving the right frontal lobe and right temporal lobe.  Apparent hyperdensity about the basilar tip is favored to relate to bone shadowing/motion artifact.  The ventricles and sulci are normal in size and configuration. There is no evidence of hydrocephalus. The pineal region is unremarkable. The posterior fossa structures are grossly unremarkable within the limits of CT scan. The paranasal sinuses and mastoid air cells are clear. No fractures are identified. No concerning osseous lesions.                                I have independently reviewed and interpreted the EKG.     I have independently reviewed all pertinent labs within the past 24 hours.    I have independently reviewed, visualized and interpreted all pertinent imaging results within the past 24 hours and discussed the findings with the ED physician, Dr. Arellano.

## 2018-12-16 NOTE — PLAN OF CARE
Problem: SLP Goal  Goal: SLP Goal  LT. Pt will tolerate least restrictive diet consistency safely and efficiently.  2. Pt will communicate needs/ideas effectively.        S.T. Evaluation completed.   Pt recommended for soft mechanical consistency and thin liquids.    She is recommended to continue S.T. For receptive and expressive language.

## 2018-12-16 NOTE — PT/OT/SLP PROGRESS
Physical Therapy      Patient Name:  Valorie Monzon   MRN:  [id number]    PT Eval initiated via Epic chart review.  PT eval to be completed at later time/date.      Kenia Hernández, PT, DPT  12/16/2018  [id number]

## 2018-12-16 NOTE — ED NOTES
Hospital med team contacted about lorazepam (ATIVAN) injection order/administration. MD informed to to proceed with administration. Will inform handoff nurse

## 2018-12-16 NOTE — ASSESSMENT & PLAN NOTE
CT head reveals old embolic type infarcts.  Continue statin, aspirin.  Repeat labs in a.m..  Follow-up on MRI brain.

## 2018-12-17 PROBLEM — R41.82 ALTERED MENTAL STATE: Status: RESOLVED | Noted: 2018-12-16 | Resolved: 2018-12-17

## 2018-12-17 LAB
APTT BLDCRRT: 29.8 SEC
D DIMER PPP IA.FEU-MCNC: 0.84 MG/L FEU
ESTIMATED PA SYSTOLIC PRESSURE: 33.41
FIBRINOGEN PPP-MCNC: 383 MG/DL
HCYS SERPL-SCNC: 10 UMOL/L
HCYS SERPL-SCNC: 10 UMOL/L
INR PPP: 0.9
PROTHROMBIN TIME: 10.3 SEC
RETIRED EF AND QEF - SEE NOTES: 60 (ref 55–65)

## 2018-12-17 PROCEDURE — 25000003 PHARM REV CODE 250: Performed by: NURSE PRACTITIONER

## 2018-12-17 PROCEDURE — 81240 F2 GENE: CPT

## 2018-12-17 PROCEDURE — 85610 PROTHROMBIN TIME: CPT

## 2018-12-17 PROCEDURE — 85300 ANTITHROMBIN III ACTIVITY: CPT

## 2018-12-17 PROCEDURE — 85379 FIBRIN DEGRADATION QUANT: CPT

## 2018-12-17 PROCEDURE — 25500020 PHARM REV CODE 255: Performed by: INTERNAL MEDICINE

## 2018-12-17 PROCEDURE — 86147 CARDIOLIPIN ANTIBODY EA IG: CPT

## 2018-12-17 PROCEDURE — 92507 TX SP LANG VOICE COMM INDIV: CPT

## 2018-12-17 PROCEDURE — G8988 SELF CARE GOAL STATUS: HCPCS | Mod: CJ

## 2018-12-17 PROCEDURE — 25000003 PHARM REV CODE 250: Performed by: INTERNAL MEDICINE

## 2018-12-17 PROCEDURE — G8978 MOBILITY CURRENT STATUS: HCPCS | Mod: CI | Performed by: PHYSICAL THERAPIST

## 2018-12-17 PROCEDURE — 81241 F5 GENE: CPT

## 2018-12-17 PROCEDURE — 63600175 PHARM REV CODE 636 W HCPCS: Performed by: NURSE PRACTITIONER

## 2018-12-17 PROCEDURE — 85384 FIBRINOGEN ACTIVITY: CPT

## 2018-12-17 PROCEDURE — G8979 MOBILITY GOAL STATUS: HCPCS | Mod: CI | Performed by: PHYSICAL THERAPIST

## 2018-12-17 PROCEDURE — G8980 MOBILITY D/C STATUS: HCPCS | Mod: CI | Performed by: PHYSICAL THERAPIST

## 2018-12-17 PROCEDURE — 85305 CLOT INHIBIT PROT S TOTAL: CPT

## 2018-12-17 PROCEDURE — 36415 COLL VENOUS BLD VENIPUNCTURE: CPT

## 2018-12-17 PROCEDURE — 21400001 HC TELEMETRY ROOM

## 2018-12-17 PROCEDURE — 85598 HEXAGNAL PHOSPH PLTLT NEUTRL: CPT

## 2018-12-17 PROCEDURE — 85730 THROMBOPLASTIN TIME PARTIAL: CPT

## 2018-12-17 PROCEDURE — S4991 NICOTINE PATCH NONLEGEND: HCPCS | Performed by: NURSE PRACTITIONER

## 2018-12-17 PROCEDURE — G8987 SELF CARE CURRENT STATUS: HCPCS | Mod: CJ

## 2018-12-17 PROCEDURE — 97535 SELF CARE MNGMENT TRAINING: CPT

## 2018-12-17 PROCEDURE — 83695 ASSAY OF LIPOPROTEIN(A): CPT

## 2018-12-17 PROCEDURE — 85613 RUSSELL VIPER VENOM DILUTED: CPT

## 2018-12-17 PROCEDURE — 85303 CLOT INHIBIT PROT C ACTIVITY: CPT

## 2018-12-17 PROCEDURE — 97161 PT EVAL LOW COMPLEX 20 MIN: CPT | Performed by: PHYSICAL THERAPIST

## 2018-12-17 PROCEDURE — 97165 OT EVAL LOW COMPLEX 30 MIN: CPT

## 2018-12-17 PROCEDURE — 83090 ASSAY OF HOMOCYSTEINE: CPT

## 2018-12-17 PROCEDURE — 97116 GAIT TRAINING THERAPY: CPT | Performed by: PHYSICAL THERAPIST

## 2018-12-17 PROCEDURE — 86147 CARDIOLIPIN ANTIBODY EA IG: CPT | Mod: 59

## 2018-12-17 PROCEDURE — G8989 SELF CARE D/C STATUS: HCPCS | Mod: CJ

## 2018-12-17 RX ADMIN — DULOXETINE 60 MG: 30 CAPSULE, DELAYED RELEASE ORAL at 10:12

## 2018-12-17 RX ADMIN — ASPIRIN 325 MG: 325 TABLET, DELAYED RELEASE ORAL at 10:12

## 2018-12-17 RX ADMIN — ACETAMINOPHEN 650 MG: 325 TABLET ORAL at 07:12

## 2018-12-17 RX ADMIN — Medication 1 PATCH: at 10:12

## 2018-12-17 RX ADMIN — ENOXAPARIN SODIUM 40 MG: 100 INJECTION SUBCUTANEOUS at 04:12

## 2018-12-17 RX ADMIN — ATORVASTATIN CALCIUM 40 MG: 40 TABLET, FILM COATED ORAL at 10:12

## 2018-12-17 RX ADMIN — IOHEXOL 100 ML: 350 INJECTION, SOLUTION INTRAVENOUS at 06:12

## 2018-12-17 RX ADMIN — PANTOPRAZOLE SODIUM 40 MG: 40 TABLET, DELAYED RELEASE ORAL at 10:12

## 2018-12-17 NOTE — PT/OT/SLP EVAL
Occupational Therapy   Evaluation and Discharge Note    Name: Valorie Monzon  MRN: [id number]  Admitting Diagnosis:  Acute CVA (cerebrovascular accident)      Recommendations:     Discharge Recommendations: other (see comments)(out patient speech therapy)  Discharge Equipment Recommendations:  none  Barriers to discharge:       History:     Occupational Profile:  Living Environment: lives with boy friend in mobile home   Previous level of function: (i) with adl's and functional mobility. Pt reports still drives  Roles and Routines: occupational therapy  Equipment Used at home:  none  Assistance upon Discharge:     Past Medical History:   Diagnosis Date    COPD (chronic obstructive pulmonary disease)     Depression     GERD (gastroesophageal reflux disease)     Hypercholesteremia     Hypertension     Kidney stone     Stroke     Tobacco use        Past Surgical History:   Procedure Laterality Date    HYSTERECTOMY      KNEE ARTHROSCOPY W/ ACL RECONSTRUCTION Left     LITHOTRIPSY         Subjective     Chief Complaint:  Patient/Family Comments/goals:     Pain/Comfort:  · Pain Rating 1: 0/10    Patients cultural, spiritual, Religion conflicts given the current situation:      Objective:     Communicated with: nurse Granados and epic chart review prior to session.  Patient found all lines intact, call button in reach and nurse notified and   upon OT entry to room.    General Precautions: Standard, aphasia   Orthopedic Precautions:N/A   Braces:       Occupational Performance:    Functional Mobility/Transfers:  · Patient completed Sit <> Stand Transfer with independence  with  no assistive device   · Patient completed Bed <> Chair Transfer using Step Transfer technique with independence with no assistive device  · Functional Mobility: pt ambulated 25 feet with (I) with no ae    Activities of Daily Living:  · Upper Body Dressing: independence .  · Lower Body Dressing: independence .    Cognitive/Visual  "Perceptual:  Cognitive/Psychosocial Skills:     -       Oriented to: Person, Place, Time and Situation   -       Follows Commands/attention:Follows multistep  commands  -       Communication: clear/fluent  -       Memory: No Deficits noted  -       Safety awareness/insight to disability: intact   Visual/Perceptual:      -Intact .    Physical Exam:  Upper Extremity Range of Motion:     -       Right Upper Extremity: WFL  -       Left Upper Extremity: WFL  Upper Extremity Strength:    -       Right Upper Extremity: WFL  -       Left Upper Extremity: WFL   Strength:    -       Right Upper Extremity: WFL  -       Left Upper Extremity: WFL    AMPAC 6 Click ADL:  AMPAC Total Score: 24    Treatment & Education:    Education:    Patient left with bed in chair position with all lines intact, call button in reach and nurse notified    Assessment:     Valorie Monzon is a 48 y.o. female with a medical diagnosis of Acute CVA (cerebrovascular accident). At this time, patient is functioning at their prior level of function and does not require further acute OT services.     Clinical Decision Makin.  OT Low:  "Pt evaluation falls under low complexity for evaluation coding due to performance deficits noted in 1-3 areas as stated above and 0 co-morbities affecting current functional status. Data obtained from problem focused assessments. No modifications or assistance was required for completion of evaluation. Only brief occupational profile and history review completed."     Plan:     During this hospitalization, patient does not require further acute OT services.  Please re-consult if situation changes.    · Plan of Care Reviewed with: patient   · Pt placed on discharge from skilled O.T. And placed on people 's program    This Plan of care has been discussed with the patient who was involved in its development and understands and is in agreement with the identified goals and treatment plan    GOALS: "   Multidisciplinary Problems     Occupational Therapy Goals     Not on file                Time Tracking:     OT Date of Treatment: 12/17/18  OT Start Time: 1050  OT Stop Time: 1115  OT Total Time (min): 25 min    Billable Minutes:Evaluation 10 minutes  Therapeutic Activity 15 minutes    Fidelia Morgan OT  12/17/2018

## 2018-12-17 NOTE — PLAN OF CARE
Problem: SLP Goal  Goal: SLP Goal  LT. Pt will tolerate least restrictive diet consistency safely and efficiently.  2. Pt will communicate needs/ideas effectively.    ST. BSA with full meal with further swallowing goals as appropriate.  2. Follow 2-3 step commands with 90%  3. Answer simple yes/no questions with 90%  4. Complete simple naming tasks with 80%  5. Complete WAB with goals as appropriate   Outcome: Ongoing (interventions implemented as appropriate)  Pt completed rote tasks with mod cueing, object ID with mod cueing, yes/no questions with 85% acc, reading tasks with min cueing, writing tasks with max cueing

## 2018-12-17 NOTE — SUBJECTIVE & OBJECTIVE
No complaints voiced by patient. She stated she would stop smoking. Working with PT/OT/ST.     Review of Systems   Constitutional: Negative for chills and fever.   HENT: Negative for congestion, rhinorrhea and sinus pressure.    Respiratory: Negative for apnea, cough, choking, chest tightness, shortness of breath, wheezing and stridor.    Cardiovascular: Negative for chest pain, palpitations and leg swelling.   Gastrointestinal: Negative for abdominal distention, abdominal pain, diarrhea, nausea and vomiting.   Endocrine: Negative for cold intolerance and heat intolerance.   Genitourinary: Negative for difficulty urinating and hematuria.   Musculoskeletal: Negative for arthralgias and joint swelling.   Skin: Negative for color change, pallor and rash.   Neurological: Positive for speech difficulty. Negative for dizziness, seizures, weakness, numbness and headaches.   Psychiatric/Behavioral: Negative for agitation. The patient is not nervous/anxious.      Objective:     Vital Signs (Most Recent):  Temp: 97.7 °F (36.5 °C) (12/17/18 1130)  Pulse: 95 (12/17/18 1138)  Resp: 16 (12/17/18 1130)  BP: 122/75 (12/17/18 1130)  SpO2: 96 % (12/17/18 1130) Vital Signs (24h Range):  Temp:  [96.5 °F (35.8 °C)-98.3 °F (36.8 °C)] 97.7 °F (36.5 °C)  Pulse:  [] 95  Resp:  [16-20] 16  SpO2:  [94 %-98 %] 96 %  BP: (104-131)/(62-83) 122/75     Weight: 110.8 kg (244 lb 4.3 oz)  Body mass index is 39.43 kg/m².    Intake/Output Summary (Last 24 hours) at 12/17/2018 1438  Last data filed at 12/16/2018 1800  Gross per 24 hour   Intake 920 ml   Output --   Net 920 ml      Physical Exam   Constitutional: She is oriented to person, place, and time. She appears well-developed and well-nourished. No distress.   HENT:   Head: Normocephalic and atraumatic.   Eyes: Conjunctivae and EOM are normal. Pupils are equal, round, and reactive to light. Right eye exhibits no discharge. Left eye exhibits no discharge.   Neck: Neck supple. No JVD present.    Cardiovascular: Normal rate and regular rhythm. Exam reveals no gallop and no friction rub.   No murmur heard.  Pulmonary/Chest: Effort normal and breath sounds normal. No stridor. No respiratory distress. She has no wheezes. She has no rales. She exhibits no tenderness.   Abdominal: Soft. Bowel sounds are normal. She exhibits no distension and no mass. There is no tenderness. There is no rebound and no guarding. No hernia.   obese   Musculoskeletal: Normal range of motion. She exhibits no edema or deformity.   Neurological: She is alert and oriented to person, place, and time. A cranial nerve deficit is present.   Expressive aphasia    Skin: Skin is warm and dry. Capillary refill takes less than 2 seconds. She is not diaphoretic.   Psychiatric: She has a normal mood and affect. Her behavior is normal.   Nursing note and vitals reviewed.      Significant Labs:   CBC:   Recent Labs   Lab 12/15/18  1525 12/16/18  0735   WBC 10.54 6.49   HGB 14.2 12.4   HCT 44.0 39.3   * 276     CMP:   Recent Labs   Lab 12/15/18  1525 12/16/18  0735    140   K 3.1* 3.7    111*   CO2 23 22*   GLU 93 84   BUN 9 6   CREATININE 0.8 0.7   CALCIUM 9.4 8.3*   PROT 7.9  --    ALBUMIN 4.1  --    BILITOT 0.4  --    ALKPHOS 102  --    AST 28  --    ALT 16  --    ANIONGAP 11 7*   EGFRNONAA >60 >60     All pertinent labs within the past 24 hours have been reviewed.    Significant Imaging: I have reviewed all pertinent imaging results/findings within the past 24 hours.

## 2018-12-17 NOTE — PLAN OF CARE
Problem: Adult Inpatient Plan of Care  Goal: Plan of Care Review  Outcome: Ongoing (interventions implemented as appropriate)  Patient stable. Plan of care reviewed. Patient verbalizes understanding. Pimentel cath removed, pt able to void. Patient continues with expressive aphasia. Family present at bedside. Bed low, wheels locked, bed alarm on, call light in reach. Patient instructed to call for assistance. Will continue to monitor.

## 2018-12-17 NOTE — CONSULTS
Nand[facility] at [phone]. Staff will contact pt with an appointment time.

## 2018-12-17 NOTE — PLAN OF CARE
Sw met with patient at bedside to complete assessment. Pt asked Sw to speak with her boyfriend on her cell phone to answer questions. Pt lives with her boyfriend and her adult daughter. Patient denies any post hospital needs or services at this time. Transitional Care Folder, Discharge Planning Begins on Admission pamphlet, [facility] Bedside Delivery pamphlet, Advance Directive information given to patient along with the contact information given.Instructed patient or family to call with any questions or concerns.    Pt uses   [facility] - [facility] [address]  [address] [address]  Phone: [phone] Fax: [phone]    [facility] - [facility] [address] AT [address]  [address] [address]  Phone: [phone] Fax: [phone]       12/17/18 1607   Discharge Assessment   Assessment Type Discharge Planning Assessment   Confirmed/corrected address and phone number on facesheet? Yes   Assessment information obtained from? Patient;Caregiver   Communicated expected length of stay with patient/caregiver yes   Prior to hospitilization cognitive status: Alert/Oriented   Prior to hospitalization functional status: Independent   Current cognitive status: Alert/Oriented   Current Functional Status: Independent   Lives With significant other;child(isatu), adult   Able to Return to Prior Arrangements yes   Is patient able to care for self after discharge? Yes   Who are your caregiver(s) and their phone number(s)? vincent Cruz, [phone]   Patient's perception of discharge disposition home or selfcare   Readmission Within the Last 30 Days no previous admission in last 30 days   Patient currently being followed by outpatient case management? No   Patient currently receives any other outside agency services? No   Equipment Currently Used at Home none   Do you have any  problems affording any of your prescribed medications? TBD   Is the patient taking medications as prescribed? yes   Does the patient have transportation home? Yes   Transportation Anticipated family or friend will provide   Does the patient receive services at the [facility]? No   Discharge Plan A Home with family   Patient/Family in Agreement with Plan yes

## 2018-12-17 NOTE — CONSULTS
Subjective:       Patient ID: Valorie Monzon is a 48 y.o. female.    Chief Complaint: Altered Mental Status (Patient has slurred speech this morning and altered LOC. Unable to fully answer questions consistently.)    HPI     Ms. Monzon is a 48-year-old  female, who appears significantly older than her stated age, history of generalized anxiety disorder, COPD, TIAs in the past.    The patient was last seen normal was the night PTA. She was admitted for mental status changes and communication problems.  CTH showed RMCA and LMCA distribution infarct. EKG showed NSR. Brain MRI showed evidence of acute LMCA M3-LT PLM infarct. She was outside the window of t-PA. Carotid Doppler is unremarkable and TTE with bubble is pending . UDS showed amphetamine. She was started on ASA, Statin and Nicontine patch.    I saw the patient at [facility] in 2016 with almost identical presentation. Extensive HUFFMAN including CTA H/N, EKG, TTE, Telemetry and Hypercoagulabilty were unremarkable. UDS showed THC in the urine.         Review of Systems      Unobtianble due to Global Aphasia     Current Facility-Administered Medications:     acetaminophen tablet 650 mg, 650 mg, Oral, Q6H PRN, Tee Espinoza NP, 650 mg at 12/16/18 1724    aspirin EC tablet 325 mg, 325 mg, Oral, Daily, Chinedu Rodrigez MD, 325 mg at 12/16/18 1230    atorvastatin tablet 40 mg, 40 mg, Oral, Daily, Chinedu Rodrigez MD, 40 mg at 12/16/18 1230    DULoxetine DR capsule 60 mg, 60 mg, Oral, Daily, Chinedu Rodrigez MD, 60 mg at 12/16/18 1230    enoxaparin injection 40 mg, 40 mg, Subcutaneous, Daily, Tee Espinoza NP, 40 mg at 12/16/18 1724    nicotine 14 mg/24 hr 1 patch, 1 patch, Transdermal, Daily, Tee Espinoza NP, 1 patch at 12/16/18 1810    pantoprazole EC tablet 40 mg, 40 mg, Oral, Daily, Chinedu Rodrigez MD, 40 mg at 12/16/18 1230  Past Medical History:   Diagnosis Date    COPD (chronic obstructive pulmonary disease)     Depression     GERD  (gastroesophageal reflux disease)     Hypercholesteremia     Hypertension     Kidney stone     Stroke     Tobacco use      Past Surgical History:   Procedure Laterality Date    HYSTERECTOMY      KNEE ARTHROSCOPY W/ ACL RECONSTRUCTION Left     LITHOTRIPSY       Social History     Socioeconomic History    Marital status: Legally      Spouse name: Not on file    Number of children: Not on file    Years of education: Not on file    Highest education level: Not on file   Social Needs    Financial resource strain: Not on file    Food insecurity - worry: Not on file    Food insecurity - inability: Not on file    Transportation needs - medical: Not on file    Transportation needs - non-medical: Not on file   Occupational History    Not on file   Tobacco Use    Smoking status: Former Smoker     Packs/day: 1.00     Types: Cigarettes     Last attempt to quit: 2018     Years since quittin.8    Smokeless tobacco: Never Used    Tobacco comment: patches    Substance and Sexual Activity    Alcohol use: No    Drug use: No    Sexual activity: Not Currently     Partners: Male   Other Topics Concern    Not on file   Social History Narrative    Not on file       Objective:     GENERAL APPEARANCE:     The patient looks anxious and agitated     Normal breathing pattern.    No dysmorphic features    Normal eye contact.     GENERAL MEDICAL EXAM:    HEENT:  Head is atraumatic normocephalic. No tender temporal arteries.     Neck and Axillae: No JVD. No carotid bruits. No thyromegaly. No lymphadenopathy.    Cardiopulmonary: No cyanosis. No tachypnea. Normal respiratory effort.  Clear breath sounds. Normal heart sounds with regular rhythm and no murmurs.    Gastrointestinal:  No stomas or lesions. No hernias.  Abdomen is soft non-tender. No masses or organomegaly.    Skin, Hair and Nails: No pathognonomic skin rash. No neurofibromatosis.   No stigmata of autoimmune disease.     Limbs: No varicose  veins. No edema. Symmetric pulses.     Muskoskeletal: No deformities.No spine tenderness.   No signs of longstanding neuropathy. No dislocations or fractures.        Neurologic Exam     Mental Status   Concentration: normal.   Level of consciousness: alert  Unable to name object. Unable to read. Unable to repeat. Unable to write. Abnormal comprehension.     Global Aphasia      Cranial Nerves     CN II   Visual fields full to confrontation.   Visual acuity: normal  Right visual field deficit: none  Left visual field deficit: none     CN III, IV, VI   Pupils are equal, round, and reactive to light.  Extraocular motions are normal.   Right pupil: Size: 2 mm. Shape: regular. Reactivity: brisk. Consensual response: intact. Accommodation: intact.   Left pupil: Size: 2 mm. Shape: regular. Reactivity: brisk. Consensual response: intact. Accommodation: intact.   CN III: no CN III palsy  CN VI: no CN VI palsy  Nystagmus: none   Diplopia: none  Ophthalmoparesis: none  Upgaze: normal  Downgaze: normal  Conjugate gaze: present  Vestibulo-ocular reflex: present    CN V   Facial sensation intact.   Right facial sensation deficit: none  Left facial sensation deficit: none  Right corneal reflex: normal  Left corneal reflex: normal    CN VII   Right facial weakness: none  Left facial weakness: none  Right taste: normal  Left taste: normal    CN VIII   CN VIII normal.   Hearing: intact  Right Rinne: AC > BC  Left Rinne: AC > BC  Dickerson: does not lateralize     CN IX, X   CN IX normal.   CN X normal.   Palate: symmetric  Right gag reflex: normal  Left gag reflex: normal    CN XI   CN XI normal.   Right sternocleidomastoid strength: normal  Left sternocleidomastoid strength: normal  Right trapezius strength: normal  Left trapezius strength: normal    CN XII   CN XII normal.   Tongue: not atrophic  Fasciculations: absent  Tongue deviation: none    Motor Exam   Muscle bulk: normal  Overall muscle tone: normal  Right arm tone: normal  Left  arm tone: normal  Right arm pronator drift: absent  Left arm pronator drift: absent  Right leg tone: normal  Left leg tone: normal    Strength   Strength 5/5 throughout.   Right neck flexion: 5/5  Left neck flexion: 5/5  Right neck extension: 5/5  Left neck extension: 5/5  Right deltoid: 5/5  Left deltoid: 5/5  Right biceps: 5/5  Left biceps: 5/5  Right triceps: 5/5  Left triceps: 5/5  Right wrist flexion: 5/5  Left wrist flexion: 5/5  Right wrist extension: 5/5  Left wrist extension: 5/5  Right interossei: 5/5  Left interossei: 5/5  Right abdominals: 5/5  Left abdominals: 5/5  Right iliopsoas: 5/5  Left iliopsoas: 5/5  Right quadriceps: 5/5  Left quadriceps: 5/5  Right hamstrin/5  Left hamstrin/5  Right glutei: 5/5  Left glutei: 5/5  Right anterior tibial: 5/5  Left anterior tibial: 5/5  Right posterior tibial: 5/5  Left posterior tibial: 5/5  Right peroneal: 5/5  Left peroneal: 5/5  Right gastroc: 5/5  Left gastroc: 5/5    Sensory Exam   Light touch normal.   Right arm light touch: normal  Left arm light touch: normal  Right leg light touch: normal  Left leg light touch: normal  Vibration normal.   Right arm vibration: normal  Left arm vibration: normal  Right leg vibration: normal  Left leg vibration: normal  Proprioception normal.   Right arm proprioception: normal  Left arm proprioception: normal  Right leg proprioception: normal  Left leg proprioception: normal  Pinprick normal.   Right arm pinprick: normal  Left arm pinprick: normal  Right leg pinprick: normal  Left leg pinprick: normal  Graphesthesia: normal  Stereognosis: normal    Gait, Coordination, and Reflexes     Gait  Gait: normal    Coordination   Romberg: negative  Finger to nose coordination: normal  Heel to shin coordination: normal  Tandem walking coordination: normal    Tremor   Resting tremor: absent  Intention tremor: absent  Action tremor: absent    Reflexes   Right brachioradialis: 2+  Left brachioradialis: 2+  Right biceps: 2+  Left  biceps: 2+  Right triceps: 2+  Left triceps: 2+  Right patellar: 2+  Left patellar: 2+  Right achilles: 2+  Left achilles: 2+  Right : 2+  Left : 2+  Right plantar: normal  Left plantar: normal  Right Leon: absent  Left Leon: absent  Right ankle clonus: absent  Left ankle clonus: absent  Right pendular knee jerk: absent  Left pendular knee jerk: absent      Lab Results   Component Value Date    WBC 6.49 12/16/2018    HGB 12.4 12/16/2018    HCT 39.3 12/16/2018    MCV 88 12/16/2018     12/16/2018     Sodium   Date Value Ref Range Status   12/16/2018 140 136 - 145 mmol/L Final     Potassium   Date Value Ref Range Status   12/16/2018 3.7 3.5 - 5.1 mmol/L Final     Chloride   Date Value Ref Range Status   12/16/2018 111 (H) 95 - 110 mmol/L Final     CO2   Date Value Ref Range Status   12/16/2018 22 (L) 23 - 29 mmol/L Final     Glucose   Date Value Ref Range Status   12/16/2018 84 70 - 110 mg/dL Final     BUN, Bld   Date Value Ref Range Status   12/16/2018 6 6 - 20 mg/dL Final     Creatinine   Date Value Ref Range Status   12/16/2018 0.7 0.5 - 1.4 mg/dL Final     Calcium   Date Value Ref Range Status   12/16/2018 8.3 (L) 8.7 - 10.5 mg/dL Final     Total Protein   Date Value Ref Range Status   12/15/2018 7.9 6.0 - 8.4 g/dL Final     Albumin   Date Value Ref Range Status   12/15/2018 4.1 3.5 - 5.2 g/dL Final     Total Bilirubin   Date Value Ref Range Status   12/15/2018 0.4 0.1 - 1.0 mg/dL Final     Comment:     For infants and newborns, interpretation of results should be based  on gestational age, weight and in agreement with clinical  observations.  Premature Infant recommended reference ranges:  Up to 24 hours.............<8.0 mg/dL  Up to 48 hours............<12.0 mg/dL  3-5 days..................<15.0 mg/dL  6-29 days.................<15.0 mg/dL       Alkaline Phosphatase   Date Value Ref Range Status   12/15/2018 102 55 - 135 U/L Final     AST   Date Value Ref Range Status   12/15/2018 28 10 - 40  U/L Final     ALT   Date Value Ref Range Status   12/15/2018 16 10 - 44 U/L Final     Anion Gap   Date Value Ref Range Status   12/16/2018 7 (L) 8 - 16 mmol/L Final     eGFR if    Date Value Ref Range Status   12/16/2018 >60 >60 mL/min/1.73 m^2 Final     eGFR if non    Date Value Ref Range Status   12/16/2018 >60 >60 mL/min/1.73 m^2 Final     Comment:     Calculation used to obtain the estimated glomerular filtration  rate (eGFR) is the CKD-EPI equation.        Lab Results   Component Value Date    PESRAETB36 517 12/15/2018     Lab Results   Component Value Date    TSH 1.416 12/15/2018     Reviewed the neuroimaging independently       CTH     Remote RMCA and LMCA Infarcts    MRI    Acute LT PL-M3 of LMCA Infarct        Carotid Doppler    Unremarkable    EKG    NSR    TTE     Pending    UDS    Amphetamine     Assessment:       1. Altered mental state    2. Slurred speech    3. Illicit drug use    4. TIA (transient ischemic attack)          Acute LT MCA Infarct (Amphetamine-induced )    Polysubstance Abuse-Amphetamine      Plan:       The most likely etiology is Amphetamine abuse     Neurochecks     CTA head and neck     TTE pending     Cardiac monitoring     Hypercoagulability workup.    No indication for anticoagulation.     No indication for surgical intervention     Continue ASA    Medical supportive care    ST          Please do not hesitate to contact me with any updates, questions or concerns.        Debi Ortega MD, FAAN    Attending Neurologist/Epileptologist         Diplomate, American Board-Psychiatry and Neurology (Neurology)    Diplomate, American Board-Clinical Neurophysiology (Epilpesy-Neuromuscular)     Fellow, American Academy of Neurology

## 2018-12-17 NOTE — PT/OT/SLP EVAL
Physical Therapy Evaluation and Discharge Note    Patient Name:  Valorie Monzon   MRN:  [id number]    Recommendations:     Discharge Recommendations:      Discharge Equipment Recommendations: none   Barriers to discharge: None    Assessment:     Valorie Monzon is a 48 y.o. female admitted with a medical diagnosis of Acute CVA (cerebrovascular accident). .  At this time, patient is functioning at their prior level of function and does not require further acute PT services.     Recent Surgery: * No surgery found *      Plan:     During this hospitalization, patient does not require further acute PT services.  Please re-consult if situation changes.      Subjective     Chief Complaint: expressive aphasia   Patient/Family Comments/goals: to return home  Pain/Comfort:  · Pain Rating 1: 0/10  · Pain Rating Post-Intervention 1: 0/10    Patients cultural, spiritual, Yazidi conflicts given the current situation: no    Living Environment:  Pt lives with her boyfriend and her daughter in [facility]. Pt unable to say how many steps to enter.  Prior to admission, patients level of function was (I) ADLs and ambulation.  Equipment used at home: none.  DME owned (not currently used): none.  Upon discharge, patient will have assistance from family.    Objective:     Communicated with Nurse Granados and Epic chart review prior to session.  Patient found supine in bed upon PT entry to room found with: telemetry     General Precautions: Standard, aphasia, fall   Orthopedic Precautions:N/A   Braces: N/A     Exams:  · RLE ROM: WNL  · RLE Strength: WFL  · LLE ROM: WNL  · LLE Strength: WFL    Functional Mobility:  · Bed Mobility:     · Rolling Left:  independence  · Rolling Right: independence  · Scooting: independence  · Supine to Sit: independence  · Transfers:     · Sit to Stand:  modified independence with no AD  · Bed to Chair: modified independence with  no AD  using  Step Transfer  · Gait: Supervision, ~220ft, no AD  · Balance:  Good    AM-PAC 6 CLICK MOBILITY  Total Score:21       Therapeutic Activities and Exercises:   PT eval completed as listed above. Pt functioning at high level therefore does not require skilled PT. Pt is discharged from skilled PT to [facility] for continued ambulation.     AM-PAC 6 CLICK MOBILITY  Total Score:21     Patient left sitting on sofa with all lines intact, call button in reach and Nurse Roberta notified.    GOALS:   Multidisciplinary Problems     Physical Therapy Goals     Not on file                History:     Past Medical History:   Diagnosis Date    COPD (chronic obstructive pulmonary disease)     Depression     GERD (gastroesophageal reflux disease)     Hypercholesteremia     Hypertension     Kidney stone     Stroke     Tobacco use        Past Surgical History:   Procedure Laterality Date    HYSTERECTOMY      KNEE ARTHROSCOPY W/ ACL RECONSTRUCTION Left     LITHOTRIPSY         Clinical Decision Making:     History  Co-morbidities and personal factors that may impact the plan of care Examination  Body Structures and Functions, activity limitations and participation restrictions that may impact the plan of care Clinical Presentation   Decision Making/ Complexity Score   Co-morbidities:   [] Time since onset of injury / illness / exacerbation  [] Status of current condition  []Patient's cognitive status and safety concerns    [] Multiple Medical Problems (see med hx)  Personal Factors:   [] Patient's age  [] Prior Level of function   [] Patient's home situation (environment and family support)  [] Patient's level of motivation  [] Expected progression of patient      HISTORY:(criteria)    [] 33744 - no personal factors/history    [] 15922 - has 1-2 personal factor/comorbidity     [] 03553 - has >3 personal factor/comorbidity     Body Regions:  [] Objective examination findings  [] Head     []  Neck  [] Trunk   [] Upper Extremity  [] Lower Extremity    Body Systems:  [] For  communication ability, affect, cognition, language, and learning style: the assessment of the ability to make needs known, consciousness, orientation (person, place, and time), expected emotional /behavioral responses, and learning preferences (eg, learning barriers, education  needs)  [] For the neuromuscular system: a general assessment of gross coordinated movement (eg, balance, gait, locomotion, transfers, and transitions) and motor function  (motor control and motor learning)  [] For the musculoskeletal system: the assessment of gross symmetry, gross range of motion, gross strength, height, and weight  [] For the integumentary system: the assessment of pliability(texture), presence of scar formation, skin color, and skin integrity  [] For cardiovascular/pulmonary system: the assessment of heart rate, respiratory rate, blood pressure, and edema     Activity limitations:    [] Patient's cognitive status and saf ety concerns          [] Status of current condition      [] Weight bearing restriction  [] Cardiopulmunary Restriction    Participation Restrictions:   [] Goals and goal agreement with the patient     [] Rehab potential (prognosis) and probable outcome      Examination of Body System: (criteria)    [] 48619 - addressing 1-2 elements    [] 03565 - addressing a total of 3 or more elements     [] 56949 -  Addressing a total of 4 or more elements         Clinical Presentation: (criteria)  Choose one     On examination of body system using standardized tests and measures patient presents with (CHOOSE ONE) elements from any of the following: body structures and functions, activity limitations, and/or participation restrictions.  Leading to a clinical presentation that is considered (CHOOSE ONE)                              Clinical Decision Making  (Eval Complexity):  Choose One     Time Tracking:     PT Received On: 12/17/18  PT Start Time: 0805     PT Stop Time: 0830  PT Total Time (min): 25 min     Billable  Minutes: Evaluation 15 min  and Gait Training 10 min      Julianne Vale, PT/OT  12/17/2018

## 2018-12-17 NOTE — PROGRESS NOTES
[facility] Hospital Medicine  Progress Note    Patient Name: Valorie Monzon  MRN: [id number]  Patient Class: IP- Inpatient   Admission Date: 12/15/2018  Length of Stay: 1 days  Attending Physician: Norbert Henry, *  Primary Care Provider: Karri Levy MD        Subjective:     Principal Problem:Acute CVA (cerebrovascular accident)    HPI:  Ms. Monzon is a 48-year-old  female, who appears significantly older than her stated age, history of generalized anxiety disorder, COPD, TIAs in the past, was last known normal last night, and a starting early this morning she of was confused according to the family members.  She was hallucinating, talking to herself, disoriented, progressively getting worse hence brought to the ED.  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  Patient is extremely anxious and history equal.  Talking nonstop, but unable to comprehend anything.  Does not follow commands, but is moving all of bilateral upper and lower extremities spontaneously.  Patient admitted for acute encephalopathy of unclear etiology.  Family described that as outpatient her PCP has been trying to wean her off of benzodiazepines ) exam recs).  UDS positive for benzodiazepines and amphetamines (family denies any known drug use).    Hospital Course:  Ms Monzon is a 48 year old female who presented to [facility] Emergency Room with Altered Mental Status with difficult speaking. Pt and her son reports symptoms started yesterday morning after 3 am yesterday morning. CT of head showed no acute intracranial abnormality, however MRI Restricted diffusion in the MCA likely M3 territory peripheral left parietal region, as well as posterior aspect left insula, consistent with acute embolic infarcts. Neurology consult, reviewed MRI results with Dr Ortega. Dr. Ortega saw the patient and agreed  with the ASA and STATIN medications. MARANDA with bubble study noted severe left atrial enlargement, preserved LVSF and indeterminate LV diastolic function. No reports of patent foramen ovale. CTA of Head and Neck results pending, Carotid US was negative for stenosis. Labs to rule out hypercoagulable state are pending. PT/OT evaluated patient and she did not require their services as she was high functioning. Pt with global aphasia and Speech Therapy was initiated; pt determined to need mechanical soft diet with thin liquids.     No complaints voiced by patient. She stated she would stop smoking. Working with PT/OT/ST.     Review of Systems   Constitutional: Negative for chills and fever.   HENT: Negative for congestion, rhinorrhea and sinus pressure.    Respiratory: Negative for apnea, cough, choking, chest tightness, shortness of breath, wheezing and stridor.    Cardiovascular: Negative for chest pain, palpitations and leg swelling.   Gastrointestinal: Negative for abdominal distention, abdominal pain, diarrhea, nausea and vomiting.   Endocrine: Negative for cold intolerance and heat intolerance.   Genitourinary: Negative for difficulty urinating and hematuria.   Musculoskeletal: Negative for arthralgias and joint swelling.   Skin: Negative for color change, pallor and rash.   Neurological: Positive for speech difficulty. Negative for dizziness, seizures, weakness, numbness and headaches.   Psychiatric/Behavioral: Negative for agitation. The patient is not nervous/anxious.      Objective:     Vital Signs (Most Recent):  Temp: 97.7 °F (36.5 °C) (12/17/18 1130)  Pulse: 95 (12/17/18 1138)  Resp: 16 (12/17/18 1130)  BP: 122/75 (12/17/18 1130)  SpO2: 96 % (12/17/18 1130) Vital Signs (24h Range):  Temp:  [96.5 °F (35.8 °C)-98.3 °F (36.8 °C)] 97.7 °F (36.5 °C)  Pulse:  [] 95  Resp:  [16-20] 16  SpO2:  [94 %-98 %] 96 %  BP: (104-131)/(62-83) 122/75     Weight: 110.8 kg (244 lb 4.3 oz)  Body mass index is 39.43  kg/m².    Intake/Output Summary (Last 24 hours) at 12/17/2018 1438  Last data filed at 12/16/2018 1800  Gross per 24 hour   Intake 920 ml   Output --   Net 920 ml      Physical Exam   Constitutional: She is oriented to person, place, and time. She appears well-developed and well-nourished. No distress.   HENT:   Head: Normocephalic and atraumatic.   Eyes: Conjunctivae and EOM are normal. Pupils are equal, round, and reactive to light. Right eye exhibits no discharge. Left eye exhibits no discharge.   Neck: Neck supple. No JVD present.   Cardiovascular: Normal rate and regular rhythm. Exam reveals no gallop and no friction rub.   No murmur heard.  Pulmonary/Chest: Effort normal and breath sounds normal. No stridor. No respiratory distress. She has no wheezes. She has no rales. She exhibits no tenderness.   Abdominal: Soft. Bowel sounds are normal. She exhibits no distension and no mass. There is no tenderness. There is no rebound and no guarding. No hernia.   obese   Musculoskeletal: Normal range of motion. She exhibits no edema or deformity.   Neurological: She is alert and oriented to person, place, and time. A cranial nerve deficit is present.   Expressive aphasia    Skin: Skin is warm and dry. Capillary refill takes less than 2 seconds. She is not diaphoretic.   Psychiatric: She has a normal mood and affect. Her behavior is normal.   Nursing note and vitals reviewed.      Significant Labs:   CBC:   Recent Labs   Lab 12/15/18  1525 12/16/18  0735   WBC 10.54 6.49   HGB 14.2 12.4   HCT 44.0 39.3   * 276     CMP:   Recent Labs   Lab 12/15/18  1525 12/16/18  0735    140   K 3.1* 3.7    111*   CO2 23 22*   GLU 93 84   BUN 9 6   CREATININE 0.8 0.7   CALCIUM 9.4 8.3*   PROT 7.9  --    ALBUMIN 4.1  --    BILITOT 0.4  --    ALKPHOS 102  --    AST 28  --    ALT 16  --    ANIONGAP 11 7*   EGFRNONAA >60 >60     All pertinent labs within the past 24 hours have been reviewed.    Significant Imaging: I have  reviewed all pertinent imaging results/findings within the past 24 hours.    Assessment/Plan:      * Acute CVA (cerebrovascular accident)    Admit to inpatient   CT head reveals old embolic type infarcts.  Continue statin, aspirin.  MRI showed restricted diffusion in the MCA likely M3 territory peripheral left parietal region, as well as posterior aspect left insula, consistent with acute embolic infarcts.  Neurology, Dr. Ortega saw the patient  PT/OT evaluate and treat - no functional immobility noted  Speech Therapy - recs mechanical soft diet with thin liquids, therapy to continue (will need outpatient speech therapy as she has medicaid)  Check 2 D Echo - preserved ED, enlarged right atrium and indeterminate diastolic function - no patent foramen ovale  Bilateral carotid ultrasound - no stenosis  CTA of Head and Neck pending  Labs for hypercoagulability sent and pending           Acute encephalopathy    Etiology from acute CVA, benzo and amphetamine abuse  Symptoms have now improved  Patient extremely hysterical, crying, talking incomprehensibly.  CT head without acute changes.  Reveals old embolic infarcts.  MRI positive for acute CVA   Neurology consult.  UDS positive for benzodiazepines and amphetamines.  Neuro checks every 4 hr.       ARIE (generalized anxiety disorder)    Family stated that as outpatient her PCP has been trying to wean her off benzodiazepines.         COPD (chronic obstructive pulmonary disease)    Stable   Duo neb PRN wheezing or SOB  Supplemental O2 as needed        Tobacco abuse    History of tobacco abuse   Nicotine patch          VTE Risk Mitigation (From admission, onward)        Ordered     enoxaparin injection 40 mg  Daily      12/16/18 1452     IP VTE HIGH RISK PATIENT  Once      12/15/18 2113     Place sequential compression device  Until discontinued      12/15/18 2113              Susanna Oates NP  Department of Hospital Medicine   [facility]

## 2018-12-17 NOTE — PT/OT/SLP PROGRESS
Speech Language Pathology Treatment    Patient Name:  Valorie Monzon   MRN:  [id number]  Admitting Diagnosis: Acute CVA (cerebrovascular accident)    Recommendations:                 General Recommendations:  Speech/language therapy  Diet recommendations:  Mechanical soft, Liquid Diet Level: Thin   Aspiration Precautions: HOB to 90 degrees and Remain upright 30 minutes post meal   General Precautions: Standard, aphasia, fall    Subjective     Pt was seen sitting in a chair at bedside with no family present.  Patient goals: none stated     Pain/Comfort:  · Pain Rating 1: 0/10    Objective:     Has the patient been evaluated by SLP for swallowing?   Yes  Keep patient NPO? No   Current Respiratory Status: room air      Pt completed rote tasks with mod cueing, object ID with mod cueing, yes/no questions with 85% acc, reading tasks with min cueing, writing tasks with max cueing.  ST provided education on aphasia and apraxia as well as calming ex's.  Pt tearful and frustrated     Assessment:     Valorie Monzon is a 48 y.o. female with an SLP diagnosis of Aphasia and Apraxia.  She presents with word finding difficulties, perseverations, decreased reading/writing abilities, and decreased communication skills.    Goals:   Multidisciplinary Problems     SLP Goals        Problem: SLP Goal    Goal Priority Disciplines Outcome   SLP Goal     SLP Ongoing (interventions implemented as appropriate)   Description:  LT. Pt will tolerate least restrictive diet consistency safely and efficiently.  2. Pt will communicate needs/ideas effectively.    ST. BSA with full meal with further swallowing goals as appropriate.  2. Follow 2-3 step commands with 90%  3. Answer simple yes/no questions with 90%  4. Complete simple naming tasks with 80%  5. Complete WAB with goals as appropriate                    Plan:     · Patient to be seen:  5 x/week   · Plan of Care expires:     · Plan of Care reviewed with:  patient   · SLP  Follow-Up:  Yes       Discharge recommendations:  other (see comments)(Home Health Speech therapy)   Barriers to Discharge:  None    Time Tracking:     SLP Treatment Date:   12/17/18  Speech Start Time:  1100  Speech Stop Time:  1130     Speech Total Time (min):  30 min    Billable Minutes: Speech Therapy Individual 30    Gwendolyn Krishnan CCC-SLP  12/17/2018

## 2018-12-17 NOTE — ASSESSMENT & PLAN NOTE
Etiology from acute CVA, benzo and amphetamine abuse  Symptoms have now improved  Patient extremely hysterical, crying, talking incomprehensibly.  CT head without acute changes.  Reveals old embolic infarcts.  MRI positive for acute CVA   Neurology consult.  UDS positive for benzodiazepines and amphetamines.  Neuro checks every 4 hr.

## 2018-12-17 NOTE — PLAN OF CARE
Problem: Adult Inpatient Plan of Care  Goal: Plan of Care Review  Patient ambulated to the bathroom several times throughout the night. Education and plan of care provided and explained. All alarms are on and audible, will continue to monitor.

## 2018-12-18 VITALS
BODY MASS INDEX: 39.12 KG/M2 | RESPIRATION RATE: 18 BRPM | OXYGEN SATURATION: 97 % | WEIGHT: 243.38 LBS | HEIGHT: 66 IN | DIASTOLIC BLOOD PRESSURE: 89 MMHG | TEMPERATURE: 98 F | HEART RATE: 91 BPM | SYSTOLIC BLOOD PRESSURE: 140 MMHG

## 2018-12-18 PROBLEM — G93.40 ACUTE ENCEPHALOPATHY: Status: RESOLVED | Noted: 2018-12-15 | Resolved: 2018-12-18

## 2018-12-18 LAB
CARDIOLIPIN IGG SER IA-ACNC: <9.4 GPL
CARDIOLIPIN IGM SER IA-ACNC: <9.4 MPL

## 2018-12-18 PROCEDURE — 92507 TX SP LANG VOICE COMM INDIV: CPT

## 2018-12-18 PROCEDURE — 25000003 PHARM REV CODE 250: Performed by: INTERNAL MEDICINE

## 2018-12-18 PROCEDURE — 25000003 PHARM REV CODE 250: Performed by: NURSE PRACTITIONER

## 2018-12-18 PROCEDURE — S4991 NICOTINE PATCH NONLEGEND: HCPCS | Performed by: NURSE PRACTITIONER

## 2018-12-18 RX ORDER — ASPIRIN 325 MG
325 TABLET, DELAYED RELEASE (ENTERIC COATED) ORAL DAILY
Refills: 0 | COMMUNITY
Start: 2018-12-18 | End: 2019-12-18

## 2018-12-18 RX ADMIN — Medication 1 PATCH: at 09:12

## 2018-12-18 RX ADMIN — ATORVASTATIN CALCIUM 40 MG: 40 TABLET, FILM COATED ORAL at 09:12

## 2018-12-18 RX ADMIN — ASPIRIN 325 MG: 325 TABLET, DELAYED RELEASE ORAL at 09:12

## 2018-12-18 RX ADMIN — DULOXETINE 60 MG: 30 CAPSULE, DELAYED RELEASE ORAL at 09:12

## 2018-12-18 RX ADMIN — PANTOPRAZOLE SODIUM 40 MG: 40 TABLET, DELAYED RELEASE ORAL at 09:12

## 2018-12-18 RX ADMIN — ACETAMINOPHEN 650 MG: 325 TABLET ORAL at 03:12

## 2018-12-18 NOTE — DISCHARGE SUMMARY
[facility] Hospital Medicine  Discharge Summary      Patient Name: Valorie Monzon  MRN: [id number]  Admission Date: 12/15/2018  Hospital Length of Stay: 2 days  Discharge Date and Time:  12/18/2018 11:18 AM  Attending Physician: Norbert Henry, *   Discharging Provider: Susanna Oates NP  Primary Care Provider: Karri Lvey MD      HPI:   Ms. Monzon is a 48-year-old  female, who appears significantly older than her stated age, history of generalized anxiety disorder, COPD, TIAs in the past, was last known normal last night, and a starting early this morning she of was confused according to the family members.  She was hallucinating, talking to herself, disoriented, progressively getting worse hence brought to the ED.  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  Patient is extremely anxious and history equal.  Talking nonstop, but unable to comprehend anything.  Does not follow commands, but is moving all of bilateral upper and lower extremities spontaneously.  Patient admitted for acute encephalopathy of unclear etiology.  Family described that as outpatient her PCP has been trying to wean her off of benzodiazepines ) exam recs).  UDS positive for benzodiazepines and amphetamines (family denies any known drug use).    * No surgery found *      Hospital Course:   Ms Monzon is a 48 year old female who presented to [facility] Emergency Room with Altered Mental Status with difficult speaking. Pt and her son reported symptoms started after 3 am yesterday morning. CT of head showed no acute intracranial abnormality, however MRI Restricted diffusion in the MCA likely M3 territory peripheral left parietal region, as well as posterior aspect left insula, consistent with acute embolic infarcts. Neurology consult, reviewed MRI results with Dr Ortega. Dr. Ortega saw the patient  and agreed with the ASA and STATIN medications. MARANDA with bubble study noted severe left atrial enlargement, preserved LVSF and indeterminate LV diastolic function. No reports of patent foramen ovale.  Carotid US was negative for stenosis. CTA of Head and Neck finds no evidence of thromboembolism or vascular stenosis in the distribution of the left middle cerebral artery corresponding to the recent infarction.  2. There is a paucity of flow within the right middle cerebral artery distribution corresponding to an area of encephalomalacia involving the right frontal lobe which is an expected finding.  3. The carotid arteries are widely patent bilaterally.  4. No evidence of aneurysm. Labs to rule out hypercoagulable state are pending at time of discharge. Hypercoagulable W/U in 2016 was negative. PT/OT evaluated patient and she did not require their services as she was high functioning. Pt with global aphasia and Speech Therapy was initiated; pt determined to need mechanical soft diet with thin liquids. Pt arranged outpatient speech therapy at [facility]. Pt was cleared for discharge by Dr. Ortega, Neurology. She was see and examined and determined to be safe and stable for discharge. Pt was advised to control HTN, smoking cessation, weight loss and exercise. She was prescribed ASA and atorvastatin. Pt/son were advised to follow up with Hola Gillis PCP.      Consults:   Consults (From admission, onward)        Status Ordering Provider     Inpatient consult to Neurology  Once     Provider:  Fabian Lawler MD    Completed JUANY CORTEZ     Inpatient consult to Neurology  Once     Provider:  Debi Ortega MD    Completed LO BRUNSON     Inpatient consult to Social Work  Once     Provider:  (Not yet assigned)    Completed HAILEY CAREY          No new Assessment & Plan notes have been filed under this hospital service since the last note was generated.  Service: Hospital Medicine    Final Active  Diagnoses:    Diagnosis Date Noted POA    PRINCIPAL PROBLEM:  Acute CVA (cerebrovascular accident) [I63.9] 03/22/2018 Yes    ARIE (generalized anxiety disorder) [F41.1] 12/15/2018 Yes    COPD (chronic obstructive pulmonary disease) [J44.9] 04/04/2018 Yes    Tobacco abuse [Z72.0] 04/04/2018 Yes      Problems Resolved During this Admission:    Diagnosis Date Noted Date Resolved POA    Altered mental state [R41.82] 12/16/2018 12/17/2018 Yes    Acute encephalopathy [G93.40] 12/15/2018 12/18/2018 Yes       Discharged Condition: stable    Disposition: Home or Self Care    Follow Up:  Follow-up Information     Karri Levy MD In 3 days.    Specialty:  Internal Medicine  Why:  Hospital Follow Up - Amphetamine induced middle cerebral artery stroke. Needs to follow up on hypercoagulable labs still pending  Contact information:  [address] [address] [address]  [phone]                 Patient Instructions:      Notify your health care provider if you experience any of the following:  temperature >100.4     Notify your health care provider if you experience any of the following:  difficulty breathing or increased cough     Notify your health care provider if you experience any of the following:  persistent dizziness, light-headedness, or visual disturbances     Notify your health care provider if you experience any of the following:  increased confusion or weakness     Activity as tolerated       Significant Diagnostic Studies: Labs: CMP No results for input(s): NA, K, CL, CO2, GLU, BUN, CREATININE, CALCIUM, PROT, ALBUMIN, BILITOT, ALKPHOS, AST, ALT, ANIONGAP, ESTGFRAFRICA, EGFRNONAA in the last 48 hours. and CBC No results for input(s): WBC, HGB, HCT, PLT in the last 48 hours.    Pending Diagnostic Studies:     Procedure Component Value Units Date/Time    Antithrombin III [[id number]] Collected:  12/17/18 1147    Order Status:  Sent Lab Status:  In process Updated:  12/17/18  1201    Specimen:  Blood     Cardiolipin antibody [[id number]] Collected:  12/17/18 1147    Order Status:  Sent Lab Status:  In process Updated:  12/17/18 2055    Specimen:  Blood     Cardiolipin antibody, IgA [[id number]] Collected:  12/17/18 1147    Order Status:  Sent Lab Status:  In process Updated:  12/18/18 0137    Specimen:  Blood     DRVVT [[id number]] Collected:  12/17/18 1147    Order Status:  Sent Lab Status:  In process Updated:  12/18/18 0054    Specimen:  Blood     Factor 5 leiden [[id number]] Collected:  12/17/18 1147    Order Status:  Sent Lab Status:  In process Updated:  12/17/18 2029    Specimen:  Blood     Lipoprotein A (LPA) [[id number]] Collected:  12/17/18 1147    Order Status:  Sent Lab Status:  In process Updated:  12/18/18 0054    Specimen:  Blood     Lupus anticoagulant [[id number]]     Order Status:  Sent Lab Status:  No result     Specimen:  Blood     Protein C activity [[id number]] Collected:  12/17/18 1147    Order Status:  Sent Lab Status:  In process Updated:  12/18/18 0054    Specimen:  Blood     Protein S activity [[id number]] Collected:  12/17/18 1147    Order Status:  Sent Lab Status:  In process Updated:  12/18/18 0054    Specimen:  Blood     Prothrombin gene mutation [[id number]] Collected:  12/17/18 1147    Order Status:  Sent Lab Status:  In process Updated:  12/17/18 2029    Specimen:  Blood          Medications:  Reconciled Home Medications:      Medication List      CHANGE how you take these medications    furosemide 20 MG tablet  Commonly known as:  LASIX  Take 1 tablet (20 mg total) by mouth once daily.  What changed:  when to take this        CONTINUE taking these medications    albuterol 90 mcg/actuation inhaler  Commonly known as:  PROVENTIL/VENTOLIN HFA  Inhale 2 puffs into the lungs every 4 (four) hours as needed for Wheezing.     ALPRAZolam 0.25 MG tablet  Commonly known as:  XANAX  Take 1 tablet (0.25 mg total) by mouth 3 (three) times daily as needed for Anxiety.      aspirin 325 MG EC tablet  Commonly known as:  ECOTRIN  Take 1 tablet (325 mg total) by mouth once daily.     atorvastatin 40 MG tablet  Commonly known as:  LIPITOR  TAKE 1 TABLET BY MOUTH NIGHTLY AVOID GRAPEFRUIT     CALCIUM ANTACID 300 mg (750 mg) Chew  Generic drug:  calcium carbonate  Take by mouth 2 (two) times daily as needed.     carvedilol 12.5 MG tablet  Commonly known as:  COREG  Take 12.5 mg by mouth 2 (two) times daily.     DULoxetine 60 MG capsule  Commonly known as:  CYMBALTA  Take 60 mg by mouth once daily.     fluticasone-vilanterol 100-25 mcg/dose diskus inhaler  Commonly known as:  BREO ELLIPTA  1 puff daily     gabapentin 600 MG tablet  Commonly known as:  NEURONTIN  Take 600 mg by mouth 2 (two) times daily.     HYDROcodone-acetaminophen 5-325 mg per tablet  Commonly known as:  NORCO  Take 1 tablet by mouth every 6 (six) hours as needed for Pain.     neomycin-polymyxin-pramoxine 3.5-10,000-10 mg-unit-mg/gram Crea  Commonly known as:  NEOSPORIN  Apply topically to right lower extremity wound daily     nicotine 21-14-7 mg/24 hr Ptds     pantoprazole 40 MG tablet  Commonly known as:  PROTONIX  Take 40 mg by mouth once daily.     potassium chloride SA 10 MEQ tablet  Commonly known as:  K-DUR,KLOR-CON  Take 1 tablet (10 mEq total) by mouth once daily.        STOP taking these medications    sucralfate 100 mg/mL suspension  Commonly known as:  CARAFATE            Indwelling Lines/Drains at time of discharge:   Lines/Drains/Airways          None          Time spent on the discharge of patient: > 30 minutes  Patient was seen and examined on the date of discharge and determined to be suitable for discharge.         Susanna Oates NP  Department of Hospital Medicine  [facility]

## 2018-12-18 NOTE — NURSING
Patient discharged per MD order. Discharge instructions and handout given to patient. Stressed the importance of making and keeping all follow up appointments. Patient verbalizes understanding and has no questions at this time. IVs discontinued, telemetry removed and returned to monitor tech. Patient awaiting transportation home with family.

## 2018-12-18 NOTE — PT/OT/SLP PROGRESS
Speech Language Pathology Treatment    Patient Name:  Valorie Monzon   MRN:  [id number]  Admitting Diagnosis: Acute CVA (cerebrovascular accident)    Recommendations:                 General Recommendations:  Speech/language therapy  Diet recommendations:  Mechanical soft, Liquid Diet Level: Thin   Aspiration Precautions: 1 bite/sip at a time and HOB to 90 degrees   General Precautions: Standard, aphasia  Communication strategies:  Allow extra time    Subjective     Pt cooperative and presents with word finding problems. Pt is very motivated.  Patient goals: to speak better    Pain/Comfort:  · Pain Rating 1: 0/10  · Pain Rating Post-Intervention 1: 0/10    Objective:     Has the patient been evaluated by SLP for swallowing?   Yes  Keep patient NPO? No   Current Respiratory Status: room air      Pt answered simple y/n questions with 95% acc. She named objects with 75% acc. Pt had difficulty sequencing 2-3 syllable words. She was able to answer personal orientation questions with extra time.    Assessment:     Valorie Monzon is a 48 y.o. female with an SLP diagnosis of Aphasia and Apraxia.  She presents with deficits in word finding and motor sequencing.    Goals:   Multidisciplinary Problems     SLP Goals        Problem: SLP Goal    Goal Priority Disciplines Outcome   SLP Goal     SLP Ongoing (interventions implemented as appropriate)   Description:  LT. Pt will tolerate least restrictive diet consistency safely and efficiently.  2. Pt will communicate needs/ideas effectively.    ST. BSA with full meal with further swallowing goals as appropriate.  2. Follow 2-3 step commands with 90%  3. Answer simple yes/no questions with 90%  4. Complete simple naming tasks with 80%  5. Complete WAB with goals as appropriate                     Plan:     · Patient to be seen:  5 x/week   · Plan of Care expires:     · Plan of Care reviewed with:  patient   · SLP Follow-Up:  Yes       Discharge recommendations:   other (see comments)(Home Health Speech therapy)   Barriers to Discharge:  None    Time Tracking:     SLP Treatment Date:   12/18/18  Speech Start Time:  0945  Speech Stop Time:  1002     Speech Total Time (min):  17 min    Billable Minutes: Speech Therapy Individual 17    Aida Obrien CCC-SLP  12/18/2018

## 2018-12-18 NOTE — PLAN OF CARE
Problem: Adult Inpatient Plan of Care  Goal: Plan of Care Review  Outcome: Ongoing (interventions implemented as appropriate)  Patient AAOx3. At times confused about situation  Vital signs stable, afebrile, SR/St on tele monitor  Aphasia making it difficult for patient to fully explain any problems she may be having  Denies SOB and CP  Able to ambulate to the restroom with no problems, able to turn herself in bed  Explained the plan of care with patient

## 2018-12-18 NOTE — PLAN OF CARE
Problem: SLP Goal  Goal: SLP Goal  LT. Pt will tolerate least restrictive diet consistency safely and efficiently.  2. Pt will communicate needs/ideas effectively.    ST. BSA with full meal with further swallowing goals as appropriate.  2. Follow 2-3 step commands with 90%  3. Answer simple yes/no questions with 90%  4. Complete simple naming tasks with 80%  5. Complete WAB with goals as appropriate   Outcome: Ongoing (interventions implemented as appropriate)  Pt producing phrases and sentences with limited successful communication.

## 2018-12-19 DIAGNOSIS — J44.9 COPD (CHRONIC OBSTRUCTIVE PULMONARY DISEASE): Primary | ICD-10-CM

## 2018-12-19 LAB
CARDIOLIPIN IGA SER IA-ACNC: <9 APL
F2 GENE MUT ANL BLD/T: NORMAL
F5 P.R506Q BLD/T QL: NORMAL

## 2018-12-20 LAB
APTT PROTEIN C ACTIVATOR+FV DP/APTT PPP: 104 %
AT III ACT/NOR PPP CHRO: 106 %
PROT S ACT/NOR PPP: 99 %

## 2018-12-21 LAB
APTT HEX PL PPP: POSITIVE S
LPA SERPL-MCNC: 5 MG/DL (ref 0–30)

## 2018-12-24 LAB — LA PPP-IMP: NORMAL

## 2018-12-26 ENCOUNTER — PATIENT MESSAGE (OUTPATIENT)
Dept: NEUROLOGY | Facility: CLINIC | Age: 48
End: 2018-12-26

## 2018-12-26 NOTE — PLAN OF CARE
12/26/18 1507   Final Note   Assessment Type Final Discharge Note   Anticipated Discharge Disposition Home   Right Care Referral Info   Post Acute Recommendation No Care

## 2019-01-07 ENCOUNTER — TELEPHONE (OUTPATIENT)
Dept: RHEUMATOLOGY | Facility: CLINIC | Age: 49
End: 2019-01-07

## 2019-01-07 NOTE — TELEPHONE ENCOUNTER
Attempted to contact Mrs. Monzon regarding her missed appointment with Dr. Solano which was scheduled for today at 9am. Unable to leave voice message.

## 2019-06-13 ENCOUNTER — HOSPITAL ENCOUNTER (EMERGENCY)
Facility: HOSPITAL | Age: 49
Discharge: HOME | End: 2019-06-13
Attending: SURGERY
Payer: MEDICAID

## 2019-06-13 VITALS
OXYGEN SATURATION: 96 % | SYSTOLIC BLOOD PRESSURE: 113 MMHG | HEIGHT: 64 IN | TEMPERATURE: 98 F | HEART RATE: 71 BPM | WEIGHT: 226.44 LBS | RESPIRATION RATE: 20 BRPM | BODY MASS INDEX: 38.66 KG/M2 | DIASTOLIC BLOOD PRESSURE: 71 MMHG

## 2019-06-13 DIAGNOSIS — R60.0 BILATERAL EDEMA OF LOWER EXTREMITY: Primary | ICD-10-CM

## 2019-06-13 DIAGNOSIS — R60.0 FLUID RETENTION IN LEGS: ICD-10-CM
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
## 2019-06-13 DIAGNOSIS — R60.9 EDEMA: ICD-10-CM

## 2019-06-13 PROCEDURE — 25000003 PHARM REV CODE 250: Mod: ER | Performed by: SURGERY

## 2019-06-13 PROCEDURE — 99284 EMERGENCY DEPT VISIT MOD MDM: CPT | Mod: ER

## 2019-06-13 RX ORDER — HYDROCODONE BITARTRATE AND ACETAMINOPHEN 10; 325 MG/1; MG/1
1 TABLET ORAL
Status: COMPLETED | OUTPATIENT
Start: 2019-06-13 | End: 2019-06-13

## 2019-06-13 RX ORDER — TRIAMCINOLONE ACETONIDE 1 MG/G
CREAM TOPICAL 2 TIMES DAILY
Qty: 45 G | Refills: 0 | Status: SHIPPED | OUTPATIENT
Start: 2019-06-13

## 2019-06-13 RX ORDER — BUTALBITAL, ACETAMINOPHEN, CAFFEINE AND CODEINE PHOSPHATE 300; 50; 40; 30 MG/1; MG/1; MG/1; MG/1
1 CAPSULE ORAL EVERY 4 HOURS
Qty: 15 CAPSULE | Refills: 0 | Status: SHIPPED | OUTPATIENT
Start: 2019-06-13

## 2019-06-13 RX ORDER — FUROSEMIDE 40 MG/1
40 TABLET ORAL DAILY
Qty: 30 TABLET | Refills: 0 | Status: SHIPPED | OUTPATIENT
Start: 2019-06-13 | End: 2020-06-12

## 2019-06-13 RX ADMIN — HYDROCODONE BITARTRATE AND ACETAMINOPHEN 1 TABLET: 10; 325 TABLET ORAL at 03:06

## 2019-06-13 NOTE — ED PROVIDER NOTES
"Encounter Date: 2019       History     Chief Complaint   Patient presents with    Foot Swelling     "I have swelling to both my feet since last  and the bottoms of the hurt" denies trauma     Chronic venous stasis and bilateral dependent leg edema which is chronic complains of tightness in both feet where she can hardly walk without pain she has suffered a previous CVA and has trouble speaking she denies any soft tissue injury.  She was on Lasix but has been discontinued    The history is provided by the patient.   Leg Pain    The incident occurred at home. The incident occurred several weeks ago. The pain location is generalized. The quality of the pain is described as aching. The pain is at a severity of 5/10. The pain has been intermittent since onset. Pertinent negatives include no inability to bear weight. She reports no foreign bodies present. Nothing aggravates the symptoms. She has tried immobilization for the symptoms. The treatment provided no relief.     Review of patient's allergies indicates:   Allergen Reactions    Lorazepam Other (See Comments)     Causes hallucination     Past Medical History:   Diagnosis Date    COPD (chronic obstructive pulmonary disease)     Depression     GERD (gastroesophageal reflux disease)     Hypercholesteremia     Hypertension     Kidney stone     Stroke     Tobacco use      Past Surgical History:   Procedure Laterality Date    HYSTERECTOMY      KNEE ARTHROSCOPY W/ ACL RECONSTRUCTION Left     LITHOTRIPSY       Family History   Problem Relation Age of Onset    Kidney disease Mother     Cancer Mother     Liver disease Mother     Breast cancer Mother      Social History     Tobacco Use    Smoking status: Former Smoker     Packs/day: 1.00     Types: Cigarettes     Last attempt to quit: 2018     Years since quittin.3    Smokeless tobacco: Never Used    Tobacco comment: patches    Substance Use Topics    Alcohol use: No    Drug use: No "     Review of Systems   Constitutional: Negative.    HENT: Negative.    Eyes: Negative.    Respiratory: Negative.    Cardiovascular: Positive for leg swelling.   Gastrointestinal: Negative.    Endocrine: Negative.    Genitourinary: Negative.    Musculoskeletal: Positive for arthralgias.   Skin: Negative.    Allergic/Immunologic: Negative.    Neurological: Negative.    Hematological: Negative.    Psychiatric/Behavioral: Negative.        Physical Exam     Initial Vitals [06/13/19 1220]   BP Pulse Resp Temp SpO2   116/80 80 16 98.5 °F (36.9 °C) 98 %      MAP       --         Physical Exam    Nursing note and vitals reviewed.  Constitutional: She appears well-developed and well-nourished.   HENT:   Head: Normocephalic.   Eyes: Conjunctivae are normal.   Neck: Normal range of motion.   Cardiovascular: Normal rate.   Pulmonary/Chest: Breath sounds normal.   Abdominal: Soft.   Musculoskeletal: She exhibits tenderness.        Legs:  Neurological: She is alert and oriented to person, place, and time. GCS score is 15. GCS eye subscore is 4. GCS verbal subscore is 5. GCS motor subscore is 6.   Psychiatric: She has a normal mood and affect.         ED Course   Procedures  Labs Reviewed - No data to display       Imaging Results          X-Ray Foot Complete Bilateral (Final result)  Result time 06/13/19 13:54:18   Procedure changed from X-Ray Foot Complete Right     Final result by Kwabena Salvador MD (06/13/19 13:54:18)                 Impression:      No acute fracture or dislocation.      Electronically signed by: Kwabena Salvador MD  Date:    06/13/2019  Time:    13:54             Narrative:    EXAMINATION:  XR FOOT COMPLETE 3 VIEW BILATERAL    CLINICAL HISTORY:  XR FOOT COMPLETE 3 VIEW BILATERALEdema, unspecified    COMPARISON:  None    FINDINGS:  Three views of the bilateral feet were obtained.    No evidence of acute fracture or dislocation.  Bony mineralization is normal.  Mild diffuse soft tissue edema bilaterally.     Plantar calcaneal spur and moderate hallux valgus bilaterally.                                 Medical Decision Making:   Initial Assessment:   Chronic venous insufficiency with dependency edema  ED Management:  Recommend elevations compression stockings                      Clinical Impression:       ICD-10-CM ICD-9-CM   1. Bilateral edema of lower extremity R60.0 782.3   2. Edema R60.9 782.3   3. Fluid retention in legs R60.0 782.3   4. Insect bite, initial encounter W57.XXXA 919.4     E906.4         Disposition:   Disposition: Discharged  Condition: Stable                        EDD Martinez III, MD  06/13/19 1619

## 2019-06-13 NOTE — ED NOTES
Called Pt , Pt not in lobby, Pt not in restroom, informed Charge nurse, informed Registration to phone if pt returns

## 2019-06-13 NOTE — ED NOTES
"Pt states she has had a prior CVA and "I talk funny and my memory comes and goes when it wants to". Pt AAOx3. Pt poor historian for medical history and home meds. "I need you  to call my counselor because I don't know my medicines".  Informed pt to write her meds down and keep a list in her purse, pt states "Oh well I can't write". Informed pt to have friend or family write the list for her to keep with her at all times. Pt states "yeah, ok"  "

## 2020-01-27 ENCOUNTER — OFFICE VISIT (OUTPATIENT)
Dept: URGENT CARE | Facility: CLINIC | Age: 50
End: 2020-01-27
Payer: MEDICAID

## 2020-01-27 VITALS
HEIGHT: 64 IN | WEIGHT: 226 LBS | BODY MASS INDEX: 38.58 KG/M2 | RESPIRATION RATE: 18 BRPM | SYSTOLIC BLOOD PRESSURE: 109 MMHG | HEART RATE: 88 BPM | DIASTOLIC BLOOD PRESSURE: 7 MMHG | OXYGEN SATURATION: 95 % | TEMPERATURE: 99 F

## 2020-01-27 DIAGNOSIS — L03.90 CELLULITIS, UNSPECIFIED CELLULITIS SITE: Primary | ICD-10-CM

## 2020-01-27 DIAGNOSIS — L29.9 ITCHING: ICD-10-CM

## 2020-01-27 DIAGNOSIS — R60.0 LOWER LEG EDEMA: ICD-10-CM

## 2020-01-27 DIAGNOSIS — I87.2 VENOUS STASIS DERMATITIS OF BOTH LOWER EXTREMITIES: ICD-10-CM

## 2020-01-27 PROCEDURE — 99214 OFFICE O/P EST MOD 30 MIN: CPT | Mod: S$GLB,,, | Performed by: PHYSICIAN ASSISTANT

## 2020-01-27 PROCEDURE — 99214 PR OFFICE/OUTPT VISIT, EST, LEVL IV, 30-39 MIN: ICD-10-PCS | Mod: S$GLB,,, | Performed by: PHYSICIAN ASSISTANT

## 2020-01-27 RX ORDER — DIPHENHYDRAMINE HCL 25 MG
25 CAPSULE ORAL EVERY 8 HOURS PRN
Qty: 15 CAPSULE | Refills: 0 | Status: SHIPPED | OUTPATIENT
Start: 2020-01-27 | End: 2020-02-01

## 2020-01-27 RX ORDER — FUROSEMIDE 20 MG/1
20 TABLET ORAL NIGHTLY
Qty: 3 TABLET | Refills: 0 | Status: SHIPPED | OUTPATIENT
Start: 2020-01-27 | End: 2020-01-30

## 2020-01-27 RX ORDER — SULFAMETHOXAZOLE AND TRIMETHOPRIM 800; 160 MG/1; MG/1
1 TABLET ORAL 2 TIMES DAILY
Qty: 10 TABLET | Refills: 0 | Status: SHIPPED | OUTPATIENT
Start: 2020-01-27 | End: 2020-02-01

## 2020-01-27 RX ORDER — MUPIROCIN 20 MG/G
OINTMENT TOPICAL
Qty: 22 G | Refills: 1 | Status: SHIPPED | OUTPATIENT
Start: 2020-01-27

## 2020-01-28 NOTE — PATIENT INSTRUCTIONS
Leg Swelling in Both Legs    Swelling of the feet, ankles, and legs is called edema. It is caused by excess fluid that has collected in the tissues. Extra fluid in the body settles in the lowest part because of gravity. This is why the legs and feet are most affected.  Some of the causes for edema include:  · Disease of the heart like congestive heart failure  · Standing or sitting for long periods of time  · Infection of the feet or legs  · Blood pooling in the veins of your legs (venous insufficiency)  · Dilated veins in your lower leg (varicose veins)  · Garters or other clothing that is tight on your legs. This will cause blood to pool in your legs because the clothing limits blood flow.  · Some medicines such as hormones like birth control pills, some blood pressure medicines like calcium channel blockers (amlodipine) and steroids, some antidepressants like MAO inhibitors and tricyclics  · Menstrual periods that cause you to retain fluids  · Many types of renal disease  · Liver failure or cirrhosis  · Pregnancy, some swelling is normal, but a sudden increase in leg swelling or weight gain can be a sign of a dangerous complication of pregnancy  · Poor nutrition  · Thyroid disease  Medical treatment will depend on what is causing the swelling in your legs. Your healthcare provider may prescribe water pills (diuretics) to get rid of the extra fluid.  Home care  Follow these guidelines when caring for yourself at home:  · Don't wear clothing like garters that is tight on your legs.  · Keep your legs up while lying or sitting.  · If infection, injury, or recent surgery is causing the swelling, stay off your legs as much as possible until symptoms get better.  · If your healthcare provider says that your leg swelling is caused by venous insufficiency or varicose veins, don't sit or  one place for long periods of time. Take breaks and walk about every few hours. Brisk walking is a good exercise. It helps  circulate the blood that has collected in your leg. Talk with your provider about using support stockings to stop daytime leg swelling.  · If your provider says that heart disease is causing your leg swelling, follow a low-salt diet to stop extra fluid from staying in your body. You may also need medicine.  Follow-up care  Follow up with your healthcare provider, or as advised.  When to seek medical advice  Call your healthcare provider right away if any of these occur:  · New shortness of breath or chest pain  · Shortness of breath or chest pain that gets worse  · Swelling in both legs or ankles that gets worse  · Swelling of the abdomen  · Redness, warmth, or swelling in one leg  · Fever of 100.4ºF (38ºC) or higher, or as directed by your healthcare provider  · Yellow color to your skin or eyes  · Rapid, unexplained weight gain  · Having to sleep upright or use an increased number of pillows  Date Last Reviewed: 3/31/2016  © 3544-4183 [employer]. [address] [place] [address], [place] [address]. All rights reserved. This information is not intended as a substitute for professional medical care. Always follow your healthcare professional's instructions.      Please follow up with your Primary care provider within 2-5 days if your signs and symptoms have not resolved or worsen.     If your condition worsens or fails to improve we recommend that you receive another evaluation at the emergency room immediately or contact your primary medical clinic to discuss your concerns.   You must understand that you have received an Urgent Care treatment only and that you may be released before all of your medical problems are known or treated. You, the patient, will arrange for follow up care as instructed.     RED FLAGS/WARNING SYMPTOMS DISCUSSED WITH PATIENT THAT WOULD WARRANT EMERGENT MEDICAL ATTENTION. PATIENT VERBALIZED UNDERSTANDING.

## 2020-01-28 NOTE — PROGRESS NOTES
"Subjective:       Patient ID: Valorie Monzon is a 49 y.o. female.    Vitals:  height is 5' 4" (1.626 m) and weight is 102.5 kg (226 lb). Her temperature is 99 °F (37.2 °C). Her blood pressure is 109/7 (abnormal) and her pulse is 88. Her respiration is 18 and oxygen saturation is 95%.     Chief Complaint: Rash    Pt presents today for rash and leg swelling. Pt has multiple medical issues, including history of CVA w/dysarthria, polysubstance abuse, depression, anxiety, COPD, mitral valve stenosis, venous stasis, and hypertension. She was also recently hospitalized earlier this month for pneumonia. Pt states that she started to notice rash on her arms and legs about 1 week ago. Pt states last time she had similar rash it was cellulitis. Pt states her legs are usually swollen, and she takes Lasix 40 mg daily for edema. Pt says this causes a lot of pain to her lower legs. Rash on lower legs is painful and "burning". Rash on arms and face is itchy in nature. Pt says she established with a PCP, cardiologist, and pulmonologist. She denies numbness/tingling, weakness, fever/chills, chest pain, sob. She denies any illicit drug use.     Rash   This is a new problem. The current episode started in the past 7 days (7 days ). The problem has been gradually worsening since onset. Location: whole body  The rash is characterized by redness, pain, swelling, itchiness and bruising. She was exposed to a new medication. Pertinent negatives include no cough, fever or sore throat. Past treatments include nothing. Her past medical history is significant for asthma.       Constitution: Negative for chills and fever.   HENT: Negative for facial swelling and sore throat.    Neck: Negative for painful lymph nodes.   Eyes: Negative for eye itching and eyelid swelling.   Respiratory: Positive for asthma. Negative for cough.    Musculoskeletal: Negative for joint pain and joint swelling.   Skin: Positive for color change, rash, erythema, " bruising and hives. Negative for pale, wound, abrasion, laceration, lesion, skin thickening/induration, puncture wound, abscess and avulsion.   Allergic/Immunologic: Positive for asthma, hives and itching. Negative for environmental allergies, seasonal allergies and immunocompromised state.   Hematologic/Lymphatic: Negative for swollen lymph nodes.       Objective:      Physical Exam   Constitutional: She is oriented to person, place, and time. She appears well-developed and well-nourished.   HENT:   Head: Normocephalic and atraumatic. Head is without abrasion, without contusion and without laceration.       Right Ear: External ear normal.   Left Ear: External ear normal.   Nose: Nose normal.   Mouth/Throat: Oropharynx is clear and moist and mucous membranes are normal.   Erythema to nose and bilateral cheeks. No wound or lesions.    Eyes: Pupils are equal, round, and reactive to light. Conjunctivae, EOM and lids are normal.   Neck: Trachea normal, full passive range of motion without pain and phonation normal. Neck supple.   Cardiovascular: Normal rate, regular rhythm, normal heart sounds, intact distal pulses and normal pulses.   Pulmonary/Chest: Effort normal and breath sounds normal. No stridor. No respiratory distress.   Musculoskeletal: Normal range of motion.   Neurological: She is alert and oriented to person, place, and time.   Skin: Skin is warm, dry, intact and no rash. Capillary refill takes less than 2 seconds.   Several small scabbed, raised lesions on bilateral forearms. No surrounding erythema.     Several small wounds on bilateral shins. Mild ttp and edema throughout lower leg and feet. Distal pulses intact. Sensation intact. No drainage or surrounding erythema.  Lesions:  erythemaabrasion, burn, bruising and ecchymosis  Psychiatric: She has a normal mood and affect. Her speech is normal and behavior is normal. Judgment and thought content normal. Cognition and memory are normal.   Nursing note and  vitals reviewed.                        Assessment:       1. Cellulitis, unspecified cellulitis site    2. Venous stasis dermatitis of both lower extremities    3. Lower leg edema    4. Itching        Plan:         Pt instructed to take 20 mg lasix nightly x 3 nights for BLE. Based on pt's HPI and chart review, seems edema is chronic issue. She needs to make f/u appointment with her PCP if further medication adjustments are needed. Continue to elevate feet throughout the day. Concern for drug use given markings on pt's arm. Will cover for cellulitis at today's visit given history and pain. Pt requesting pain medication. Explained to pt that narcotic pain medications are not given in urgent care setting. ER precautions regarding her condition was discussed with the pt and her partner and both voiced understanding. Instructed not to take Benadryl in conjunction with valium or xanax.     Cellulitis, unspecified cellulitis site  -     mupirocin (BACTROBAN) 2 % ointment; Apply to affected area 3 times daily  Dispense: 22 g; Refill: 1  -     sulfamethoxazole-trimethoprim 800-160mg (BACTRIM DS) 800-160 mg Tab; Take 1 tablet by mouth 2 (two) times daily. for 5 days  Dispense: 10 tablet; Refill: 0    Venous stasis dermatitis of both lower extremities  -     furosemide (LASIX) 20 MG tablet; Take 1 tablet (20 mg total) by mouth every evening. for 3 days  Dispense: 3 tablet; Refill: 0    Lower leg edema  -     furosemide (LASIX) 20 MG tablet; Take 1 tablet (20 mg total) by mouth every evening. for 3 days  Dispense: 3 tablet; Refill: 0    Itching  -     diphenhydrAMINE (BENADRYL) 25 mg capsule; Take 1 capsule (25 mg total) by mouth every 8 (eight) hours as needed for Itching.  Dispense: 15 capsule; Refill: 0      Patient Instructions     Leg Swelling in Both Legs    Swelling of the feet, ankles, and legs is called edema. It is caused by excess fluid that has collected in the tissues. Extra fluid in the body settles in the lowest  part because of gravity. This is why the legs and feet are most affected.  Some of the causes for edema include:  · Disease of the heart like congestive heart failure  · Standing or sitting for long periods of time  · Infection of the feet or legs  · Blood pooling in the veins of your legs (venous insufficiency)  · Dilated veins in your lower leg (varicose veins)  · Garters or other clothing that is tight on your legs. This will cause blood to pool in your legs because the clothing limits blood flow.  · Some medicines such as hormones like birth control pills, some blood pressure medicines like calcium channel blockers (amlodipine) and steroids, some antidepressants like MAO inhibitors and tricyclics  · Menstrual periods that cause you to retain fluids  · Many types of renal disease  · Liver failure or cirrhosis  · Pregnancy, some swelling is normal, but a sudden increase in leg swelling or weight gain can be a sign of a dangerous complication of pregnancy  · Poor nutrition  · Thyroid disease  Medical treatment will depend on what is causing the swelling in your legs. Your healthcare provider may prescribe water pills (diuretics) to get rid of the extra fluid.  Home care  Follow these guidelines when caring for yourself at home:  · Don't wear clothing like garters that is tight on your legs.  · Keep your legs up while lying or sitting.  · If infection, injury, or recent surgery is causing the swelling, stay off your legs as much as possible until symptoms get better.  · If your healthcare provider says that your leg swelling is caused by venous insufficiency or varicose veins, don't sit or  one place for long periods of time. Take breaks and walk about every few hours. Brisk walking is a good exercise. It helps circulate the blood that has collected in your leg. Talk with your provider about using support stockings to stop daytime leg swelling.  · If your provider says that heart disease is causing your leg  swelling, follow a low-salt diet to stop extra fluid from staying in your body. You may also need medicine.  Follow-up care  Follow up with your healthcare provider, or as advised.  When to seek medical advice  Call your healthcare provider right away if any of these occur:  · New shortness of breath or chest pain  · Shortness of breath or chest pain that gets worse  · Swelling in both legs or ankles that gets worse  · Swelling of the abdomen  · Redness, warmth, or swelling in one leg  · Fever of 100.4ºF (38ºC) or higher, or as directed by your healthcare provider  · Yellow color to your skin or eyes  · Rapid, unexplained weight gain  · Having to sleep upright or use an increased number of pillows  Date Last Reviewed: 3/31/2016  © 4812-6323 [employer]. [address] [place] [address], [place] [address]. All rights reserved. This information is not intended as a substitute for professional medical care. Always follow your healthcare professional's instructions.      Please follow up with your Primary care provider within 2-5 days if your signs and symptoms have not resolved or worsen.     If your condition worsens or fails to improve we recommend that you receive another evaluation at the emergency room immediately or contact your primary medical clinic to discuss your concerns.   You must understand that you have received an Urgent Care treatment only and that you may be released before all of your medical problems are known or treated. You, the patient, will arrange for follow up care as instructed.     RED FLAGS/WARNING SYMPTOMS DISCUSSED WITH PATIENT THAT WOULD WARRANT EMERGENT MEDICAL ATTENTION. PATIENT VERBALIZED UNDERSTANDING.

## 2020-01-29 ENCOUNTER — TELEPHONE (OUTPATIENT)
Dept: URGENT CARE | Facility: CLINIC | Age: 50
End: 2020-01-29

## 2020-03-06 ENCOUNTER — OFFICE VISIT (OUTPATIENT)
Dept: URGENT CARE | Facility: CLINIC | Age: 50
End: 2020-03-06
Payer: MEDICAID

## 2020-03-06 ENCOUNTER — HOSPITAL ENCOUNTER (OUTPATIENT)
Dept: RADIOLOGY | Facility: CLINIC | Age: 50
Discharge: HOME | End: 2020-03-06
Attending: PHYSICIAN ASSISTANT
Payer: MEDICAID

## 2020-03-06 VITALS
TEMPERATURE: 99 F | DIASTOLIC BLOOD PRESSURE: 82 MMHG | HEART RATE: 75 BPM | WEIGHT: 274.13 LBS | BODY MASS INDEX: 47.06 KG/M2 | SYSTOLIC BLOOD PRESSURE: 114 MMHG | OXYGEN SATURATION: 95 %

## 2020-03-06 DIAGNOSIS — M54.50 ACUTE LEFT-SIDED LOW BACK PAIN WITHOUT SCIATICA: ICD-10-CM

## 2020-03-06 DIAGNOSIS — M25.552 LEFT HIP PAIN: ICD-10-CM

## 2020-03-06 DIAGNOSIS — M25.552 LEFT HIP PAIN: Primary | ICD-10-CM

## 2020-03-06 PROCEDURE — 72100 XR LUMBAR SPINE AP AND LATERAL: ICD-10-PCS | Mod: S$GLB,,, | Performed by: RADIOLOGY

## 2020-03-06 PROCEDURE — 73502 XR HIP 2 VIEW LEFT: ICD-10-PCS | Mod: LT,S$GLB,, | Performed by: RADIOLOGY

## 2020-03-06 PROCEDURE — 99214 PR OFFICE/OUTPT VISIT, EST, LEVL IV, 30-39 MIN: ICD-10-PCS | Mod: S$GLB,,, | Performed by: PHYSICIAN ASSISTANT

## 2020-03-06 PROCEDURE — 73502 X-RAY EXAM HIP UNI 2-3 VIEWS: CPT | Mod: LT,S$GLB,, | Performed by: RADIOLOGY

## 2020-03-06 PROCEDURE — 99214 OFFICE O/P EST MOD 30 MIN: CPT | Mod: S$GLB,,, | Performed by: PHYSICIAN ASSISTANT

## 2020-03-06 PROCEDURE — 72100 X-RAY EXAM L-S SPINE 2/3 VWS: CPT | Mod: S$GLB,,, | Performed by: RADIOLOGY

## 2020-03-06 RX ORDER — METHYLPREDNISOLONE 4 MG/1
TABLET ORAL
Qty: 1 PACKAGE | Refills: 0 | Status: SHIPPED | OUTPATIENT
Start: 2020-03-06

## 2020-03-06 RX ORDER — KETOROLAC TROMETHAMINE 30 MG/ML
30 INJECTION, SOLUTION INTRAMUSCULAR; INTRAVENOUS
Status: COMPLETED | OUTPATIENT
Start: 2020-03-06 | End: 2020-03-06

## 2020-03-06 RX ADMIN — KETOROLAC TROMETHAMINE 30 MG: 30 INJECTION, SOLUTION INTRAMUSCULAR; INTRAVENOUS at 06:03

## 2020-03-06 NOTE — PROGRESS NOTES
"Subjective:       Patient ID: Valorie Monzon is a 49 y.o. female.    Vitals:  weight is 124.3 kg (274 lb 2.3 oz). Her tympanic temperature is 99.1 °F (37.3 °C). Her blood pressure is 114/82 and her pulse is 75. Her oxygen saturation is 95%.     Chief Complaint: Leg Pain (left leg, radiating)    Pt is 50 y/o female w/history of CVA w/dysarthria, polysubstance abuse, depression, anxiety, COPD, mitral valve stenosis, venous stasis, and hypertension who presents with left hip pain x 1 week. Pt states she was walking up stairs and felt like her hip "gave out" on her. She denies any trauma or fall. Pt states she has some left sided low back pain, but pain is mostly in hip and radiates into groin. She denies radiating LLE pain, numbness/tingling, weakness, b/b problems, abdominal pain. Pt has been taking Aleve with no relief. She is established with PCP and has appointment in April.     Leg Pain    The incident occurred more than 1 week ago. There was no injury mechanism. The pain is present in the left leg, left hip, left thigh, left knee, left ankle and left heel. The pain is at a severity of 8/10. The pain is mild. The pain has been constant since onset. Pertinent negatives include no inability to bear weight. She reports no foreign bodies present. The symptoms are aggravated by movement, weight bearing and palpation. She has tried acetaminophen (2 bottles of advil, 1 bottle of aleve the past week) for the symptoms. The treatment provided no relief.       Constitution: Negative for chills, fatigue and fever.   HENT: Negative for congestion and sore throat.    Neck: Negative for painful lymph nodes.   Cardiovascular: Negative for chest pain and leg swelling.   Eyes: Negative for double vision and blurred vision.   Respiratory: Negative for cough and shortness of breath.    Gastrointestinal: Negative for nausea, vomiting and diarrhea.   Genitourinary: Negative for dysuria, frequency, urgency and history of kidney " stones.   Musculoskeletal: Positive for pain and joint pain. Negative for joint swelling, muscle cramps and muscle ache.   Skin: Negative for color change, pale, rash, erythema and bruising.   Allergic/Immunologic: Negative for seasonal allergies.   Neurological: Negative for dizziness, history of vertigo, light-headedness, passing out and headaches.   Hematologic/Lymphatic: Negative for swollen lymph nodes.   Psychiatric/Behavioral: Negative for nervous/anxious, sleep disturbance and depression. The patient is not nervous/anxious.        Objective:      Physical Exam   Constitutional: She is oriented to person, place, and time. She appears well-developed and well-nourished.   HENT:   Head: Normocephalic and atraumatic. Head is without abrasion, without contusion and without laceration.   Right Ear: External ear normal.   Left Ear: External ear normal.   Nose: Nose normal.   Mouth/Throat: Oropharynx is clear and moist and mucous membranes are normal.   Eyes: Pupils are equal, round, and reactive to light. Conjunctivae, EOM and lids are normal.   Neck: Trachea normal, full passive range of motion without pain and phonation normal. Neck supple.   Cardiovascular: Normal rate, regular rhythm and normal heart sounds.   Pulmonary/Chest: Effort normal and breath sounds normal. No stridor. No respiratory distress.   Musculoskeletal: Normal range of motion.        Left hip: She exhibits decreased strength (3/5 hip flexion due to pain, other wise full strength throughout LLE) and tenderness. She exhibits no bony tenderness and no swelling.        Lumbar back: She exhibits tenderness. She exhibits normal range of motion, no bony tenderness and no spasm.        Back:         Legs:  Neurological: She is alert and oriented to person, place, and time.   Skin: Skin is warm, dry, intact and no rash. Capillary refill takes less than 2 seconds. abrasion, burn, bruising, erythema and ecchymosis  Psychiatric: She has a normal mood and  affect. Her speech is normal and behavior is normal. Judgment and thought content normal. Cognition and memory are normal.   Nursing note and vitals reviewed.    X-ray Lumbar Spine Ap And Lateral    Result Date: 3/6/2020  EXAMINATION: XR LUMBAR SPINE AP AND LATERAL CLINICAL HISTORY: Low back pain, <6wks, no red flags, no prior management;  Low back pain TECHNIQUE: AP and lateral views as well as coned-down lateral images are performed through the lumbar spine. COMPARISON: None FINDINGS: AP, lateral and coned down lateral radiographs of the lumbar spine reveal 5 non-rib bearing lumbar vertebral bodies with normal vertebral body heights , pedicles and disk spaces.  There is no malalignment, spondylolysis or spondylolisthesis.  There is no significant facet arthropathy.  The surrounding soft tissues are normal.     Normal exam Electronically signed by: Valentine Jordan MD Date:    03/06/2020 Time:    17:58    Xr Hip 2 View Left    Result Date: 3/6/2020  EXAMINATION: XR HIP 2 VIEW LEFT CLINICAL HISTORY: Pain in left hip TECHNIQUE: AP view of the pelvis and frog leg lateral view of the left hip were performed. COMPARISON: 04/04/2018 FINDINGS: The osseous structures, soft tissues and joint spaces are normal.     Normal exam Electronically signed by: Valentine Jordan MD Date:    03/06/2020 Time:    17:59        Assessment:       1. Left hip pain    2. Acute left-sided low back pain without sciatica        Plan:         Recommend f/u with PCP if pain continues. No evidence of fracture or dislocation. Recommend NSAIDs and tylenol prn for pain. Ice or heating pad to the area.     Left hip pain  -     XR HIP 2 VIEW LEFT; Future; Expected date: 03/06/2020  -     ketorolac injection 30 mg  -     methylPREDNISolone (MEDROL DOSEPACK) 4 mg tablet; use as directed  Dispense: 1 Package; Refill: 0    Acute left-sided low back pain without sciatica  -     X-Ray Lumbar Spine AP And Lateral; Future; Expected date: 03/06/2020      Patient  Instructions     Hip Strain    You have a strain of the muscles around the hip joint. A muscle strain is a stretching or tearing of muscle fibers. This causes pain, especially when you move that muscle. There may also be some swelling and bruising.  Home care  · Stay off the injured leg as much as possible until you can walk on it without pain. If you have a lot of pain with walking, crutches or a walker may be prescribed. These can be rented or purchased at many pharmacies and surgical or orthopedic supply stores. Follow your healthcare provider's advice regarding when to begin putting weight on that leg.  · Apply an ice pack over the injured area for 15 to 20 minutes every 3 to 6 hours. You should do this for the first 24 to 48 hours. You can make an ice pack by filling a plastic bag that seals at the top with ice cubes and then wrapping it with a thin towel. Be careful not to injure your skin with the ice treatments. Ice should never be applied directly to skin. Continue the use of ice packs for relief of pain and swelling as needed. After 48 hours, apply heat (warm shower or warm bath) for 15 to 20 minutes several times a day, or alternate ice and heat.  · You may use over-the-counter pain medicine to control pain, unless another pain medicine was prescribed. If you have chronic liver or kidney disease or ever had a stomach ulcer or GI bleeding, talk with your healthcare provider beforeusing these medicines.  · If you play sports, you may resume these activities when you are able to hop and run on the injured leg without pain.  Follow-up care  Follow up with your healthcare provider, or as advised. If your symptoms do not begin to get better after a week, more tests may be needed.  If X-rays were taken, you will be told of any new findings that may affect your care.  When to seek medical advice  Call your healthcare provider right away if any of these occur:  · Increased swelling or bruising  · Increased  pain  · Losing the ability to put weight on the injured side  Date Last Reviewed: 11/19/2015  © 0856-6012 [employer]. [address] [place] [address], [place] [address]. All rights reserved. This information is not intended as a substitute for professional medical care. Always follow your healthcare professional's instructions.      Please follow up with your Primary care provider within 2-5 days if your signs and symptoms have not resolved or worsen.     If your condition worsens or fails to improve we recommend that you receive another evaluation at the emergency room immediately or contact your primary medical clinic to discuss your concerns.   You must understand that you have received an Urgent Care treatment only and that you may be released before all of your medical problems are known or treated. You, the patient, will arrange for follow up care as instructed.     RED FLAGS/WARNING SYMPTOMS DISCUSSED WITH PATIENT THAT WOULD WARRANT EMERGENT MEDICAL ATTENTION. PATIENT VERBALIZED UNDERSTANDING.

## 2020-03-07 NOTE — PATIENT INSTRUCTIONS
Hip Strain    You have a strain of the muscles around the hip joint. A muscle strain is a stretching or tearing of muscle fibers. This causes pain, especially when you move that muscle. There may also be some swelling and bruising.  Home care  · Stay off the injured leg as much as possible until you can walk on it without pain. If you have a lot of pain with walking, crutches or a walker may be prescribed. These can be rented or purchased at many pharmacies and surgical or orthopedic supply stores. Follow your healthcare provider's advice regarding when to begin putting weight on that leg.  · Apply an ice pack over the injured area for 15 to 20 minutes every 3 to 6 hours. You should do this for the first 24 to 48 hours. You can make an ice pack by filling a plastic bag that seals at the top with ice cubes and then wrapping it with a thin towel. Be careful not to injure your skin with the ice treatments. Ice should never be applied directly to skin. Continue the use of ice packs for relief of pain and swelling as needed. After 48 hours, apply heat (warm shower or warm bath) for 15 to 20 minutes several times a day, or alternate ice and heat.  · You may use over-the-counter pain medicine to control pain, unless another pain medicine was prescribed. If you have chronic liver or kidney disease or ever had a stomach ulcer or GI bleeding, talk with your healthcare provider beforeusing these medicines.  · If you play sports, you may resume these activities when you are able to hop and run on the injured leg without pain.  Follow-up care  Follow up with your healthcare provider, or as advised. If your symptoms do not begin to get better after a week, more tests may be needed.  If X-rays were taken, you will be told of any new findings that may affect your care.  When to seek medical advice  Call your healthcare provider right away if any of these occur:  · Increased swelling or bruising  · Increased pain  · Losing the  ability to put weight on the injured side  Date Last Reviewed: 11/19/2015  © 4569-2835 [employer]. [address] [place] [address], [place] [address]. All rights reserved. This information is not intended as a substitute for professional medical care. Always follow your healthcare professional's instructions.      Please follow up with your Primary care provider within 2-5 days if your signs and symptoms have not resolved or worsen.     If your condition worsens or fails to improve we recommend that you receive another evaluation at the emergency room immediately or contact your primary medical clinic to discuss your concerns.   You must understand that you have received an Urgent Care treatment only and that you may be released before all of your medical problems are known or treated. You, the patient, will arrange for follow up care as instructed.     RED FLAGS/WARNING SYMPTOMS DISCUSSED WITH PATIENT THAT WOULD WARRANT EMERGENT MEDICAL ATTENTION. PATIENT VERBALIZED UNDERSTANDING.

## 2020-03-08 ENCOUNTER — TELEPHONE (OUTPATIENT)
Dept: URGENT CARE | Facility: CLINIC | Age: 50
End: 2020-03-08

## 2021-04-28 ENCOUNTER — PATIENT MESSAGE (OUTPATIENT)
Dept: RESEARCH | Facility: HOSPITAL | Age: 51
End: 2021-04-28

## 2021-06-12 NOTE — TELEPHONE ENCOUNTER
----- Message from Bella Alanis sent at 2/9/2018  4:35 PM CST -----  Contact: Patient  Patient returned call. She can be contacted at [phone].    Thanks,  Bella  
----- Message from Rosa Maria Cedeno NP sent at 2/9/2018  9:24 AM CST -----  Please call patient and inform  her that ultrasound indicates a possible left renal mass.Patient needs a referral appt to urology. I did attempt to discuss this with the patient, no answer.    
Spoke with pt  Notified I have been unable to schedule Urology, appt desk will send message directly to Faye's nurse to schedule pt and will call pt directly.   
Spoke with pt  Pt states she has not received a call from urology clinic, I have called clinic several times, but unsuccessful informed pt that I will follow up on next week to check status of appt. Patient verbalized understanding     
Statement Selected

## 2021-07-01 ENCOUNTER — PATIENT MESSAGE (OUTPATIENT)
Dept: ADMINISTRATIVE | Facility: OTHER | Age: 51
End: 2021-07-01

## 2025-05-14 NOTE — TELEPHONE ENCOUNTER
- continue metoprolol  - losartan held post op   ----- Message from Diana Lane sent at 3/9/2018  3:40 PM CST -----  Contact: pt  States she's returning a call. Please call pt at [phone]. Thank you